# Patient Record
Sex: MALE | Race: WHITE | Employment: OTHER | URBAN - METROPOLITAN AREA
[De-identification: names, ages, dates, MRNs, and addresses within clinical notes are randomized per-mention and may not be internally consistent; named-entity substitution may affect disease eponyms.]

---

## 2018-04-13 ENCOUNTER — TELEPHONE (OUTPATIENT)
Dept: ORTHOPEDIC SURGERY | Age: 83
End: 2018-04-13

## 2018-04-23 ENCOUNTER — OFFICE VISIT (OUTPATIENT)
Dept: ORTHOPEDIC SURGERY | Age: 83
End: 2018-04-23

## 2018-04-23 VITALS — BODY MASS INDEX: 31.83 KG/M2 | WEIGHT: 210 LBS | HEIGHT: 68 IN

## 2018-04-23 DIAGNOSIS — M25.532 LEFT WRIST PAIN: Primary | ICD-10-CM

## 2018-04-23 DIAGNOSIS — M65.342 TRIGGER RING FINGER OF LEFT HAND: ICD-10-CM

## 2018-04-23 PROCEDURE — 99203 OFFICE O/P NEW LOW 30 MIN: CPT | Performed by: ORTHOPAEDIC SURGERY

## 2018-04-23 PROCEDURE — G8427 DOCREV CUR MEDS BY ELIG CLIN: HCPCS | Performed by: ORTHOPAEDIC SURGERY

## 2018-04-23 PROCEDURE — 1036F TOBACCO NON-USER: CPT | Performed by: ORTHOPAEDIC SURGERY

## 2018-04-23 PROCEDURE — G8417 CALC BMI ABV UP PARAM F/U: HCPCS | Performed by: ORTHOPAEDIC SURGERY

## 2018-04-23 PROCEDURE — 20550 NJX 1 TENDON SHEATH/LIGAMENT: CPT | Performed by: ORTHOPAEDIC SURGERY

## 2018-04-23 PROCEDURE — 1123F ACP DISCUSS/DSCN MKR DOCD: CPT | Performed by: ORTHOPAEDIC SURGERY

## 2018-04-23 PROCEDURE — 4040F PNEUMOC VAC/ADMIN/RCVD: CPT | Performed by: ORTHOPAEDIC SURGERY

## 2018-04-23 RX ORDER — AMLODIPINE BESYLATE 5 MG/1
TABLET ORAL
Refills: 3 | Status: ON HOLD | COMMUNITY
Start: 2018-03-03 | End: 2022-04-27 | Stop reason: HOSPADM

## 2018-04-23 RX ORDER — ATORVASTATIN CALCIUM 20 MG/1
TABLET, FILM COATED ORAL
Refills: 0 | Status: ON HOLD | COMMUNITY
Start: 2018-03-03 | End: 2022-04-26 | Stop reason: HOSPADM

## 2020-07-14 ENCOUNTER — OFFICE VISIT (OUTPATIENT)
Dept: PRIMARY CARE CLINIC | Age: 85
End: 2020-07-14
Payer: MEDICARE

## 2020-07-14 PROCEDURE — G8421 BMI NOT CALCULATED: HCPCS | Performed by: NURSE PRACTITIONER

## 2020-07-14 PROCEDURE — G8428 CUR MEDS NOT DOCUMENT: HCPCS | Performed by: NURSE PRACTITIONER

## 2020-07-14 PROCEDURE — 99211 OFF/OP EST MAY X REQ PHY/QHP: CPT | Performed by: NURSE PRACTITIONER

## 2020-07-14 NOTE — PROGRESS NOTES
Lugenia Flatness received a viral test for COVID-19. They were educated on isolation and quarantine as appropriate. For any symptoms, they were directed to seek care from their PCP, given contact information to establish with a doctor, directed to an urgent care or the emergency room.

## 2020-07-15 ENCOUNTER — TELEPHONE (OUTPATIENT)
Dept: ADMINISTRATIVE | Age: 85
End: 2020-07-15

## 2020-07-19 LAB
SARS-COV-2: NOT DETECTED
SOURCE: NORMAL

## 2022-04-20 ENCOUNTER — APPOINTMENT (OUTPATIENT)
Dept: CT IMAGING | Age: 87
DRG: 177 | End: 2022-04-20
Payer: MEDICARE

## 2022-04-20 ENCOUNTER — APPOINTMENT (OUTPATIENT)
Dept: GENERAL RADIOLOGY | Age: 87
DRG: 177 | End: 2022-04-20
Payer: MEDICARE

## 2022-04-20 ENCOUNTER — HOSPITAL ENCOUNTER (INPATIENT)
Age: 87
LOS: 6 days | Discharge: NURSING, REHAB OR LONG-TERM CARE | DRG: 177 | End: 2022-04-27
Attending: INTERNAL MEDICINE | Admitting: INTERNAL MEDICINE
Payer: MEDICARE

## 2022-04-20 DIAGNOSIS — R09.02 HYPOXIA: Primary | ICD-10-CM

## 2022-04-20 DIAGNOSIS — J18.9 MULTIFOCAL PNEUMONIA: ICD-10-CM

## 2022-04-20 DIAGNOSIS — J18.9 PNEUMONIA DUE TO INFECTIOUS ORGANISM, UNSPECIFIED LATERALITY, UNSPECIFIED PART OF LUNG: ICD-10-CM

## 2022-04-20 LAB
A/G RATIO: 1.4 (ref 1.1–2.2)
ALBUMIN SERPL-MCNC: 4 G/DL (ref 3.4–5)
ALP BLD-CCNC: 71 U/L (ref 40–129)
ALT SERPL-CCNC: 15 U/L (ref 10–40)
ANION GAP SERPL CALCULATED.3IONS-SCNC: 12 MMOL/L (ref 3–16)
AST SERPL-CCNC: 24 U/L (ref 15–37)
BASOPHILS ABSOLUTE: 0 K/UL (ref 0–0.2)
BASOPHILS RELATIVE PERCENT: 0.3 %
BILIRUB SERPL-MCNC: 0.5 MG/DL (ref 0–1)
BUN BLDV-MCNC: 16 MG/DL (ref 7–20)
CALCIUM SERPL-MCNC: 9.4 MG/DL (ref 8.3–10.6)
CHLORIDE BLD-SCNC: 101 MMOL/L (ref 99–110)
CO2: 28 MMOL/L (ref 21–32)
CREAT SERPL-MCNC: 1.1 MG/DL (ref 0.8–1.3)
EOSINOPHILS ABSOLUTE: 0.1 K/UL (ref 0–0.6)
EOSINOPHILS RELATIVE PERCENT: 0.5 %
GFR AFRICAN AMERICAN: >60
GFR NON-AFRICAN AMERICAN: >60
GLUCOSE BLD-MCNC: 177 MG/DL (ref 70–99)
HCT VFR BLD CALC: 42 % (ref 40.5–52.5)
HEMOGLOBIN: 13.2 G/DL (ref 13.5–17.5)
LYMPHOCYTES ABSOLUTE: 1.3 K/UL (ref 1–5.1)
LYMPHOCYTES RELATIVE PERCENT: 11.4 %
MCH RBC QN AUTO: 28.5 PG (ref 26–34)
MCHC RBC AUTO-ENTMCNC: 31.5 G/DL (ref 31–36)
MCV RBC AUTO: 90.5 FL (ref 80–100)
MONOCYTES ABSOLUTE: 0.4 K/UL (ref 0–1.3)
MONOCYTES RELATIVE PERCENT: 4 %
NEUTROPHILS ABSOLUTE: 9.4 K/UL (ref 1.7–7.7)
NEUTROPHILS RELATIVE PERCENT: 83.8 %
PDW BLD-RTO: 17.3 % (ref 12.4–15.4)
PLATELET # BLD: 169 K/UL (ref 135–450)
PMV BLD AUTO: 8.1 FL (ref 5–10.5)
POTASSIUM REFLEX MAGNESIUM: 4.3 MMOL/L (ref 3.5–5.1)
PRO-BNP: 413 PG/ML (ref 0–449)
PROCALCITONIN: 0.31 NG/ML (ref 0–0.15)
RBC # BLD: 4.64 M/UL (ref 4.2–5.9)
SODIUM BLD-SCNC: 141 MMOL/L (ref 136–145)
TOTAL PROTEIN: 6.8 G/DL (ref 6.4–8.2)
TROPONIN: <0.01 NG/ML
WBC # BLD: 11.2 K/UL (ref 4–11)

## 2022-04-20 PROCEDURE — 6360000004 HC RX CONTRAST MEDICATION: Performed by: PHYSICIAN ASSISTANT

## 2022-04-20 PROCEDURE — 36415 COLL VENOUS BLD VENIPUNCTURE: CPT

## 2022-04-20 PROCEDURE — 84484 ASSAY OF TROPONIN QUANT: CPT

## 2022-04-20 PROCEDURE — 93005 ELECTROCARDIOGRAM TRACING: CPT | Performed by: EMERGENCY MEDICINE

## 2022-04-20 PROCEDURE — 83880 ASSAY OF NATRIURETIC PEPTIDE: CPT

## 2022-04-20 PROCEDURE — 80053 COMPREHEN METABOLIC PANEL: CPT

## 2022-04-20 PROCEDURE — 71260 CT THORAX DX C+: CPT

## 2022-04-20 PROCEDURE — 96365 THER/PROPH/DIAG IV INF INIT: CPT

## 2022-04-20 PROCEDURE — 71045 X-RAY EXAM CHEST 1 VIEW: CPT

## 2022-04-20 PROCEDURE — 84145 PROCALCITONIN (PCT): CPT

## 2022-04-20 PROCEDURE — 96375 TX/PRO/DX INJ NEW DRUG ADDON: CPT

## 2022-04-20 PROCEDURE — 99285 EMERGENCY DEPT VISIT HI MDM: CPT

## 2022-04-20 PROCEDURE — 93005 ELECTROCARDIOGRAM TRACING: CPT | Performed by: INTERNAL MEDICINE

## 2022-04-20 PROCEDURE — 85025 COMPLETE CBC W/AUTO DIFF WBC: CPT

## 2022-04-20 PROCEDURE — 83036 HEMOGLOBIN GLYCOSYLATED A1C: CPT

## 2022-04-20 RX ORDER — LEVOFLOXACIN 5 MG/ML
750 INJECTION, SOLUTION INTRAVENOUS ONCE
Status: COMPLETED | OUTPATIENT
Start: 2022-04-20 | End: 2022-04-21

## 2022-04-20 RX ORDER — PANTOPRAZOLE SODIUM 20 MG/1
20 TABLET, DELAYED RELEASE ORAL 2 TIMES DAILY
COMMUNITY

## 2022-04-20 RX ORDER — GABAPENTIN 100 MG/1
300 CAPSULE ORAL 4 TIMES DAILY
COMMUNITY

## 2022-04-20 RX ORDER — MULTIVIT WITH MINERALS/LUTEIN
250 TABLET ORAL DAILY
Status: ON HOLD | COMMUNITY
End: 2022-04-26 | Stop reason: HOSPADM

## 2022-04-20 RX ORDER — THIAMINE MONONITRATE (VIT B1) 100 MG
100 TABLET ORAL DAILY
COMMUNITY

## 2022-04-20 RX ADMIN — IOPAMIDOL 75 ML: 755 INJECTION, SOLUTION INTRAVENOUS at 22:06

## 2022-04-21 PROBLEM — E11.9 TYPE 2 DIABETES MELLITUS (HCC): Status: ACTIVE | Noted: 2022-04-21

## 2022-04-21 PROBLEM — J18.9 MULTIFOCAL PNEUMONIA: Status: ACTIVE | Noted: 2022-04-21

## 2022-04-21 PROBLEM — J96.01 ACUTE RESPIRATORY FAILURE WITH HYPOXIA (HCC): Status: ACTIVE | Noted: 2022-04-21

## 2022-04-21 LAB
ESTIMATED AVERAGE GLUCOSE: 139.9 MG/DL
ESTIMATED AVERAGE GLUCOSE: 145.6 MG/DL
GLUCOSE BLD-MCNC: 105 MG/DL (ref 70–99)
GLUCOSE BLD-MCNC: 113 MG/DL (ref 70–99)
GLUCOSE BLD-MCNC: 147 MG/DL (ref 70–99)
GLUCOSE BLD-MCNC: 155 MG/DL (ref 70–99)
GLUCOSE BLD-MCNC: 222 MG/DL (ref 70–99)
HBA1C MFR BLD: 6.5 %
HBA1C MFR BLD: 6.7 %
INFLUENZA A: NOT DETECTED
INFLUENZA B: NOT DETECTED
LACTIC ACID, SEPSIS: 0.8 MMOL/L (ref 0.4–1.9)
PERFORMED ON: ABNORMAL
SARS-COV-2 RNA, RT PCR: NOT DETECTED

## 2022-04-21 PROCEDURE — 92610 EVALUATE SWALLOWING FUNCTION: CPT

## 2022-04-21 PROCEDURE — 36415 COLL VENOUS BLD VENIPUNCTURE: CPT

## 2022-04-21 PROCEDURE — 6370000000 HC RX 637 (ALT 250 FOR IP): Performed by: INTERNAL MEDICINE

## 2022-04-21 PROCEDURE — 92526 ORAL FUNCTION THERAPY: CPT

## 2022-04-21 PROCEDURE — 83036 HEMOGLOBIN GLYCOSYLATED A1C: CPT

## 2022-04-21 PROCEDURE — 6360000002 HC RX W HCPCS: Performed by: INTERNAL MEDICINE

## 2022-04-21 PROCEDURE — 87636 SARSCOV2 & INF A&B AMP PRB: CPT

## 2022-04-21 PROCEDURE — 1200000000 HC SEMI PRIVATE

## 2022-04-21 PROCEDURE — 2580000003 HC RX 258: Performed by: INTERNAL MEDICINE

## 2022-04-21 PROCEDURE — 6360000002 HC RX W HCPCS: Performed by: PHYSICIAN ASSISTANT

## 2022-04-21 PROCEDURE — 87040 BLOOD CULTURE FOR BACTERIA: CPT

## 2022-04-21 PROCEDURE — 94640 AIRWAY INHALATION TREATMENT: CPT

## 2022-04-21 PROCEDURE — 83605 ASSAY OF LACTIC ACID: CPT

## 2022-04-21 PROCEDURE — 94150 VITAL CAPACITY TEST: CPT

## 2022-04-21 PROCEDURE — 2700000000 HC OXYGEN THERAPY PER DAY

## 2022-04-21 RX ORDER — ANASTROZOLE 1 MG/1
1 TABLET ORAL DAILY
Status: DISCONTINUED | OUTPATIENT
Start: 2022-04-21 | End: 2022-04-21 | Stop reason: ALTCHOICE

## 2022-04-21 RX ORDER — INSULIN LISPRO 100 [IU]/ML
0-12 INJECTION, SOLUTION INTRAVENOUS; SUBCUTANEOUS
Status: DISCONTINUED | OUTPATIENT
Start: 2022-04-21 | End: 2022-04-27 | Stop reason: HOSPADM

## 2022-04-21 RX ORDER — SODIUM CHLORIDE 9 MG/ML
INJECTION, SOLUTION INTRAVENOUS PRN
Status: DISCONTINUED | OUTPATIENT
Start: 2022-04-21 | End: 2022-04-27 | Stop reason: HOSPADM

## 2022-04-21 RX ORDER — ACETAMINOPHEN 325 MG/1
650 TABLET ORAL EVERY 6 HOURS PRN
Status: DISCONTINUED | OUTPATIENT
Start: 2022-04-21 | End: 2022-04-27 | Stop reason: HOSPADM

## 2022-04-21 RX ORDER — DEXTROSE MONOHYDRATE 50 MG/ML
100 INJECTION, SOLUTION INTRAVENOUS PRN
Status: DISCONTINUED | OUTPATIENT
Start: 2022-04-21 | End: 2022-04-27 | Stop reason: HOSPADM

## 2022-04-21 RX ORDER — NICOTINE POLACRILEX 4 MG
15 LOZENGE BUCCAL PRN
Status: DISCONTINUED | OUTPATIENT
Start: 2022-04-21 | End: 2022-04-27 | Stop reason: HOSPADM

## 2022-04-21 RX ORDER — DEXTROSE MONOHYDRATE 25 G/50ML
12.5 INJECTION, SOLUTION INTRAVENOUS PRN
Status: DISCONTINUED | OUTPATIENT
Start: 2022-04-21 | End: 2022-04-27 | Stop reason: HOSPADM

## 2022-04-21 RX ORDER — AZITHROMYCIN 250 MG/1
500 TABLET, FILM COATED ORAL EVERY 24 HOURS
Status: COMPLETED | OUTPATIENT
Start: 2022-04-21 | End: 2022-04-23

## 2022-04-21 RX ORDER — ONDANSETRON 4 MG/1
4 TABLET, ORALLY DISINTEGRATING ORAL EVERY 8 HOURS PRN
Status: DISCONTINUED | OUTPATIENT
Start: 2022-04-21 | End: 2022-04-27 | Stop reason: HOSPADM

## 2022-04-21 RX ORDER — ALBUTEROL SULFATE 2.5 MG/3ML
2.5 SOLUTION RESPIRATORY (INHALATION) EVERY 4 HOURS PRN
Status: DISCONTINUED | OUTPATIENT
Start: 2022-04-21 | End: 2022-04-27 | Stop reason: HOSPADM

## 2022-04-21 RX ORDER — AMLODIPINE BESYLATE AND ATORVASTATIN CALCIUM 5; 20 MG/1; MG/1
1 TABLET, FILM COATED ORAL DAILY
Status: DISCONTINUED | OUTPATIENT
Start: 2022-04-21 | End: 2022-04-21

## 2022-04-21 RX ORDER — INSULIN LISPRO 100 [IU]/ML
0-6 INJECTION, SOLUTION INTRAVENOUS; SUBCUTANEOUS NIGHTLY
Status: DISCONTINUED | OUTPATIENT
Start: 2022-04-21 | End: 2022-04-27 | Stop reason: HOSPADM

## 2022-04-21 RX ORDER — SODIUM CHLORIDE 0.9 % (FLUSH) 0.9 %
5-40 SYRINGE (ML) INJECTION PRN
Status: DISCONTINUED | OUTPATIENT
Start: 2022-04-21 | End: 2022-04-27 | Stop reason: HOSPADM

## 2022-04-21 RX ORDER — POLYETHYLENE GLYCOL 3350 17 G/17G
17 POWDER, FOR SOLUTION ORAL DAILY PRN
Status: DISCONTINUED | OUTPATIENT
Start: 2022-04-21 | End: 2022-04-27 | Stop reason: HOSPADM

## 2022-04-21 RX ORDER — SODIUM CHLORIDE 0.9 % (FLUSH) 0.9 %
5-40 SYRINGE (ML) INJECTION EVERY 12 HOURS SCHEDULED
Status: DISCONTINUED | OUTPATIENT
Start: 2022-04-21 | End: 2022-04-27 | Stop reason: HOSPADM

## 2022-04-21 RX ORDER — ACETAMINOPHEN 650 MG/1
650 SUPPOSITORY RECTAL EVERY 6 HOURS PRN
Status: DISCONTINUED | OUTPATIENT
Start: 2022-04-21 | End: 2022-04-27 | Stop reason: HOSPADM

## 2022-04-21 RX ORDER — GABAPENTIN 300 MG/1
600 CAPSULE ORAL 4 TIMES DAILY
Status: DISCONTINUED | OUTPATIENT
Start: 2022-04-21 | End: 2022-04-23

## 2022-04-21 RX ORDER — AMLODIPINE BESYLATE 5 MG/1
5 TABLET ORAL DAILY
Status: DISCONTINUED | OUTPATIENT
Start: 2022-04-21 | End: 2022-04-27

## 2022-04-21 RX ORDER — PREGABALIN 100 MG/1
100 CAPSULE ORAL DAILY
Status: DISCONTINUED | OUTPATIENT
Start: 2022-04-21 | End: 2022-04-21 | Stop reason: ALTCHOICE

## 2022-04-21 RX ORDER — ENOXAPARIN SODIUM 100 MG/ML
40 INJECTION SUBCUTANEOUS DAILY
Status: DISCONTINUED | OUTPATIENT
Start: 2022-04-21 | End: 2022-04-27 | Stop reason: HOSPADM

## 2022-04-21 RX ORDER — ALBUTEROL SULFATE 2.5 MG/3ML
2.5 SOLUTION RESPIRATORY (INHALATION) 2 TIMES DAILY
Status: DISCONTINUED | OUTPATIENT
Start: 2022-04-21 | End: 2022-04-24

## 2022-04-21 RX ORDER — ONDANSETRON 2 MG/ML
4 INJECTION INTRAMUSCULAR; INTRAVENOUS EVERY 6 HOURS PRN
Status: DISCONTINUED | OUTPATIENT
Start: 2022-04-21 | End: 2022-04-27 | Stop reason: HOSPADM

## 2022-04-21 RX ORDER — PANTOPRAZOLE SODIUM 40 MG/1
40 TABLET, DELAYED RELEASE ORAL
Status: DISCONTINUED | OUTPATIENT
Start: 2022-04-21 | End: 2022-04-27 | Stop reason: HOSPADM

## 2022-04-21 RX ORDER — DOXAZOSIN MESYLATE 4 MG/1
4 TABLET ORAL DAILY
Status: DISCONTINUED | OUTPATIENT
Start: 2022-04-21 | End: 2022-04-27 | Stop reason: HOSPADM

## 2022-04-21 RX ADMIN — ENOXAPARIN SODIUM 40 MG: 100 INJECTION SUBCUTANEOUS at 08:24

## 2022-04-21 RX ADMIN — Medication 10 ML: at 23:38

## 2022-04-21 RX ADMIN — GABAPENTIN 600 MG: 300 CAPSULE ORAL at 13:02

## 2022-04-21 RX ADMIN — INSULIN LISPRO 4 UNITS: 100 INJECTION, SOLUTION INTRAVENOUS; SUBCUTANEOUS at 18:09

## 2022-04-21 RX ADMIN — INSULIN LISPRO 2 UNITS: 100 INJECTION, SOLUTION INTRAVENOUS; SUBCUTANEOUS at 09:00

## 2022-04-21 RX ADMIN — GABAPENTIN 600 MG: 300 CAPSULE ORAL at 23:37

## 2022-04-21 RX ADMIN — LEVOFLOXACIN 750 MG: 5 INJECTION, SOLUTION INTRAVENOUS at 00:14

## 2022-04-21 RX ADMIN — PANTOPRAZOLE SODIUM 40 MG: 40 TABLET, DELAYED RELEASE ORAL at 17:37

## 2022-04-21 RX ADMIN — Medication 1000 MG: at 00:13

## 2022-04-21 RX ADMIN — INSULIN LISPRO 1 UNITS: 100 INJECTION, SOLUTION INTRAVENOUS; SUBCUTANEOUS at 23:40

## 2022-04-21 RX ADMIN — Medication 10 ML: at 08:25

## 2022-04-21 RX ADMIN — AMLODIPINE BESYLATE 5 MG: 5 TABLET ORAL at 08:24

## 2022-04-21 RX ADMIN — ALBUTEROL SULFATE 2.5 MG: 2.5 SOLUTION RESPIRATORY (INHALATION) at 20:00

## 2022-04-21 RX ADMIN — Medication 1000 MG: at 23:38

## 2022-04-21 RX ADMIN — GABAPENTIN 600 MG: 300 CAPSULE ORAL at 08:24

## 2022-04-21 RX ADMIN — ONDANSETRON 4 MG: 2 INJECTION INTRAMUSCULAR; INTRAVENOUS at 13:11

## 2022-04-21 RX ADMIN — GABAPENTIN 600 MG: 300 CAPSULE ORAL at 17:37

## 2022-04-21 RX ADMIN — DOXAZOSIN 4 MG: 4 TABLET ORAL at 08:25

## 2022-04-21 RX ADMIN — PANTOPRAZOLE SODIUM 40 MG: 40 TABLET, DELAYED RELEASE ORAL at 06:27

## 2022-04-21 RX ADMIN — ALBUTEROL SULFATE 2.5 MG: 2.5 SOLUTION RESPIRATORY (INHALATION) at 09:05

## 2022-04-21 RX ADMIN — AZITHROMYCIN MONOHYDRATE 500 MG: 250 TABLET ORAL at 04:30

## 2022-04-21 ASSESSMENT — PAIN SCALES - GENERAL: PAINLEVEL_OUTOF10: 0

## 2022-04-21 NOTE — ED NOTES
Blood cultures drawn and sent to lab:    [facility] 1: RAC, ST.  BC 2: [address] Lluvia Eckert RN  04/21/22 7942

## 2022-04-21 NOTE — PROGRESS NOTES
Shift assessment and vitals obtained by Gerard Paredes. Scheduled medications given. Pt assisted up to chair, currently watching TV. No needs expressed. Call light in reach. Fall precautions in place.

## 2022-04-21 NOTE — FLOWSHEET NOTE
04/21/22 0154   Assessment   Charting Type Admission   Psychosocial   Psychosocial (WDL) WDL   Neurological   Neuro (WDL) WDL   Level of Consciousness Alert (0)   [place] Coma Scale   Eye Opening 4   Best Verbal Response 5   Best Motor Response 6   [place] Coma Scale Score 15   HEENT (Head, Ears, Eyes, Nose, & Throat)   HEENT (WDL) WDL   Respiratory   Respiratory (WDL) WDL   Respiratory Pattern Tachypneic   Respiratory Depth Normal   Respiratory Quality/Effort Unlabored;Dyspnea with exertion   Chest Assessment Chest expansion symmetrical   L Breath Sounds Expiratory Wheezes   R Breath Sounds Expiratory Wheezes   Breath Sounds   Right Upper Lobe Expiratory Wheezes   Right Middle Lobe Expiratory Wheezes   Right Lower Lobe Expiratory Wheezes   Left Upper Lobe Expiratory Wheezes   Left Lower Lobe Expiratory Wheezes   Cardiac   Cardiac (WDL) WDL   Cardiac Monitor   Cardiac/Telemetry Monitor On Portable telemetry pack applied   Alarm Audible Yes   Gastrointestinal   Abdominal (WDL) WDL   Abdomen Inspection Distended   Genitourinary   Genitourinary (WDL) WDL   Suprapubic Tenderness No   Dysuria (Pain/Burning w/Urination) No   Difficulty Urinating/Starting Stream No   Peripheral Vascular   Peripheral Vascular (WDL) WDL   Edema None   Dual Clinician Skin Assessment   Dual Skin Assessment (4 Eyes) WDL   Skin Integumentary    Skin Integumentary (WDL) X   Skin Color Pink   Skin Condition/Temp Warm   Skin Integrity Abrasion   Location left knee and shin    Nails WDL   Musculoskeletal   Musculoskeletal (WDL) X   RL Extremity Weakness   LL Extremity Weakness

## 2022-04-21 NOTE — ED NOTES
Son at bedside stated patient recently started taking antidepressant and PCP thinks this is the cause of his tremors. States stopped taking antidepressant at lunch time today.       Narinder Lopez RN  04/20/22 4890

## 2022-04-21 NOTE — CARE COORDINATION
Discharge Planning Assessment  Readmission Risk: 10    RN/SW discharge planner met with patient/ (and family member) to discuss reason for admission, current living situation, and potential needs at the time of discharge. Demographics/Insurance verified (address, phone, insurance):   Yes    Current type of dwelling:  Ranch style home    Patient from ECF/ confirmed with:   n/a    Living arrangements: With spouse    Level of function/Support:  Independent    PCP:   Dr. Loreta Wagner   Last Visit to PCP:   04/13/2022    DME (physical):  None    DME (medical):  Pt recently started on home O2 at . Pt unable to name provider,  CM able to call PCP to determine O2 provider and called provider to confirm dose of 2L/NC continuous. [address] [place], [address]  P: [phone]  F: [phone]      Medication compliance issues:  None    Financial issues that could impact healthcare:  None    Transportation at the time of discharge:  One of his adult children    Tentative discharge plan:   Home with updated home O2 orders    Discussed with patient and/or family that on the day of discharge home tentative time of discharge will be between 10 AM and noon. Electronically signed by Oneal Ackerman RN on 4/21/2022 at 10:58 AM     UPDATE    CM spoke to Greg Wheat at [address] ([phone]) to verify pt is active and receives MoW.      Electronically signed by Oneal Ackerman RN on 4/21/2022 at 2:33 PM

## 2022-04-21 NOTE — PROGRESS NOTES
Facility/Department: [address] ONCOLOGY  Initial Assessment  DYSPHAGIA BEDSIDE SWALLOW EVALUATION     Patient: Rosalva Watkins   : 1925   MRN: [id number]      Evaluation Date: 2022   Admitting Diagnosis: Hypoxia [R09.02]  Pneumonia due to infectious organism, unspecified laterality, unspecified part of lung [J18.9]  Multifocal pneumonia [J18.9]  Pain: Pt did not report pain                       H&P: The patient is a 80 y.o. male with history of type 2 diabetes with neuropathy, hypertension, GERD, hyperlipidemia, BPH who presented with generalized malaise weakness, chills, tremors, and noted to have hypoxia with saturations of 86% room air. Chest x-ray done was noted for multifocal infiltrates with CT pulmonary angiogram was done with negative for PE but noted for multifocal pneumonia. He denies any fevers, no diaphoresis. No chest pains. No shortness of breath. No cough or production. Procalcitonin at 0.31  EKG sinus rhythm    Imaging:  CT Chest:    1. Limited exam, though no pulmonary emboli seen through the lobar branches. 2. Patchy consolidative airspace disease seen in the lingula and bilateral   lower lobes, left greater than right felt to represent multifocal pneumonia. No reactive effusion. 3. Cardiomegaly, with coronary artery atherosclerotic disease and minimal   aortic valvular calcification. 4. Mildly enlarged precarinal lymph node felt likely reactive.  No bulky   lymphadenopathy in the chest.   5. Multilevel degenerative changes are seen in the spine, with diffuse   osteopenia.  No acute fracture. Modified Barium Swallow Study: 2019  Patient presents with mild-moderate pharyngeal phase dysphagia characterized by laryngeal penetration of thin liquids in single sips and silent tracheal aspiration of thin liquids when taking multiple consecutive swallows. A chin tuck was successful in eliminating aspiration, and resulted in inconsistent trace penetration.  Epiglottic inversion and laryngeal elevation were adequate. Minimal vallecular residuals were noted after the swallow. Recommend a regular diet and thin liquids WITH USE OF SINGLE SIPS AND A CHIN TUCK to reduce the risk of aspiration. Silent aspiration could account for patient's recent h/o pneumonia. Regular texture diet with thin liquids (with chin tuck) was recommended following MBS. History/Prior Level of Function:   Living Status: Private residence   Prior Dysphagia History: Pt's son reported a history of aspiration with pneumonia. Pt has been hospitalized ~2x in the past 5 years, however his son reported he has been on antibiotics multiple times for pneumonia. Pt's son stated he does cough during meals and had an episode during lunch this date. He consumes regular foods with thin liquids at home. Reason for referral: SLP evaluation orders received due to coughing with intake     Dysphagia Impressions/Diagnosis: Mild-Moderate Oropharyngeal Dysphagia   Pt was seen sitting upright in chair on 5L O2 via nasal cannula. Pt has O2 at home, however does not wear consistently. Oral motor exam was within functional limits with strong volitional cough noted. Various textures were provided to assess swallow function. Pt presents with mild-moderate oropharyngeal dysphagia characterized by prolonged mastication, suspected premature bolus loss to pharynx, delayed swallow initiation, reduced laryngeal elevation upon manual palpation, suspected reduced pharyngeal clearing, and intermittent delayed coughing post liquid trials. Thin liquids via cup revealed suspected premature bolus loss to pharynx with delayed swallow initiation, intermittent use of two swallows noted with occasional delayed coughing. Given cues to use chin tuck strategy, reduced coughing was noted, however difficult to fully assess at bedside.  Prolonged mastication with mildly reduced A-P propulsion noted with regular solids, instance of coughing noted during mastication. Improved tolerance assessed with soft solid trials. Overall, Pt is at high risk of aspiration based on history of dysphagia and current respiratory compromise. Recommend Modified Barium Swallow Study to further assess the pharyngeal phase of swallowing and to assess for aspiration. Recommended Diet and Intervention 4/21/2022:  Diet Solids Recommendation:  Dysphagia III Soft and bite sized  Liquid Consistency Recommendation: Thin liquids with use of chin tuck, no straws Recommended form of Meds: Meds in puree      Recommend completion of Modified Barium Swallow study to further assess pharyngeal phase of swallow     Compensatory Swallowing Strategies: Alternate solids/liquids , Upright as possible with all PO intake , Chin tuck , Small bites/sips , Eat/feed slowly, Remain upright 30-45 min     SHORT TERM DYSPHAGIA GOALS/PLAN OF CARE: Speech therapy for dysphagia tx 3-5 times per week during acute care stay.     Pt will functionally tolerate recommended diet with no overt clinical s/s of aspiration   Pt will demonstrate understanding of aspiration risk and precautions via education/demonstration with occasional prompting  Pt will advance to least restrictive diet as indicated   Pt will participate in Modified Barium swallow study to further assess pharyngeal phase of swallow, assist with diet recommendations and to further direct plan of care     Dysphagia Therapeutic Intervention:  Diet Tolerance Monitoring , Patient/Family Education , Therapeutic Trials with SLP , Instrumental assessment of swallow function (Modified Barium Swallow Study)     Discharge Recommendations: Recommend ongoing SLP for dysphagia therapy upon discharge from hospital     Patient Positioning: Upright in chair    Current Diet Level (prior to evaluation): Regular texture diet  Thin liquids      Respiratory Status:   []Room Air   [x]O2 via nasal cannula   []Other:    Dentition:  [x]Adequate  []Dentures   []Missing Many Teeth  []Edentulous  []Other:    Baseline Vocal Quality:  []Normal  []Dysphonic   []Aphonic   []Hoarse  [x]Wet  []Weak  [x]Other:harsh    Volitional Cough:  [x]Strong  []Weak  []Wet  []Absent  [x]Congested  []Other:    Volitional Swallow:   []Absent   [x]Delayed     []Adequate     []Required use of drink     Oral Mechanism Exam:  [x]WFL []Mild   [] Moderate  []Severe  []To be assessed  Impaired:   []Left side      []Right side    []Labial ROM/Coordination    []Labial Symmetry   []Lingual ROM/Coordination   []Lingual Symmetry  []Gag  []Other:     Oral Phase: []WFL [x]Mild   [x] Moderate  []Severe  []To be assessed   [x]Impaired/Prolonged Mastication:   []Spillage Left:   []Spillage Right:  []Pocketing Left:   []Pocketing Right:   [x]Decreased Anterior to Posterior Transit:   [x]Suspected Premature Bolus Loss:   []Lingual/Palatal Residue:   []Other:     Pharyngeal Phase: []WFL [x]Mild   [x] Moderate  []Severe  []To be assessed   [x]Delayed Swallow:   [x]Suspected Pharyngeal Pooling:   [x]Decreased Laryngeal Elevation:   []Absent Swallow:  []Wet Vocal Quality:   []Throat Clearing-Immediate:   []Throat Clearing-Delayed:   []Cough-Immediate:   [x]Cough-Delayed: intermittent with thin liquids   []Change in Vital Signs:  []Suspected Delayed Pharyngeal Clearing:  []Other:     Eating Assistance:  []Independent  [x]Setup or clean-up assistance   [] Supervision or touching assistance   [] Partial or moderate assistance   [] Substantial or maximal assistance  [] Dependent       EDUCATION:   Provided education regarding role of SLP, results of assessment, recommendations and general speech pathology plan of care. [x] Pt verbalized understanding and agreement   [] Pt requires ongoing learning   [] No evidence of comprehension     If patient discharges prior to next visit, this note will serve as discharge.      Timed Code Minutes:  0 minutes  Total Treatment Minutes: 30 minutes     Electronically signed by:   Carlos Mariee M.A., [facility]  Speech-Language Pathologist

## 2022-04-21 NOTE — RT PROTOCOL NOTE
RT Inhaler-Nebulizer Bronchodilator Protocol Note    There is a bronchodilator order in the chart from a provider indicating to follow the RT Bronchodilator Protocol and there is an Initiate RT Inhaler-Nebulizer Bronchodilator Protocol order as well (see protocol at bottom of note). CXR Findings:  XR CHEST PORTABLE    Result Date: 4/20/2022  Mild cardiomegaly and mild central pulmonary congestion with hazy left perihilar opacity extending inferiorly which could be due to atypical pulmonary edema and/or early pneumonia. The findings from the last RT Protocol Assessment were as follows:   History Pulmonary Disease: None or smoker <15 pack years  Respiratory Pattern: Regular pattern and RR 12-20 bpm  Breath Sounds: Intermittent or unilateral wheezes  Cough: Strong, spontaneous, non-productive  Indication for Bronchodilator Therapy:    Bronchodilator Assessment Score: 4    Aerosolized bronchodilator medication orders have been revised according to the RT Inhaler-Nebulizer Bronchodilator Protocol below. Respiratory Therapist to perform RT Therapy Protocol Assessment initially then follow the protocol. Repeat RT Therapy Protocol Assessment PRN for score 0-3 or on second treatment, BID, and PRN for scores above 3. No Indications - adjust the frequency to every 6 hours PRN wheezing or bronchospasm, if no treatments needed after 48 hours then discontinue using Per Protocol order mode. If indication present, adjust the RT bronchodilator orders based on the Bronchodilator Assessment Score as indicated below. Use Inhaler orders unless patient has one or more of the following: on home nebulizer, not able to hold breath for 10 seconds, is not alert and oriented, cannot activate and use MDI correctly, or respiratory rate 25 breaths per minute or more, then use the equivalent nebulizer order(s) with same Frequency and PRN reasons based on the score.   If a patient is on this medication at home then do not decrease Frequency below that used at home. 0-3 - enter or revise RT bronchodilator order(s) to equivalent RT Bronchodilator order with Frequency of every 4 hours PRN for wheezing or increased work of breathing using Per Protocol order mode. 4-6 - enter or revise RT Bronchodilator order(s) to two equivalent RT bronchodilator orders with one order with BID Frequency and one order with Frequency of every 4 hours PRN wheezing or increased work of breathing using Per Protocol order mode. 7-10 - enter or revise RT Bronchodilator order(s) to two equivalent RT bronchodilator orders with one order with TID Frequency and one order with Frequency of every 4 hours PRN wheezing or increased work of breathing using Per Protocol order mode. 11-13 - enter or revise RT Bronchodilator order(s) to one equivalent RT bronchodilator order with QID Frequency and an Albuterol order with Frequency of every 4 hours PRN wheezing or increased work of breathing using Per Protocol order mode. Greater than 13 - enter or revise RT Bronchodilator order(s) to one equivalent RT bronchodilator order with every 4 hours Frequency and an Albuterol order with Frequency of every 2 hours PRN wheezing or increased work of breathing using Per Protocol order mode. RT to enter RT Home Evaluation for COPD & MDI Assessment order using Per Protocol order mode.     Electronically signed by Quiana Guo RCP on 4/21/2022 at 3:52 AM

## 2022-04-21 NOTE — ED PROVIDER NOTES
[facility]        Pt Name: Rachele Martinez  MRN: [id number]  [place] 12/22/1925  Date of evaluation: 4/20/2022  Provider: Pily Palacios PA-C  PCP: Sanket Espino MD  Note Started: 8:55 PM EDT       EVELYN. I have evaluated this patient. My supervising physician was available for consultation. I personally saw the patient and independently provided 33 minutes of non-concurrent critical care time out of the total critical care time provided. This excludes time spent doing separately billable procedures. This includes time at the bedside, data interpretation, medication management, obtaining critical history from collateral sources if the patient is unable to provide it directly, and physician consultation. Specifics of interventions taken and potentially life-threatening diagnostic considerations are listed above in the medical decision making. CHIEF COMPLAINT       Chief Complaint   Patient presents with    Shortness of Breath     pt with tremors that started today in hands and feet after starting new med, pt also was 86% on RA. Placed on NRB and at 97%. [place] ems from home. HISTORY OF PRESENT ILLNESS   (Location, Timing/Onset, Context/Setting, Quality, Duration, Modifying Factors, Severity, Associated Signs and Symptoms)  Note limiting factors. Chief Complaint: Chills, hypoxia    Rachele Martinez is a 80 y.o. male who presents to the emergency department with a chief complaint of chills and \" trembling. \"  He states this began today. He states he wears oxygen just at night. He is unsure why he wears oxygen. Does not wear this every day. Denies any history of COPD, lung disease, CHF. He is currently on nasal cannula oxygen and now states he feels much better. Denies chest pain, abdominal pain, nausea, vomiting, fevers, urinary or bowel symptoms or any other symptoms. Denies cough.     Nursing Notes were all reviewed and agreed with or any disagreements were addressed in the HPI. REVIEW OF SYSTEMS    (2-9 systems for level 4, 10 or more for level 5)     Review of Systems    Positives and Pertinent negatives as per HPI. Except as noted above in the ROS, all other systems were reviewed and negative. PAST MEDICAL HISTORY     Past Medical History:   Diagnosis Date    BPH     Elevated prostate specific antigen (PSA)     Esophageal reflux     Fracture closed, humerus 12-9-09    Shoulder    HTN (hypertension)     Hyperlipidemia     Osteoarthritis     Peripheral neuropathy     Peripheral neuropathy     Type II or unspecified type diabetes mellitus without mention of complication, not stated as uncontrolled          SURGICAL HISTORY     Past Surgical History:   Procedure Laterality Date    ANKLE SURGERY  2002    CATARACT REMOVAL  10-03    Right    COLONOSCOPY  10-02    Normal    KNEE SURGERY  1998    Arthroscopic Right    REVISION TOTAL KNEE ARTHROPLASTY  1-14-09    Infected knee prosthesis removed, space placed(Right knee)    REVISION TOTAL KNEE ARTHROPLASTY  2-23-09    Right knee spacer removed and new joint inserted    SHOULDER SURGERY  02/09/10    Total Shoulder arthroplasty    TONSILLECTOMY  1927    TOTAL KNEE ARTHROPLASTY  4-20-07    Right    TOTAL KNEE ARTHROPLASTY  5-06    Left         CURRENTMEDICATIONS       Previous Medications    AMLODIPINE (NORVASC) 5 MG TABLET    TK 1 T PO QD    AMLODIPINE-ATORVASTATATIN (CADUET) 5-20 MG PER TABLET        ANASTROZOLE (ARIMIDEX) 1 MG CHEMO TABLET        ASCORBIC ACID (VITAMIN C) 250 MG TABLET    Take 250 mg by mouth daily    ATORVASTATIN (LIPITOR) 20 MG TABLET    TK 1 T PO QD    CYANOCOBALAMIN (VITAMIN B 12 PO)    Take by mouth    DOXAZOSIN (CARDURA) 4 MG TABLET    Take 1 tablet by mouth daily. DOXAZOSIN (CARDURA) 4 MG TABLET        FREESTYLE LITE STRIP        GABAPENTIN (NEURONTIN) 100 MG CAPSULE    Take 100 mg by mouth 4 times daily.     Hugh Burger 100 MG CAPSULE    TK 1 C PO BID    MEDIUM CHAIN TRIGLYCERIDES (MCT OIL PO)    Take by mouth    OMEPRAZOLE (PRILOSEC) 20 MG CAPSULE    Take 1 capsule by mouth daily. PANTOPRAZOLE (PROTONIX) 20 MG TABLET    Take 20 mg by mouth daily    RAPAFLO 8 MG CAPS        VITAMIN B-1 (THIAMINE) 100 MG TABLET    Take 100 mg by mouth daily         ALLERGIES     Morphine    FAMILYHISTORY       Family History   Problem Relation Age of Onset    Elevated Lipids Mother     Cancer Father         lung cancer    Emphysema Father           SOCIAL HISTORY       Social History     Tobacco Use    Smoking status: Never Smoker    Smokeless tobacco: Never Used   Substance Use Topics    Alcohol use: Yes    Drug use: No       SCREENINGS    [place] Coma Scale  Eye Opening: Spontaneous  Best Verbal Response: Oriented  Best Motor Response: Obeys commands  [place] Coma Scale Score: 15        PHYSICAL EXAM    (up to 7 for level 4, 8 or more for level 5)     ED Triage Vitals [04/20/22 2018]   BP Temp Temp Source Pulse Resp SpO2 Height Weight   (!) 114/99 99.2 °F (37.3 °C) Oral 73 18 (!) 86 % -- --       Physical Exam  Vitals and nursing note reviewed. Constitutional:       Appearance: He is well-developed. He is not diaphoretic. HENT:      Head: Atraumatic. Nose: Nose normal.   Eyes:      General:         Right eye: No discharge. Left eye: No discharge. Cardiovascular:      Rate and Rhythm: Normal rate and regular rhythm. Heart sounds: No murmur heard. No friction rub. No gallop. Pulmonary:      Effort: Pulmonary effort is normal. No respiratory distress. Breath sounds: No stridor. Rales present. No wheezing or rhonchi. Comments: Possible crackles of the left lung base but poor inspiratory effort. No retractions or accessory muscle use. Abdominal:      General: Bowel sounds are normal. There is no distension. Palpations: Abdomen is soft. There is no mass. Tenderness:  There is no abdominal though no pulmonary emboli seen through the lobar branches. 2. Patchy consolidative airspace disease seen in the lingula and bilateral   lower lobes, left greater than right felt to represent multifocal pneumonia. No reactive effusion. 3. Cardiomegaly, with coronary artery atherosclerotic disease and minimal   aortic valvular calcification. 4. Mildly enlarged precarinal lymph node felt likely reactive. No bulky   lymphadenopathy in the chest.   5. Multilevel degenerative changes are seen in the spine, with diffuse   osteopenia. No acute fracture. XR CHEST PORTABLE   Final Result   Mild cardiomegaly and mild central pulmonary congestion with hazy left   perihilar opacity extending inferiorly which could be due to atypical   pulmonary edema and/or early pneumonia. PROCEDURES   Unless otherwise noted below, none     Procedures    CRITICAL CARE TIME       CONSULTS:  IP CONSULT TO HOSPITALIST      EMERGENCY DEPARTMENT COURSE and DIFFERENTIAL DIAGNOSIS/MDM:   Vitals:    Vitals:    04/20/22 2240 04/20/22 2241 04/20/22 2242 04/20/22 2243   BP:       Pulse: 65 67 68 67   Resp:       Temp:       TempSrc:       SpO2: 96% 93% 93% 95%       Patient was given the following medications:  Medications   cefTRIAXone (ROCEPHIN) 1000 mg in sterile water 10 mL IV syringe (has no administration in time range)     And   levoFLOXacin (LEVAQUIN) 750 MG/150ML infusion 750 mg (has no administration in time range)   iopamidol (ISOVUE-370) 76 % injection 75 mL (75 mLs IntraVENous Given 4/20/22 2206)           Patient presented after he was having some chills and trembling tonight. Presented hypoxic in the [de-identified]. Does wear oxygen just as needed at home. Family states typically his oxygen saturation is in the low to mid 90s. CT reveals multifocal pneumonia. Due to this rapid COVID and flu swab was added. He is already had COVID. Low suspicion for COVID-19. Was started on antibiotics for this.   Initial lactate cannot be in order. Did discuss with nursing staff to get this drawn along with blood cultures. He is not tachypneic, tachycardic or febrile and has no leukocytosis however. No sirs criteria at this time. Given his hypoxia and pneumonia will be started on antibiotics and will be admitted to hospital for further work-up and treatment. FINAL IMPRESSION      1. Hypoxia    2. Pneumonia due to infectious organism, unspecified laterality, unspecified part of lung          DISPOSITION/PLAN   DISPOSITION Decision To Admit 04/20/2022 11:10:30 PM      PATIENT REFERRED TO:  No follow-up provider specified.     DISCHARGE MEDICATIONS:  New Prescriptions    No medications on file       DISCONTINUED MEDICATIONS:  Discontinued Medications    No medications on file              (Please note that portions of this note were completed with a voice recognition program.  Efforts were made to edit the dictations but occasionally words are mis-transcribed.)    Ravindra Carter PA-C (electronically signed)            Ravindra Carter PA-C  04/20/22 7973

## 2022-04-21 NOTE — H&P
Hospital Medicine History & Physical      PCP: Bonnie Pillai MD    Date of Admission: 4/20/2022    Date of Service: Pt seen/examined on 4/21/2022  and Admitted to Inpatient with expected LOS greater than two midnights due to medical therapy. Chief Complaint:    Chief Complaint   Patient presents with    Shortness of Breath     pt with tremors that started today in hands and feet after starting new med, pt also was 86% on RA. Placed on NRB and at 97%. [place] ems from home. History Of Present Illness: The patient is a 80 y.o. male with history of type 2 diabetes with neuropathy, hypertension, GERD, hyperlipidemia, BPH who presented with generalized malaise weakness, chills, tremors, and noted to have hypoxia with saturations of 86% room air. Chest x-ray done was noted for multifocal infiltrates with CT pulmonary angiogram was done with negative for PE but noted for multifocal pneumonia. He denies any fevers, no diaphoresis. No chest pains. No shortness of breath. No cough or production.   Procalcitonin at 0.31  EKG sinus rhythm    Past Medical History:        Diagnosis Date    BPH     Elevated prostate specific antigen (PSA)     Esophageal reflux     Fracture closed, humerus 12-9-09    Shoulder    HTN (hypertension)     Hyperlipidemia     Osteoarthritis     Peripheral neuropathy     Peripheral neuropathy     Type II or unspecified type diabetes mellitus without mention of complication, not stated as uncontrolled        Past Surgical History:        Procedure Laterality Date    ANKLE SURGERY  2002    CATARACT REMOVAL  10-03    Right    COLONOSCOPY  10-02    Normal    KNEE SURGERY  1998    Arthroscopic Right    REVISION TOTAL KNEE ARTHROPLASTY  1-14-09    Infected knee prosthesis removed, space placed(Right knee)    REVISION TOTAL KNEE ARTHROPLASTY  2-23-09    Right knee spacer removed and new joint inserted    SHOULDER SURGERY  02/09/10    Total Shoulder arthroplasty  TONSILLECTOMY  1927    TOTAL KNEE ARTHROPLASTY  4-20-07    Right    TOTAL KNEE ARTHROPLASTY  5-06    Left       Medications Prior to Admission:    Prior to Admission medications    Medication Sig Start Date End Date Taking? Authorizing Provider   gabapentin (NEURONTIN) 100 MG capsule Take 100 mg by mouth 4 times daily. Yes Historical Provider, MD   pantoprazole (PROTONIX) 20 MG tablet Take 20 mg by mouth daily   Yes Historical Provider, MD   Medium Chain Triglycerides (MCT OIL PO) Take by mouth   Yes Historical Provider, MD   Cyanocobalamin (VITAMIN B 12 PO) Take by mouth   Yes Historical Provider, MD   Ascorbic Acid (VITAMIN C) 250 MG tablet Take 250 mg by mouth daily   Yes Historical Provider, MD   vitamin B-1 (THIAMINE) 100 MG tablet Take 100 mg by mouth daily   Yes Historical Provider, MD   LYRICA 100 MG capsule TK 1 C PO BID  Patient not taking: Reported on 4/20/2022 4/17/18   Historical Provider, MD   atorvastatin (LIPITOR) 20 MG tablet TK 1 T PO QD  Patient not taking: Reported on 4/20/2022 3/3/18   Historical Provider, MD   amLODIPine (NORVASC) 5 MG tablet TK 1 T PO QD 3/3/18   Historical Provider, MD   amLODIPine-atorvastatatin (CADUET) 5-20 MG per tablet  9/15/14   Historical Provider, MD   anastrozole (ARIMIDEX) 1 MG chemo tablet  11/12/14   Historical Provider, MD   doxazosin (CARDURA) 4 MG tablet  10/26/14   Historical Provider, MD   FREESTYLE LITE strip  10/31/14   Historical Provider, MD   RAPAFLO 8 MG CAPS  11/11/14   Historical Provider, MD   omeprazole (PRILOSEC) 20 MG capsule Take 1 capsule by mouth daily. 11/24/10 11/24/11  Nick Storey III, MD   doxazosin (CARDURA) 4 MG tablet Take 1 tablet by mouth daily. 5/25/10 5/25/11  Nick Storey III, MD       Allergies:  Morphine    Social History:  The patient currently lives with family    TOBACCO:   reports that he has never smoked. He has never used smokeless tobacco.  ETOH:   reports current alcohol use.       Family History:  Reviewed in detail and negative for DM, Early CAD, Cancer, CVA. Positive as follows:        Problem Relation Age of Onset    Elevated Lipids Mother     Cancer Father         lung cancer    Emphysema Father        REVIEW OF SYSTEMS:   Positive for generalized malaise weakness, hypoxia and as noted in the HPI. All other systems reviewed and negative. PHYSICAL EXAM:    BP (!) 152/71   Pulse 66   Temp 99.2 °F (37.3 °C) (Oral)   Resp 18   SpO2 95%     General appearance: No apparent distress appears stated age and cooperative. HEENT Normal cephalic, atraumatic without obvious deformity. Pupils equal, round, and reactive to light. Extra ocular muscles intact. Conjunctivae/corneas clear. Neck: Supple, No jugular venous distention/bruits. Lungs: Clear to auscultation, bilaterally without Rales/Wheezes/Rhonchi with good respiratory effort. Heart: Regular rate and rhythm with Normal S1/S2 without murmurs, rubs or gallops  Abdomen: Soft, non-tender or non-distended without rigidity or guarding and positive bowel sounds  Extremities: No clubbing, cyanosis, or edema bilaterally. Skin: Skin color, texture, turgor normal.  No rashes or lesions. Neurologic: Alert and oriented X 3, neurovascularly intact with sensory/motor intact upper extremities/lower extremities, bilaterally. Cranial nerves: II-XII intact, grossly non-focal.  Mental status: Alert, oriented, thought content appropriate.       CBC   Recent Labs     04/20/22 2023   WBC 11.2*   HGB 13.2*   HCT 42.0         RENAL  Recent Labs     04/20/22 2023      K 4.3      CO2 28   BUN 16   CREATININE 1.1     LFT'S  Recent Labs     04/20/22 2023   AST 24   ALT 15   BILITOT 0.5   ALKPHOS [address] [place] [address] [address] Problems    Diagnosis Date Noted    Multifocal pneumonia [J18.9] 04/21/2022     Priority: Medium    Type 2 diabetes mellitus ([facility] [E11.9] 04/21/2022     Priority: Medium    Acute respiratory failure with hypoxia ([facility] [J96.01] 04/21/2022 Priority: Medium    HTN (hypertension) [I10]     Esophageal reflux [K21.9]          ASSESSMENT/PLAN:  80 y.o. male with history of type 2 diabetes with neuropathy, hypertension, GERD, hyperlipidemia, BPH who presented with generalized malaise weakness, chills, tremors,hypoxia found to have multifocal pneumonia complicated by hypoxia. Plan:  - Continue IV antibiotics for culture, pneumonia  - O2 supplementation with [place] greater than 90%  - Continue sliding scale insulin for glycemic control  - Continue other chronic home medication for blood pressure management, GERD  - PT OT evaluation reviewed discharge planning      DVT Prophylaxis: Subcu enoxaparin  Diet: Diabetic diet  Code Status: Full       Annamaria Bradley MD    Thank you Kendy Medina MD for the opportunity to be involved in this patient's care. If you have any questions or concerns please feel free to contact me at [phone].

## 2022-04-21 NOTE — ED NOTES
Patient on 4L NC. Per son, patient is normally on O2 but doesn't know how many liters. States patient Almita Sanchez been hard headed about wearing his oxygen\".      Grey Rosenberg RN  04/20/22 3218

## 2022-04-21 NOTE — PROGRESS NOTES
04/21/22 0351   RT Protocol   History Pulmonary Disease 0   Respiratory pattern 0   Breath sounds 4   Cough 0   Bronchodilator Assessment Score 4

## 2022-04-21 NOTE — PROGRESS NOTES
Pt having n/v, PRN zofran given. Pt reports trouble eating lunch, appeared to almost be choking, SLP priscilla ordered. Jonathan Richmond NP notified.

## 2022-04-21 NOTE — PROGRESS NOTES
4 Eyes Skin Assessment     NAME:  Naima Daley OF BIRTH:  12/22/1925  MEDICAL RECORD NUMBER:  [id number]    The patient is being assess for  Admission    I agree that 2 RN's have performed a thorough Head to Toe Skin Assessment on the patient. ALL assessment sites listed below have been assessed. Areas assessed by both nurses:    Head, Face, Ears, Shoulders, Back, Chest, Arms, Elbows, Hands, Sacrum. Buttock, Coccyx, Ischium and Legs. Feet and Heels        Does the Patient have a Wound?  No noted wound(s)     Redness on penis  Bautista Prevention initiated:  No   Wound Care Orders initiated:  No    Pressure Injury (Stage 3,4, Unstageable, DTI, NWPT, and Complex wounds) if present place consult order under [de-identified] No    New and Established Ostomies if present place consult order under : No      Nurse 1 eSignature: Electronically signed by James Gresham RN on 4/21/22 at 3:16 AM EDT    **SHARE this note so that the co-signing nurse is able to place an eSignature**    Nurse 2 eSignature: Electronically signed by Marina Tracey RN on 4/25/22 at 11:46 AM EDT

## 2022-04-21 NOTE — ED PROVIDER NOTES
Triage EKG:    The 12 lead EKG was interpreted by me as follows:  Rate: normal with a rate of 71  Rhythm: sinus  Axis: normal  Intervals: LAFB, normal QTc and first deg AVB  LVH changes  ST segments: no ST elevations or depressions  T waves: no abnormal inversions  Non-specific T wave changes: present  Prior EKG comparison: No prior is currently available for comparison        Cora Beavers MD  04/20/22 2025

## 2022-04-21 NOTE — PROGRESS NOTES
[facility]    4/21/2022 11:36 AM        Name: Rachele Martinez . Admitted: 4/20/2022  Primary Care Provider: Sanket Espino MD (Tel: [phone])      Chief Complaint   Patient presents with    Shortness of Breath     pt with tremors that started today in hands and feet after starting new med, pt also was 86% on RA. Placed on NRB and at 97%. [place] ems from home. Brief History: Patient is a 79 yo male with hx BPH, GERD, HTN, HLD, DM2. He presented to hospital with malaise, weakness, chills. Noted to be hypoxic in ER with saturation 86% on room air. CXR with multifocal infiltrates, CT pulmonary negative for PE but also shows multifocal pneumonia. He was admitted and started on IV antibiotics. Subjective:  Presently up in bedside chair, son visiting. Patient reports he feels fine, eager for discharge. Denies shortness of breath, cough, chest pain, abdominal pain, nausea. Currently on O2 at 5 liters, baseline is nocturnal use only, he is unsure of flow rate.      Reviewed interval ancillary notes    Current Medications  doxazosin (CARDURA) tablet 4 mg, Daily  gabapentin (NEURONTIN) capsule 600 mg, 4x Daily  pantoprazole (PROTONIX) tablet 40 mg, BID AC  sodium chloride flush 0.9 % injection 5-40 mL, 2 times per day  sodium chloride flush 0.9 % injection 5-40 mL, PRN  0.9 % sodium chloride infusion, PRN  enoxaparin (LOVENOX) injection 40 mg, Daily  ondansetron (ZOFRAN-ODT) disintegrating tablet 4 mg, Q8H PRN   Or  ondansetron (ZOFRAN) injection 4 mg, Q6H PRN  polyethylene glycol (GLYCOLAX) packet 17 g, Daily PRN  acetaminophen (TYLENOL) tablet 650 mg, Q6H PRN   Or  acetaminophen (TYLENOL) suppository 650 mg, Q6H PRN  albuterol (PROVENTIL) nebulizer solution 2.5 mg, Q4H PRN  [START ON 4/22/2022] cefTRIAXone (ROCEPHIN) 1000 mg in sterile water 10 mL IV syringe, Q24H   And  azithromycin (ZITHROMAX) tablet 500 mg, Q24H  glucose (GLUTOSE) 40 % oral gel 15 g, PRN  dextrose 50 % IV solution, PRN  glucagon (rDNA) injection 1 mg, PRN  dextrose 5 % solution, PRN  insulin lispro (HUMALOG) injection vial 0-12 Units, TID WC  insulin lispro (HUMALOG) injection vial 0-6 Units, Nightly  albuterol (PROVENTIL) nebulizer solution 2.5 mg, BID  amLODIPine (NORVASC) tablet 5 mg, Daily        Objective:  /62   Pulse 74   Temp 99.3 °F (37.4 °C) (Oral)   Resp 18   Ht 5' 11\" (1.803 m)   Wt 253 lb 11.2 oz (115.1 kg)   SpO2 90%   BMI 35.38 kg/m²     Intake/Output Summary (Last 24 hours) at 4/21/2022 1136  Last data filed at 4/21/2022 3451  Gross per 24 hour   Intake 120 ml   Output 700 ml   Net -580 ml      Wt Readings from Last 3 Encounters:   04/21/22 253 lb 11.2 oz (115.1 kg)   04/23/18 210 lb (95.3 kg)   02/02/15 200 lb (90.7 kg)       General appearance:  Appears comfortable  Eyes: Sclera clear. Pupils equal.  ENT: Moist oral mucosa. Trachea midline, no adenopathy. Cardiovascular: Regular rhythm, normal S1, S2. No murmur. No edema in lower extremities  Respiratory: Not using accessory muscles. Good inspiratory effort. Diminished in lower lobes, no wheeze or crackles. GI: Abdomen soft, no tenderness, not distended, normal bowel sounds  Musculoskeletal: No cyanosis in digits, neck supple  Neurology: CN 2-12 grossly intact. No speech or motor deficits  Psych: Normal affect. Alert and oriented in time, place and person  Skin: Warm, dry, normal turgor    Labs and Tests:  CBC:   Recent Labs     04/20/22 2023   WBC 11.2*   HGB 13.2*        BMP:    Recent Labs     04/20/22 2023      K 4.3      CO2 28   BUN 16   CREATININE 1.1   GLUCOSE 177*     Hepatic:   Recent Labs     04/20/22 2023   AST 24   ALT 15   BILITOT 0.5   ALKPHOS 71     Results for Marctha Phuong (MRN [id number]) as of 4/21/2022 19:36   Ref.  Range 4/21/2022 01:36 4/21/2022 08:37 4/21/2022 12:46   POC Glucose Latest Ref Range: 70 - 99 mg/dl 113 (H) 155 (H) 105 (H)         CXR 4/21/2022:  Mild cardiomegaly and mild central pulmonary congestion with hazy left   perihilar opacity extending inferiorly which could be due to atypical   pulmonary edema and/or early pneumonia. CT Pulmonary 4/21/2022:  1. Limited exam, though no pulmonary emboli seen through the lobar branches. 2. Patchy consolidative airspace disease seen in the lingula and bilateral   lower lobes, left greater than right felt to represent multifocal pneumonia. No reactive effusion. 3. Cardiomegaly, with coronary artery atherosclerotic disease and minimal   aortic valvular calcification. 4. Mildly enlarged precarinal lymph node felt likely reactive.  No bulky   lymphadenopathy in the chest.   5. Multilevel degenerative changes are seen in the spine, with diffuse   osteopenia.  No acute fracture. Problem List  Principal Problem:    Multifocal pneumonia  Active Problems:    Type 2 diabetes mellitus (HCC)    Acute respiratory failure with hypoxia (HCC)    Esophageal reflux    HTN (hypertension)  Resolved Problems:    * No resolved hospital problems. *       Assessment & Plan:   1. Multifocal pneumonia. Presented with malaise and chills. CXR and CT scan with multifocal infiltrates. COVID-19 and influenza screen are negative. WBC 11.2, procal 0.31. He received IV ceftriaxone and levofloxacin in ER, started on ceftriaxone and azithromycin on admission. Continue bronchodilators, IS. Speech therapy evaluation pending, noted to have coughing with po intake. Monitor CBC. 2. Acute hypoxic respiratory failure. Noted to have O2 sat 86% on room air and increased work of breathing. Secondary to multifocal pneumonia. O2 sats currently low 90s on 5 liters, baseline is nocturnal use only. Treatment as above. 3. DM2, controlled. A1c 6.5. Being covered with medium dose correction. BG values reasonably controlled. 4. GERD. Continue PPI. 5. HTN. Controlled.  Continue amlodipine, doxazosin. Diet: ADULT DIET;  Regular  Code:Full Code  DVT PPX: enoxaparin      MURALI Vasquez CNP   4/21/2022 11:36 AM

## 2022-04-21 NOTE — ED NOTES
Patient transported to floor by this RN in stable condition, alert and oriented x4 on portable tele with all patient belongings on 4L NC.       Gloriann Opitz, RN  04/21/22 0190

## 2022-04-22 LAB
AMMONIA: 38 UMOL/L (ref 16–60)
ANION GAP SERPL CALCULATED.3IONS-SCNC: 8 MMOL/L (ref 3–16)
BASE EXCESS ARTERIAL: 7.2 MMOL/L (ref -3–3)
BASE EXCESS ARTERIAL: 7.3 MMOL/L (ref -3–3)
BASE EXCESS ARTERIAL: 8.7 MMOL/L (ref -3–3)
BASOPHILS ABSOLUTE: 0 K/UL (ref 0–0.2)
BASOPHILS RELATIVE PERCENT: 0.3 %
BUN BLDV-MCNC: 17 MG/DL (ref 7–20)
CALCIUM SERPL-MCNC: 9.4 MG/DL (ref 8.3–10.6)
CARBOXYHEMOGLOBIN ARTERIAL: 1.3 % (ref 0–1.5)
CARBOXYHEMOGLOBIN ARTERIAL: 1.4 % (ref 0–1.5)
CARBOXYHEMOGLOBIN ARTERIAL: 1.6 % (ref 0–1.5)
CHLORIDE BLD-SCNC: 100 MMOL/L (ref 99–110)
CO2: 31 MMOL/L (ref 21–32)
CREAT SERPL-MCNC: 1.1 MG/DL (ref 0.8–1.3)
EKG ATRIAL RATE: 71 BPM
EKG DIAGNOSIS: NORMAL
EKG P AXIS: -22 DEGREES
EKG P-R INTERVAL: 254 MS
EKG Q-T INTERVAL: 386 MS
EKG QRS DURATION: 114 MS
EKG QTC CALCULATION (BAZETT): 419 MS
EKG R AXIS: -46 DEGREES
EKG T AXIS: 72 DEGREES
EKG VENTRICULAR RATE: 71 BPM
EOSINOPHILS ABSOLUTE: 0.1 K/UL (ref 0–0.6)
EOSINOPHILS RELATIVE PERCENT: 0.9 %
GFR AFRICAN AMERICAN: >60
GFR NON-AFRICAN AMERICAN: >60
GLUCOSE BLD-MCNC: 110 MG/DL (ref 70–99)
GLUCOSE BLD-MCNC: 117 MG/DL (ref 70–99)
GLUCOSE BLD-MCNC: 124 MG/DL (ref 70–99)
GLUCOSE BLD-MCNC: 130 MG/DL (ref 70–99)
GLUCOSE BLD-MCNC: 158 MG/DL (ref 70–99)
HCO3 ARTERIAL: 35.3 MMOL/L (ref 21–29)
HCO3 ARTERIAL: 35.3 MMOL/L (ref 21–29)
HCO3 ARTERIAL: 35.9 MMOL/L (ref 21–29)
HCT VFR BLD CALC: 37.4 % (ref 40.5–52.5)
HEMOGLOBIN, ART, EXTENDED: 12.3 G/DL (ref 13.5–17.5)
HEMOGLOBIN, ART, EXTENDED: 12.3 G/DL (ref 13.5–17.5)
HEMOGLOBIN, ART, EXTENDED: 12.5 G/DL (ref 13.5–17.5)
HEMOGLOBIN: 11.8 G/DL (ref 13.5–17.5)
LYMPHOCYTES ABSOLUTE: 1.3 K/UL (ref 1–5.1)
LYMPHOCYTES RELATIVE PERCENT: 20.4 %
MCH RBC QN AUTO: 27.9 PG (ref 26–34)
MCHC RBC AUTO-ENTMCNC: 31.7 G/DL (ref 31–36)
MCV RBC AUTO: 88.1 FL (ref 80–100)
METHEMOGLOBIN ARTERIAL: 0.5 %
METHEMOGLOBIN ARTERIAL: 0.6 %
METHEMOGLOBIN ARTERIAL: 0.6 %
MONOCYTES ABSOLUTE: 0.5 K/UL (ref 0–1.3)
MONOCYTES RELATIVE PERCENT: 8.2 %
NEUTROPHILS ABSOLUTE: 4.4 K/UL (ref 1.7–7.7)
NEUTROPHILS RELATIVE PERCENT: 70.2 %
O2 SAT, ARTERIAL: 68.1 %
O2 SAT, ARTERIAL: 96.4 %
O2 SAT, ARTERIAL: 98.9 %
O2 THERAPY: ABNORMAL
PCO2 ARTERIAL: 56.4 MMHG (ref 35–45)
PCO2 ARTERIAL: 66.2 MMHG (ref 35–45)
PCO2 ARTERIAL: 72.6 MMHG (ref 35–45)
PDW BLD-RTO: 16.4 % (ref 12.4–15.4)
PERFORMED ON: ABNORMAL
PH ARTERIAL: 7.3 (ref 7.35–7.45)
PH ARTERIAL: 7.33 (ref 7.35–7.45)
PH ARTERIAL: 7.4 (ref 7.35–7.45)
PLATELET # BLD: 175 K/UL (ref 135–450)
PMV BLD AUTO: 7.7 FL (ref 5–10.5)
PO2 ARTERIAL: 111 MMHG (ref 75–108)
PO2 ARTERIAL: 35.6 MMHG (ref 75–108)
PO2 ARTERIAL: 85.1 MMHG (ref 75–108)
POTASSIUM REFLEX MAGNESIUM: 4.6 MMOL/L (ref 3.5–5.1)
RBC # BLD: 4.24 M/UL (ref 4.2–5.9)
REPORT: NORMAL
RESPIRATORY PANEL PCR: NORMAL
SODIUM BLD-SCNC: 139 MMOL/L (ref 136–145)
TCO2 ARTERIAL: 82.9 MMOL/L
TCO2 ARTERIAL: 83.6 MMOL/L
TCO2 ARTERIAL: 85.5 MMOL/L
WBC # BLD: 6.2 K/UL (ref 4–11)

## 2022-04-22 PROCEDURE — 6360000002 HC RX W HCPCS: Performed by: INTERNAL MEDICINE

## 2022-04-22 PROCEDURE — 82803 BLOOD GASES ANY COMBINATION: CPT

## 2022-04-22 PROCEDURE — 6370000000 HC RX 637 (ALT 250 FOR IP): Performed by: NURSE PRACTITIONER

## 2022-04-22 PROCEDURE — 6370000000 HC RX 637 (ALT 250 FOR IP): Performed by: INTERNAL MEDICINE

## 2022-04-22 PROCEDURE — 2580000003 HC RX 258: Performed by: INTERNAL MEDICINE

## 2022-04-22 PROCEDURE — 51702 INSERT TEMP BLADDER CATH: CPT

## 2022-04-22 PROCEDURE — 94761 N-INVAS EAR/PLS OXIMETRY MLT: CPT

## 2022-04-22 PROCEDURE — 94640 AIRWAY INHALATION TREATMENT: CPT

## 2022-04-22 PROCEDURE — 93010 ELECTROCARDIOGRAM REPORT: CPT | Performed by: INTERNAL MEDICINE

## 2022-04-22 PROCEDURE — 36600 WITHDRAWAL OF ARTERIAL BLOOD: CPT

## 2022-04-22 PROCEDURE — 99291 CRITICAL CARE FIRST HOUR: CPT | Performed by: INTERNAL MEDICINE

## 2022-04-22 PROCEDURE — 0202U NFCT DS 22 TRGT SARS-COV-2: CPT

## 2022-04-22 PROCEDURE — 94660 CPAP INITIATION&MGMT: CPT

## 2022-04-22 PROCEDURE — 2700000000 HC OXYGEN THERAPY PER DAY

## 2022-04-22 PROCEDURE — 80048 BASIC METABOLIC PNL TOTAL CA: CPT

## 2022-04-22 PROCEDURE — 51798 US URINE CAPACITY MEASURE: CPT

## 2022-04-22 PROCEDURE — 92526 ORAL FUNCTION THERAPY: CPT

## 2022-04-22 PROCEDURE — 82140 ASSAY OF AMMONIA: CPT

## 2022-04-22 PROCEDURE — 51701 INSERT BLADDER CATHETER: CPT

## 2022-04-22 PROCEDURE — 36415 COLL VENOUS BLD VENIPUNCTURE: CPT

## 2022-04-22 PROCEDURE — 85025 COMPLETE CBC W/AUTO DIFF WBC: CPT

## 2022-04-22 PROCEDURE — 2000000000 HC ICU R&B

## 2022-04-22 RX ORDER — QUETIAPINE FUMARATE 50 MG/1
50 TABLET, EXTENDED RELEASE ORAL ONCE
Status: COMPLETED | OUTPATIENT
Start: 2022-04-22 | End: 2022-04-22

## 2022-04-22 RX ADMIN — Medication 10 ML: at 21:00

## 2022-04-22 RX ADMIN — Medication 10 ML: at 09:48

## 2022-04-22 RX ADMIN — INSULIN LISPRO 1 UNITS: 100 INJECTION, SOLUTION INTRAVENOUS; SUBCUTANEOUS at 21:00

## 2022-04-22 RX ADMIN — ALBUTEROL SULFATE 2.5 MG: 2.5 SOLUTION RESPIRATORY (INHALATION) at 09:16

## 2022-04-22 RX ADMIN — ENOXAPARIN SODIUM 40 MG: 100 INJECTION SUBCUTANEOUS at 09:48

## 2022-04-22 RX ADMIN — ALBUTEROL SULFATE 2.5 MG: 2.5 SOLUTION RESPIRATORY (INHALATION) at 20:41

## 2022-04-22 RX ADMIN — PANTOPRAZOLE SODIUM 40 MG: 40 TABLET, DELAYED RELEASE ORAL at 05:08

## 2022-04-22 RX ADMIN — QUETIAPINE FUMARATE 50 MG: 50 TABLET, EXTENDED RELEASE ORAL at 03:15

## 2022-04-22 RX ADMIN — AZITHROMYCIN MONOHYDRATE 500 MG: 250 TABLET ORAL at 05:08

## 2022-04-22 RX ADMIN — GABAPENTIN 600 MG: 300 CAPSULE ORAL at 21:18

## 2022-04-22 ASSESSMENT — PAIN SCALES - GENERAL
PAINLEVEL_OUTOF10: 0
PAINLEVEL_OUTOF10: 0

## 2022-04-22 NOTE — CONSULTS
Palliative Care:        pt with tremors that started today in hands and feet after starting new med, pt also was 86% on RA. Placed on NRB and at 97%. [place] ems from home. 80 y.o. male with history of type 2 diabetes with neuropathy, hypertension, GERD, hyperlipidemia, BPH who presented with generalized malaise weakness, chills, tremors, and noted to have hypoxia with saturations of 86% room air. Chest x-ray done was noted for multifocal infiltrates with CT pulmonary angiogram was done with negative for PE but noted for multifocal pneumonia. He denies any fevers, no diaphoresis. No chest pains. No shortness of breath. No cough or production. Procalcitonin at 0.31. EKG sinus rhythm. Admit 4/20; Consult 4/22    Past Medical History:   has a past medical history of BPH, Elevated prostate specific antigen (PSA), Esophageal reflux, Fracture closed, humerus, HTN (hypertension), Hyperlipidemia, Osteoarthritis, Peripheral neuropathy, Peripheral neuropathy, and Type II or unspecified type diabetes mellitus without mention of complication, not stated as uncontrolled. Past Surgical History:   has a past surgical history that includes knee surgery (1998); Ankle surgery (2002); Tonsillectomy (1927); shoulder surgery (02/09/10); Colonoscopy (10-02); Cataract removal (10-03); Total knee arthroplasty (4-20-07); Revision total knee arthroplasty (1-14-09); Revision total knee arthroplasty (2-23-09); and Total knee arthroplasty (5-06).     Advance Directives:        FULL CODE; No ACP docs on file    Problem Severity: Pain/Other Symptoms:        Pt now being placed on BiPAP mask with risk of intubation if BiPAP isn't sufficient    Bed Mobility/Toileting/Transfer:        No PT/OT notes yet this admission    Performance Status:        Palliative Performance Scale:  100% []Full Normal activity & work No evidence of disease  90%   [] Full Normal activity & work Some evidence of disease  80%   [] Full Normal activity with and requested time to speak with her brothers/pt's sons before making that decision. She was informed that, in the event something happens before she calls back, he would receive chest compressions, shock, intubation, and meds. She was agreeable. Awaiting call back.     Electronically signed by MALLORIE Stahl RN

## 2022-04-22 NOTE — PROGRESS NOTES
Patient acting biligerant. Hallucinating. ABG stat ordered. Retaining urine. Bladder scan =636 ml/ Straight cathed. seroquel x1 dose. Will continue to monitor.

## 2022-04-22 NOTE — PROGRESS NOTES
Respiratory completed ABG. 02 came back critical at 35 but not accurate due to 02 coming unhooked from wall. Will continue to monitor.

## 2022-04-22 NOTE — PROGRESS NOTES
Primary RN, Leandro Mcardle, asked this RN to go look at pt's escobar catheter d/t blood in the urine. Large clot noted in catheter tubing. Kristin syringe attached to catheter and clot evacuated. Escobar catheter irrigated with 20 cc sterile water. Patency confirmed. No further clots noted. Pt alert at this time. Able to state name, , location, and year. Discussed code status with patient. Pt agreeable to CPR \"if that's what you need to do\". Primary RN updated on pt status.  Electronically signed by Wes Lind RN on 2022 at 2:38 PM

## 2022-04-22 NOTE — PROGRESS NOTES
BiPap placed by RT. Will check ABG in one hour. Pt educated on reason for BiPap, verbalized understanding. PCA at bedside. Camera in place. 1516: Attempted to call easton Jaime, left voicemail. 1530: Easton Jaime updated on pt status and POC.

## 2022-04-22 NOTE — PROGRESS NOTES
Shift assessment and vitals obtained by Giorgi Acuña. Pt responsive to voice only, currently resting in bed with eyes closed. Will give scheduled medications when safe to administer and pt more alert. Camera remains in room. Fall precautions in place. No s/s of distress observed. 0957: Pt remains lethargic. Increase in tremors observed. AM meds held. Pt bladder scanned, showed 386mL. Oswaldo Corbin NP notified.

## 2022-04-22 NOTE — PROGRESS NOTES
BP elevated, Angel Fire Scales NP notified. Pt remains on bipap. Pt has been NPO this afternoon d/t lethargy but appears more awake / oriented to self. Awaiting hospitalist to eval pt at bedside.

## 2022-04-22 NOTE — PROGRESS NOTES
Fernández inserted for urinary retention by Maricruz Ballesteros RN. Sterile technique used. Pt tolerated fairly well. Obtained 275mL of yellow, clear urine.

## 2022-04-22 NOTE — PROGRESS NOTES
Facility/Department: [address] ONCOLOGY  Speech Language Pathology   Dysphagia Treatment Note    Patient: Buck Mott   : 1925   MRN: [id number]      Evaluation Date: 2022      Admitting Dx: Hypoxia [R09.02]  Pneumonia due to infectious organism, unspecified laterality, unspecified part of lung [J18.9]  Multifocal pneumonia [J18.9]  Treatment Diagnosis: Oropharyngeal Dysphagia   Pain: denies                   Diet and Treatment Recommendations 2022:  Diet Solids Recommendation:  NPO  Liquid Consistency Recommendation:  NPO Recommended form of Meds: Meds in puree      Recommend completion of Modified Barium Swallow study to further assess pharyngeal phase of swallow (orders received, unable to be completed this date)     Compensatory strategies: Alternate solids/liquids , Upright as possible with all PO intake , Chin tuck , Small bites/sips , Eat/feed slowly, Remain upright 30-45 min     Assessment of Texture Tolerance:  Diet level prior to treatment: Dysphagia III Soft and bite sized  with Thin liquids   Tolerance of Current Diet Level:Pt with no intake this date due to level of alertness     Impressions: Pt with MBS ordered this date. RN reported pt was given medication to assist with sleep last PM and has been lethargic this morning. Pt was evaluated at bedside to determine candidacy for MBS. Pt was positioned Upright in bed , lethargic . Currently on 5L O2 via nasal cannula . Trials of ice chips , thin liquids and puree  were provided to assess swallow function. Oral phase characterized by mildly reduced oral acceptance and labial seal, minimal anterior spillage of thin liquids. Reduced bolus control, suspect premature spillage. Pharyngeal phase characterized by delayed swallow initiation and reduced laryngeal elevation. Pt with intermittent throat clears across consistencies, suspect delayed pharyngeal clearing. Pt did not attempt to complete chin tuck maneuver.  Pt demonstrates increased risk for aspiration due to cognitive state  and co morbidities . Based on today's assessment recommend Dysphagia III Soft and bite sized  with Thin liquids , Meds in puree . Addendum: Following session, RN reported that pt is retaining CO2 and is now requiring Bipap. Pt may transfer to the ICU. At this time due to overall medical status, prior hx of aspiration and s/s of aspiration assessed at bedside recommend consideration of NPO pending ongoing SLP follow-up as pt is able to tolerate. Dysphagia Goals:   Pt will functionally tolerate recommended diet with no overt clinical s/s of aspiration (Ongoing 04/22/22)  Pt will demonstrate understanding of aspiration risk and precautions via education/demonstration with occasional prompting (Ongoing 04/22/22)  Pt will advance to least restrictive diet as indicated (Ongoing 04/22/22)  Pt will participate in Modified Barium swallow study to further assess pharyngeal phase of swallow, assist with diet recommendations and to further direct plan of care (Ongoing 04/22/22)    Plan:   3-5 times per week during acute care stay. Patient/Family Education:  Provided education regarding role of SLP, recommendations and general speech pathology plan of care. [] Pt verbalized understanding and agreement   [x] Pt requires ongoing learning   [] No evidence of comprehension     Discharge Recommendations:    Recommend ongoing SLP for dysphagia therapy upon discharge from hospital     Timed Code Treatment:  0 minutes     Total Treatment Time: 30 minutes     If patient discharges prior to next session this note will serve as a discharge summary.      Annmarie Junior, [address] [facility]  Speech Language Pathologist

## 2022-04-22 NOTE — CONSULTS
PULMONARY AND CRITICAL CARE MEDICINE CONSULT NOTE      Name: Diana Dee  Sex: male  : 1925  MRN: [id number]  Admission Date: 2022  Admission Diagnosis: Hypoxia [R09.02]  Pneumonia due to infectious organism, unspecified laterality, unspecified part of lung [J18.9]  Multifocal pneumonia [J18.9]  Date of ICU admission: 2022    Reason for ICU admission: Acute hypercapnic and hypoxic respiratory failure    HPI: Patient is a 80 y.o. male with past medical history significant for BPH, GERD, hypertension, hyperlipidemia, diabetes mellitus type 2 who presented to hospital with complaints of malaise, chills and weakness with shortness of breath. Going on for few days. Patient was noted to be hypoxic in the ER. He was admitted for multifocal pneumonia. Started on IV antibiotics. He has been having some periods of lethargy and confusion. ABG was performed which was suggestive of acute hypercapnia. When I saw the patient patient was pleasantly confused but able to answer questions. He complained of some shortness of breath but denies any cough or wheezing or chest pain or hemoptysis. He stated that he is a lifelong non-smoker. On 5 L/min oxygen and saturating 98 to 99%. No respiratory distress. 14 point ROS could not be obtained due to patient factors. Patient is pleasantly confused.        Past Medical History:   Diagnosis Date    BPH     Elevated prostate specific antigen (PSA)     Esophageal reflux     Fracture closed, humerus 09    Shoulder    HTN (hypertension)     Hyperlipidemia     Osteoarthritis     Peripheral neuropathy     Peripheral neuropathy     Type II or unspecified type diabetes mellitus without mention of complication, not stated as uncontrolled      Past Surgical History:   Procedure Laterality Date    ANKLE SURGERY      CATARACT REMOVAL  10-03    Right    COLONOSCOPY  10-02    Normal    KNEE SURGERY      Arthroscopic Right    REVISION TOTAL KNEE ARTHROPLASTY  1-14-09    Infected knee prosthesis removed, space placed(Right knee)    REVISION TOTAL KNEE ARTHROPLASTY  2-23-09    Right knee spacer removed and new joint inserted    SHOULDER SURGERY  02/09/10    Total Shoulder arthroplasty    TONSILLECTOMY  1927    TOTAL KNEE ARTHROPLASTY  4-20-07    Right    TOTAL KNEE ARTHROPLASTY  5-06    Left     Social History     Socioeconomic History    Marital status:      Spouse name: Not on file    Number of children: Not on file    Years of education: Not on file    Highest education level: Not on file   Occupational History    Occupation: Retired   Tobacco Use    Smoking status: Never Smoker    Smokeless tobacco: Never Used   Substance and Sexual Activity    Alcohol use: Yes    Drug use: No    Sexual activity: Not on file   Other Topics Concern    Not on file   Social History Narrative    Not on file     Social Determinants of Health     Financial Resource Strain:     Difficulty of Paying Living Expenses: Not on file   Food Insecurity:     Worried About Running Out of Food in the Last Year: Not on file    Bertrand of Food in the Last Year: Not on file   Transportation Needs:     Lack of Transportation (Medical): Not on file    Lack of Transportation (Non-Medical):  Not on file   Physical Activity:     Days of Exercise per Week: Not on file    Minutes of Exercise per Session: Not on file   Stress:     Feeling of Stress : Not on file   Social Connections:     Frequency of Communication with Friends and Family: Not on file    Frequency of Social Gatherings with Friends and Family: Not on file    Attends [employer]ism Services: Not on file    Active Member of Clubs or Organizations: Not on file    Attends Club or Organization Meetings: Not on file    Marital Status: Not on file   Intimate Partner Violence:     Fear of Current or Ex-Partner: Not on file    Emotionally Abused: Not on file    Physically Abused: Not on file   Nikhil Self Sexually Abused: Not on file   Housing Stability:     Unable to Pay for Housing in the Last Year: Not on file    Number of Places Lived in the Last Year: Not on file    Unstable Housing in the Last Year: Not on file     Family History   Problem Relation Age of Onset    Elevated Lipids Mother     Cancer Father         lung cancer    Emphysema Father      Allergies   Allergen Reactions    Morphine Other (See Comments)     Confusion & agitation       MEDICATIONS:     Scheduled Meds:     doxazosin  4 mg Oral Daily    gabapentin  600 mg Oral 4x Daily    pantoprazole  40 mg Oral BID AC    sodium chloride flush  5-40 mL IntraVENous 2 times per day    enoxaparin  40 mg SubCUTAneous Daily    cefTRIAXone (ROCEPHIN) IV  1,000 mg IntraVENous Q24H    And    azithromycin  500 mg Oral Q24H    insulin lispro  0-12 Units SubCUTAneous TID WC    insulin lispro  0-6 Units SubCUTAneous Nightly    albuterol  2.5 mg Nebulization BID    amLODIPine  5 mg Oral Daily     Current Infusions:    sodium chloride      dextrose         PRN meds:  sodium chloride flush, sodium chloride, ondansetron **OR** ondansetron, polyethylene glycol, acetaminophen **OR** acetaminophen, albuterol, glucose, dextrose, glucagon (rDNA), dextrose    PHYSICAL EXAM:    BP (!) 169/78   Pulse 74   Temp 99.7 °F (37.6 °C) (Oral)   Resp 18   Ht 5' 11\" (1.803 m)   Wt 253 lb 11.2 oz (115.1 kg)   SpO2 94%   BMI 35.38 kg/m²  I/O last 3 completed shifts: In: 240 [P.O.:240]  Out: 1800 [Urine:1800] I/O this shift: In: 5 [I.V.:5]  Out: 275 [Urine:275]      Intake/Output Summary (Last 24 hours) at 4/22/2022 1451  Last data filed at 4/22/2022 1105  Gross per 24 hour   Intake 5 ml   Output 1375 ml   Net -1370 ml         CONSTITUTIONAL: He is a 80y.o.-year-old who appears his stated age. He is in no acute distress. CARDIOVASCULAR: S1 S2 RRR. Without murmer  RESPIRATORY & CHEST: Lungs are clear to auscultation and percussion. No wheezing, no crackles. Good air movement  GASTROINTESTINAL & ABDOMEN: Soft, nontender, positive bowel sounds in all quadrants, non-distended, without hepatosplenomegaly. GENITOURINARY: Deferred. MUSCULOSKELETAL: No tenderness to palpation of the axial skeleton. There is no clubbing. No cyanosis. No edema of the lower extremities. SKIN OF BODY: No rash or jaundice. PSYCHIATRIC EVALUATION: Could not be assessed. HEMATOLOGIC/LYMPHATIC/ IMMUNOLOGIC: No palpable lymphadenopathy. NEUROLOGIC: Lethargic but able to answer simple questions. Groslly non-focal.    LABS:    Recent Labs     04/20/22 2023 04/22/22  0629   WBC 11.2* 6.2   RBC 4.64 4.24   HGB 13.2* 11.8*   HCT 42.0 37.4*   MCH 28.5 27.9   MCHC 31.5 31.7   RDW 17.3* 16.4*    175   MPV 8.1 7.7   NEUTOPHILPCT 83.8 70.2   MONOPCT 4.0 8.2   BASOPCT 0.3 0.3     Recent Labs     04/20/22 2023 04/22/22  0629    139   K 4.3 4.6    100   ANIONGAP 12 8   CO2 28 31   BUN 16 17   CREATININE 1.1 1.1   CALCIUM 9.4 9.4   PROT 6.8  --    BILITOT 0.5  --    ALKPHOS 71  --    ALT 15  --    AST 24  --    GLUCOSE 177* 124*     Recent Labs     04/22/22  0257 04/22/22  1317   PHART 7.335* 7.303*   DTA4YWQ 66.2* 72.6*   PO2ART 35.6* 85.1   COM9NAI 35.3* 35.9*   S4RHYSLC 68.1* 96.4   BEART 7.3* 7.2*       Recent Labs     04/20/22 2023   TROPONINI <0.01       IMAGING:  I reviewed the CT chest and my interpretation is as follows. Bilateral lower lobe infiltrates, more in left lower lobe. No PE noted. IMPRESSION:  Acute hypercapnic and hypoxic respiratory failure  Multifocal pneumonia      PLAN:  Patient was noted to have multifocal infiltrates in the lower lobes, left more than right. Differentials include community-acquired pneumonia, aspiration, COVID pneumonia. Initial COVID 19 PCR test is negative. Influenza negative. I would recommend to send repeat respiratory viral panel with COVID-19 PCR. Continue Rocephin and Zithromax for now.   Patient has acute hypercapnia and hypoxia on ABG. Start BiPAP 14/6 and titrate BiPAP settings with tidal volumes of 400-450 cc. Titrate FiO2 for saturation above 92%. Critical Care Time: 39 Minutes  Total critical care time caring for this patient with life threatening illness, including direct patient contact, management of life support systems, review of data including imaging and labs, discussions with other team members and physicians excluding procedures. Electronically signed by Anh Desir MD on 4/22/22 at 2:51 PM EDT     This note was completed using dragon medical speech recognition software. Grammatical errors, random word insertions, pronoun errors and incomplete sentences are occasional consequences of this technology due to software limitations. If there are questions or concerns about the content of this note of information contained within the body of this dictation, they should be addressed with the provider for clarification.

## 2022-04-23 LAB
ANION GAP SERPL CALCULATED.3IONS-SCNC: 10 MMOL/L (ref 3–16)
BUN BLDV-MCNC: 16 MG/DL (ref 7–20)
CALCIUM SERPL-MCNC: 9.2 MG/DL (ref 8.3–10.6)
CHLORIDE BLD-SCNC: 99 MMOL/L (ref 99–110)
CO2: 30 MMOL/L (ref 21–32)
CREAT SERPL-MCNC: 1 MG/DL (ref 0.8–1.3)
GFR AFRICAN AMERICAN: >60
GFR NON-AFRICAN AMERICAN: >60
GLUCOSE BLD-MCNC: 100 MG/DL (ref 70–99)
GLUCOSE BLD-MCNC: 111 MG/DL (ref 70–99)
GLUCOSE BLD-MCNC: 119 MG/DL (ref 70–99)
GLUCOSE BLD-MCNC: 136 MG/DL (ref 70–99)
GLUCOSE BLD-MCNC: 138 MG/DL (ref 70–99)
HCT VFR BLD CALC: 39.1 % (ref 40.5–52.5)
HEMOGLOBIN: 12.8 G/DL (ref 13.5–17.5)
MCH RBC QN AUTO: 28.5 PG (ref 26–34)
MCHC RBC AUTO-ENTMCNC: 32.6 G/DL (ref 31–36)
MCV RBC AUTO: 87.5 FL (ref 80–100)
PDW BLD-RTO: 16.4 % (ref 12.4–15.4)
PERFORMED ON: ABNORMAL
PLATELET # BLD: 155 K/UL (ref 135–450)
PMV BLD AUTO: 7.9 FL (ref 5–10.5)
POTASSIUM SERPL-SCNC: 4.7 MMOL/L (ref 3.5–5.1)
RBC # BLD: 4.47 M/UL (ref 4.2–5.9)
SODIUM BLD-SCNC: 139 MMOL/L (ref 136–145)
WBC # BLD: 5.6 K/UL (ref 4–11)

## 2022-04-23 PROCEDURE — 94640 AIRWAY INHALATION TREATMENT: CPT

## 2022-04-23 PROCEDURE — 2580000003 HC RX 258: Performed by: INTERNAL MEDICINE

## 2022-04-23 PROCEDURE — 97530 THERAPEUTIC ACTIVITIES: CPT

## 2022-04-23 PROCEDURE — 97165 OT EVAL LOW COMPLEX 30 MIN: CPT

## 2022-04-23 PROCEDURE — 99233 SBSQ HOSP IP/OBS HIGH 50: CPT | Performed by: INTERNAL MEDICINE

## 2022-04-23 PROCEDURE — 1200000000 HC SEMI PRIVATE

## 2022-04-23 PROCEDURE — 6360000002 HC RX W HCPCS: Performed by: INTERNAL MEDICINE

## 2022-04-23 PROCEDURE — 97535 SELF CARE MNGMENT TRAINING: CPT

## 2022-04-23 PROCEDURE — 6370000000 HC RX 637 (ALT 250 FOR IP): Performed by: NURSE PRACTITIONER

## 2022-04-23 PROCEDURE — 85027 COMPLETE CBC AUTOMATED: CPT

## 2022-04-23 PROCEDURE — 94761 N-INVAS EAR/PLS OXIMETRY MLT: CPT

## 2022-04-23 PROCEDURE — 36415 COLL VENOUS BLD VENIPUNCTURE: CPT

## 2022-04-23 PROCEDURE — 94660 CPAP INITIATION&MGMT: CPT

## 2022-04-23 PROCEDURE — 2700000000 HC OXYGEN THERAPY PER DAY

## 2022-04-23 PROCEDURE — 6370000000 HC RX 637 (ALT 250 FOR IP): Performed by: INTERNAL MEDICINE

## 2022-04-23 PROCEDURE — 80048 BASIC METABOLIC PNL TOTAL CA: CPT

## 2022-04-23 PROCEDURE — 97161 PT EVAL LOW COMPLEX 20 MIN: CPT

## 2022-04-23 RX ORDER — GABAPENTIN 400 MG/1
400 CAPSULE ORAL 4 TIMES DAILY
Status: DISCONTINUED | OUTPATIENT
Start: 2022-04-23 | End: 2022-04-27 | Stop reason: HOSPADM

## 2022-04-23 RX ADMIN — ALBUTEROL SULFATE 2.5 MG: 2.5 SOLUTION RESPIRATORY (INHALATION) at 19:10

## 2022-04-23 RX ADMIN — GABAPENTIN 600 MG: 300 CAPSULE ORAL at 09:14

## 2022-04-23 RX ADMIN — GABAPENTIN 400 MG: 400 CAPSULE ORAL at 20:28

## 2022-04-23 RX ADMIN — ENOXAPARIN SODIUM 40 MG: 100 INJECTION SUBCUTANEOUS at 09:15

## 2022-04-23 RX ADMIN — Medication 1000 MG: at 00:05

## 2022-04-23 RX ADMIN — GABAPENTIN 400 MG: 400 CAPSULE ORAL at 16:17

## 2022-04-23 RX ADMIN — PANTOPRAZOLE SODIUM 40 MG: 40 TABLET, DELAYED RELEASE ORAL at 16:17

## 2022-04-23 RX ADMIN — Medication 10 ML: at 20:29

## 2022-04-23 RX ADMIN — PANTOPRAZOLE SODIUM 40 MG: 40 TABLET, DELAYED RELEASE ORAL at 05:08

## 2022-04-23 RX ADMIN — DOXAZOSIN 4 MG: 4 TABLET ORAL at 09:14

## 2022-04-23 RX ADMIN — Medication 10 ML: at 09:15

## 2022-04-23 RX ADMIN — AMLODIPINE BESYLATE 5 MG: 5 TABLET ORAL at 09:15

## 2022-04-23 RX ADMIN — ALBUTEROL SULFATE 2.5 MG: 2.5 SOLUTION RESPIRATORY (INHALATION) at 10:27

## 2022-04-23 RX ADMIN — AZITHROMYCIN MONOHYDRATE 500 MG: 250 TABLET ORAL at 02:13

## 2022-04-23 ASSESSMENT — PAIN SCALES - GENERAL
PAINLEVEL_OUTOF10: 0

## 2022-04-23 NOTE — PROGRESS NOTES
Physical Therapy  Facility/Department: [facility] ICU  Physical Therapy Initial Assessment    Name: Zunilda Ley  : 1925  MRN: [id number]  Date of Service: 2022    Discharge Recommendations:  IP Rehab,Continue to assess pending progress,Patient would benefit from continued therapy after discharge   PT Equipment Recommendations  Equipment Needed: No (depending on discharge plan)     Zunilda Ley scored a 14/24 on the AM-PAC short mobility form. Current research shows that an AM-PAC score of 17 or less is typically not associated with a discharge to the patient's home setting. Based on the patient's AM-PAC score and their current functional mobility deficits, it is recommended that the patient have 5-7 sessions per week of Physical Therapy at d/c to increase the patient's independence. At this time, this patient demonstrates the endurance, and/or tolerance for 3 hours of therapy each day, with a treatment frequency of 5-7x/wk. Please see assessment section for further patient specific details. If patient discharges prior to next session this note will serve as a discharge summary. Please see below for the latest assessment towards goals. Patient Diagnosis(es): The primary encounter diagnosis was Hypoxia. A diagnosis of Pneumonia due to infectious organism, unspecified laterality, unspecified part of lung was also pertinent to this visit. Past Medical History:  has a past medical history of BPH, Elevated prostate specific antigen (PSA), Esophageal reflux, Fracture closed, humerus, HTN (hypertension), Hyperlipidemia, Osteoarthritis, Peripheral neuropathy, Peripheral neuropathy, and Type II or unspecified type diabetes mellitus without mention of complication, not stated as uncontrolled. Past Surgical History:  has a past surgical history that includes knee surgery (); Ankle surgery (); Tonsillectomy (); shoulder surgery (02/09/10); Colonoscopy (10-02);  Cataract removal (10-03); Total knee arthroplasty (4-20-07); Revision total knee arthroplasty (1-14-09); Revision total knee arthroplasty (2-23-09); and Total knee arthroplasty (5-06). Assessment   Body Structures, Functions, Activity Limitations Requiring Skilled Therapeutic Intervention: Decreased functional mobility ; Decreased cognition;Decreased balance;Decreased safe awareness  Assessment: 79 yo male admitted 4/20/22 with decreased O2 saturations, shortness of breath, and confusion. Pt with elevated ABGs during admission, requiring BiPap use and transfer to ICU. Pt has had intermittent confusion. Pt will benefit from con't skilled PT to facilitate transition home. Will recommend inpatient rehab for the pt at this time and can adjust recommendation pending pt's progress. Treatment Diagnosis: decreased functional mobility  Therapy Prognosis: Good  Decision Making: Low Complexity  History: as above  Exam: bed mobility, transfers, gait, balance  Clinical Presentation: stable  Barriers to Learning: cognition  Requires PT Follow-Up: Yes  Activity Tolerance  Activity Tolerance: Patient tolerated evaluation without incident     Plan   Plan  Plan: 3-5 times per week  Current Treatment Recommendations: Strengthening,Balance training,Functional mobility training,Transfer training,Endurance training,Neuromuscular re-education,Gait training,Patient/Caregiver education & training,Therapeutic activities,Equipment evaluation, education, & procurement  Safety Devices  Type of Devices:  All fall risk precautions in place,Call light within reach,Chair alarm in place,Gait belt,Left in chair,Nurse notified,Patient at risk for falls  Restraints  Restraints Initially in Place: No     Restrictions  Restrictions/Precautions  Restrictions/Precautions: Modified Diet,Fall Risk,Up as Tolerated  Required Braces or Orthoses?: No  Position Activity Restriction  Other position/activity restrictions: Patient is a 80 y.o. male with past medical history significant for BPH, GERD, hypertension, hyperlipidemia, diabetes mellitus type 2 who presented to hospital with complaints of malaise, chills and weakness with shortness of breath. Going on for few days. Patient was noted to be hypoxic in the ER. He was admitted for multifocal pneumonia. Started on IV antibiotics. He has been having some periods of lethargy and confusion. ABG was performed which was suggestive of acute hypercapnia. Subjective   General  Chart Reviewed: Yes  Patient assessed for rehabilitation services?: Yes  Additional Pertinent Hx: has a past medical history of BPH, Elevated prostate specific antigen (PSA), Esophageal reflux, Fracture closed, humerus, HTN (hypertension), Hyperlipidemia, Osteoarthritis, Peripheral neuropathy, Peripheral neuropathy, and Type II or unspecified type diabetes mellitus without mention of complication, not stated as uncontrolled. Past Surgical History: has a past surgical history that includes knee surgery (1998); Ankle surgery (2002); Tonsillectomy (1927); shoulder surgery (02/09/10); Colonoscopy (10-02); Cataract removal (10-03); Total knee arthroplasty (4-20-07); Revision total knee arthroplasty (1-14-09); Revision total knee arthroplasty (2-23-09); and Total knee arthroplasty (5-06). Family / Caregiver Present: No  Follows Commands: Within Functional Limits  General Comment  Comments: Pt is alert, but oriented x 1. Pt cannot remember where he is, what the date is, and why he was admitted to the hospital.  Subjective  Subjective: \"I feel great. \" Pt is agreeable to therapy assessment, very pleasant.          Social/Functional History  Social/Functional History  Lives With: Spouse  Type of Home: House (Independent living at [facility])  Home Layout: One level  Home Access: Level entry  Bathroom Shower/Tub: Walk-in shower  Bathroom Equipment: Grab bars in shower,Built-in shower seat,Hand-held shower  Home Equipment: lou Awan  Has the patient had two or more falls in the past year or any fall with injury in the past year?: No  Receives Help From: Home health  ADL Assistance: Independent  Homemaking Assistance: Needs assistance (has home assist in the morning for 4 hours - prepares breakfast and cleans up)  Homemaking Responsibilities: No  Ambulation Assistance: Independent  Transfer Assistance: Independent  Active : Yes  Occupation: Retired  Type of Occupation: finance for [employer]: watch TV, take walks  Additional Comments: Pt reports that his wife has gotten frail recently; pt has adult children who live locally  Vision/Hearing  Vision Exceptions: Wears glasses for distance  Hearing: Within functional limits    Cognition   Orientation  Orientation Level: Oriented to person;Disoriented to place; Disoriented to time;Disoriented to situation  Cognition  Overall Cognitive Status: Exceptions  Arousal/Alertness: Appropriate responses to stimuli  Following Commands: Follows one step commands consistently; Follows multistep commands with repitition  Attention Span: Appears intact  Memory: Decreased recall of biographical Information;Decreased recall of recent events;Decreased recall of precautions  Safety Judgement: Decreased awareness of need for safety  Problem Solving: Decreased awareness of errors  Insights: Decreased awareness of deficits  Initiation: Requires cues for some  Sequencing: Requires cues for some     Objective      Observation/Palpation  Posture: Fair  Scar: pt has B knee scars from previous surgeries  Gross Assessment  AROM: Within functional limits  Strength:  Within functional limits  Tone: Normal  Sensation: Intact (past medical history indicates he has peripheral neuropathy, but pt denies any numbness/tingling)        Strength RLE  Comment: hip flex 4/5, knee flex 4/5, knee ext 4/5, ankle DF 4+/5  Strength LLE  Comment: hip flex 4/5, knee flex 4/5, knee ext 4/5, ankle DF 4+/5        Bed Mobility Training  Bed Mobility Training: Yes  Bed mobility  Supine to Sit: Maximum assistance (HOB elevated; pt comments on difficulty moving around in hospital bed)  Scooting: Moderate assistance  Transfers  Sit to Stand: Minimal Assistance (from EOB and recliner to RW; pt did stand impulsively after session with SBA)  Stand to sit: Minimal Assistance  Bed to Chair: Contact guard assistance (short, shuffling steps from bed < recliner with RW)  Ambulation  Surface: level tile  Device: Rolling Walker  Other Apparatus: O2 (6L)  Assistance: Contact guard assistance  Gait Deviations: Shuffles; Slow Yasmeen;Decreased head and trunk rotation;Decreased step length;Decreased step height  Distance: 10' + 10'  Stairs/Curb  Stairs?: No     Balance  Posture: Fair  Sitting - Static: Good  Sitting - Dynamic: Fair;+  Standing - Static: Fair  Standing - Dynamic: Fair;-  Comments: pt slightly unsteady in standing - benefits from support with RW           AM-PAC Score  AM-PAC Inpatient Mobility Raw Score : 14 (04/23/22 0911)  AM-PAC Inpatient T-Scale Score : 38.1 (04/23/22 0911)  Mobility Inpatient CMS 0-100% Score: 61.29 (04/23/22 0911)  Mobility Inpatient CMS G-Code Modifier : CL (04/23/22 0911)          Goals  Short Term Goals  Time Frame for Short term goals: Discharge  Short term goal 1: Pt will perform bed mobility with SPV  Short term goal 2: Pt will perform transfers sit <> stand with SPV and LRAD  Short term goal 3: Pt will perform ambulation x 150' with SBA and LRAD  Patient Goals   Patient goals : Return home with spouse       Therapy Time   Individual Concurrent Group Co-treatment   Time In       0748   Time Out       0827   Minutes       39   Timed Code Treatment Minutes: [address], [place] [id number]

## 2022-04-23 NOTE — PROGRESS NOTES
[facility]    4/23/2022 10:06 AM        Name: Diana Dee . Admitted: 4/20/2022  Primary Care Provider: Odalys Almonte MD (Tel: [phone])      Chief Complaint   Patient presents with    Shortness of Breath     pt with tremors that started today in hands and feet after starting new med, pt also was 86% on RA. Placed on NRB and at 97%. [facility] em[facility] from home. Brief History: Patient is a 79 yo male with hx BPH, GERD, HTN, HLD, DM2. He presented to hospital with malaise, weakness, chills. Noted to be hypoxic in ER with saturation 86% on room air. CXR with multifocal infiltrates, CT pulmonary negative for PE but also shows multifocal pneumonia. He was admitted and started on IV antibiotics. Subjective:  Presently up inn bedside chair. He is much more alert today but confused, requires frequent reorientation. There were no acute overnight events, offers no new complaints. Denies chest pain, shortness of breath. No tremors.      Reviewed interval ancillary notes    Current Medications  doxazosin (CARDURA) tablet 4 mg, Daily  gabapentin (NEURONTIN) capsule 600 mg, 4x Daily  pantoprazole (PROTONIX) tablet 40 mg, BID AC  sodium chloride flush 0.9 % injection 5-40 mL, 2 times per day  sodium chloride flush 0.9 % injection 5-40 mL, PRN  0.9 % sodium chloride infusion, PRN  enoxaparin (LOVENOX) injection 40 mg, Daily  ondansetron (ZOFRAN-ODT) disintegrating tablet 4 mg, Q8H PRN   Or  ondansetron (ZOFRAN) injection 4 mg, Q6H PRN  polyethylene glycol (GLYCOLAX) packet 17 g, Daily PRN  acetaminophen (TYLENOL) tablet 650 mg, Q6H PRN   Or  acetaminophen (TYLENOL) suppository 650 mg, Q6H PRN  albuterol (PROVENTIL) nebulizer solution 2.5 mg, Q4H PRN  cefTRIAXone (ROCEPHIN) 1000 mg in sterile water 10 mL IV syringe, Q24H  glucose (GLUTOSE) 40 % oral gel 15 g, PRN  dextrose 50 % IV solution, PRN  glucagon (rDNA) injection 1 mg, PRN  dextrose 5 % solution, PRN  insulin lispro (HUMALOG) injection vial 0-12 Units, TID WC  insulin lispro (HUMALOG) injection vial 0-6 Units, Nightly  albuterol (PROVENTIL) nebulizer solution 2.5 mg, BID  amLODIPine (NORVASC) tablet 5 mg, Daily      Objective:  /82   Pulse 71   Temp 98.5 °F (36.9 °C) (Temporal)   Resp 23   Ht 5' 11\" (1.803 m)   Wt 253 lb (114.8 kg)   SpO2 95%   BMI 35.29 kg/m²     Intake/Output Summary (Last 24 hours) at 4/23/2022 1006  Last data filed at 4/23/2022 0900  Gross per 24 hour   Intake 120 ml   Output 1325 ml   Net -1205 ml      Wt Readings from Last 3 Encounters:   04/23/22 253 lb (114.8 kg)   04/23/18 210 lb (95.3 kg)   02/02/15 200 lb (90.7 kg)     General:  Awake, alert, in NAD, confused at times  Skin:  Warm and dry. Neck:  Supple. No JVD appreciated  Chest:  Normal effort. Clear to auscultation, no wheezes/rhonchi/rales  Cardiovascular:  RRR, normal S1/S2, no murmur/gallop/rub  Abdomen:  Soft, nontender, +bowel sounds  Extremities:  No edema  Neurological: No focal deficits  Psychological: Normal mood and affect      Labs and Tests:  CBC:   Recent Labs     04/20/22 2023 04/22/22  0629 04/23/22  0433   WBC 11.2* 6.2 5.6   HGB 13.2* 11.8* 12.8*    175 155     BMP:    Recent Labs     04/20/22 2023 04/22/22  0629 04/23/22  0433    139 139   K 4.3 4.6 4.7    100 99   CO2 28 31 30   BUN 16 17 16   CREATININE 1.1 1.1 1.0   GLUCOSE 177* 124* 100*     Hepatic:   Recent Labs     04/20/22 2023   AST 24   ALT 15   BILITOT 0.5   ALKPHOS 71     Results for Felipa Meigs (MRN [id number]) as of 4/23/2022 10:19   Ref.  Range 4/22/2022 07:44 4/22/2022 12:05 4/22/2022 17:04 4/22/2022 19:52 4/23/2022 07:34   POC Glucose Latest Ref Range: 70 - 99 mg/dl 130 (H) 110 (H) 117 (H) 158 (H) 119 (H)       CXR 4/21/2022:  Mild cardiomegaly and mild central pulmonary congestion with hazy left   perihilar opacity extending inferiorly which could be due to atypical   pulmonary edema and/or early pneumonia. CT Pulmonary 4/21/2022:  1. Limited exam, though no pulmonary emboli seen through the lobar branches. 2. Patchy consolidative airspace disease seen in the lingula and bilateral   lower lobes, left greater than right felt to represent multifocal pneumonia. No reactive effusion. 3. Cardiomegaly, with coronary artery atherosclerotic disease and minimal   aortic valvular calcification. 4. Mildly enlarged precarinal lymph node felt likely reactive.  No bulky   lymphadenopathy in the chest.   5. Multilevel degenerative changes are seen in the spine, with diffuse   osteopenia.  No acute fracture. Problem List  Principal Problem:    Multifocal pneumonia  Active Problems:    Type 2 diabetes mellitus (HCC)    Acute respiratory failure with hypoxia (HCC)    Esophageal reflux    HTN (hypertension)  Resolved Problems:    * No resolved hospital problems. *       Assessment & Plan:   1. Multifocal pneumonia. Presented with malaise and chills. CXR and CT scan with multifocal infiltrates. COVID-19 and influenza screen are negative. WBC 11.2->6.2, procal 0.31. He received IV ceftriaxone and levofloxacin in ER, started on ceftriaxone and azithromycin on admission. Blood cultures with NGTD. Continue IV ceftriaxone and bronchodilators. Speech therapy evaluated, suspect mild to moderate oropharyngeal dysphasia. Recommend MBS, pending. Respiratory panel (4/23) negative. Remains afebrile and no leukocytosis. 2. Acute hypoxic respiratory failure. Noted to have O2 sat 86% on room air and increased work of breathing. Secondary to multifocal pneumonia. O2 sats currently mid 90s on 4 liters, baseline O2 was nocturnal and prn use only. Treatment as above. 3. Somnolence. Improved. Suspect combination of sedation from Seroquel and hypercapnia. Improved with bipap, pCO2 66.2->72.6->56. 4. ammonia level 38.   4. DM2, controlled. A1c 6.5.  Being covered with medium dose correction. BG values controlled. 5. GERD. Continue PPI. 6. HTN. Controlled. Continue amlodipine, doxazosin. 7. Urinary retention / hematuria. Patient straight cathed for bladder scan 636 ml, escobar subsequently inserted. Suspect hematuria from [address] cath placement. He is on doxazosin. Urology consult pending. Update to daughter Klaus Bettencourt by phone      Diet: ADULT DIET; Dysphagia - Soft and Bite Sized;  No Drinking Straws  Code:Full Code  DVT PPX: enoxaparin      MURALI Holman - CNP   4/23/2022 10:06 AM

## 2022-04-23 NOTE — PROGRESS NOTES
Patient up in chair, alert but confused at times. States correct date and situation but states he would like to go home and shower. Patient educated that he is unable to go home at this time due to his oxygen demand and high CO2 levels and needs to remain in the hospital for further monitoring and treatment. Patient appears confused and later asks to shower at home to nurse. RN reoriented patient to situation and place. VSS at this time. SR on monitor. Patient able to be weaned to 4L of O2 at this time. Patient's son in the room visiting.

## 2022-04-23 NOTE — PROGRESS NOTES
PULMONARY AND CRITICAL CARE MEDICINE PROGRESS NOTE      Subjective: Patient sitting in chair reading newspaper. Currently on 4 L/min of oxygen supplementation saturating in mid to high 90s. Overnight was on BiPAP therapy for acute hypercapnic respiratory failure. ABG showing improved gas exchange. Patient wants to go back home. Remains pleasantly confused. 14 point ROS could not be obtained due to patient factors. Patient is pleasantly confused. Past Medical History:   Diagnosis Date    BPH     Elevated prostate specific antigen (PSA)     Esophageal reflux     Fracture closed, humerus 12-9-09    Shoulder    HTN (hypertension)     Hyperlipidemia     Osteoarthritis     Peripheral neuropathy     Peripheral neuropathy     Type II or unspecified type diabetes mellitus without mention of complication, not stated as uncontrolled      Past Surgical History:   Procedure Laterality Date    ANKLE SURGERY  2002    CATARACT REMOVAL  10-03    Right    COLONOSCOPY  10-02    Normal    KNEE SURGERY  1998    Arthroscopic Right    REVISION TOTAL KNEE ARTHROPLASTY  1-14-09    Infected knee prosthesis removed, space placed(Right knee)    REVISION TOTAL KNEE ARTHROPLASTY  2-23-09    Right knee spacer removed and new joint inserted    SHOULDER SURGERY  02/09/10    Total Shoulder arthroplasty    TONSILLECTOMY  1927    TOTAL KNEE ARTHROPLASTY  4-20-07    Right    TOTAL KNEE ARTHROPLASTY  5-06    Left     Social History     Socioeconomic History    Marital status:      Spouse name: Not on file    Number of children: Not on file    Years of education: Not on file    Highest education level: Not on file   Occupational History    Occupation: Retired   Tobacco Use    Smoking status: Never Smoker    Smokeless tobacco: Never Used   Substance and Sexual Activity    Alcohol use:  Yes    Drug use: No    Sexual activity: Not on file   Other Topics Concern    Not on file   Social History Narrative    Not on file     Social Determinants of Health     Financial Resource Strain:     Difficulty of Paying Living Expenses: Not on file   Food Insecurity:     Worried About Running Out of Food in the Last Year: Not on file    Bertrand of Food in the Last Year: Not on file   Transportation Needs:     Lack of Transportation (Medical): Not on file    Lack of Transportation (Non-Medical):  Not on file   Physical Activity:     Days of Exercise per Week: Not on file    Minutes of Exercise per Session: Not on file   Stress:     Feeling of Stress : Not on file   Social Connections:     Frequency of Communication with Friends and Family: Not on file    Frequency of Social Gatherings with Friends and Family: Not on file    Attends [employer] Services: Not on file    Active Member of Clubs or Organizations: Not on file    Attends Club or Organization Meetings: Not on file    Marital Status: Not on file   Intimate Partner Violence:     Fear of Current or Ex-Partner: Not on file    Emotionally Abused: Not on file    Physically Abused: Not on file    Sexually Abused: Not on file   Housing Stability:     Unable to Pay for Housing in the Last Year: Not on file    Number of [place] in the Last Year: Not on file    Unstable Housing in the Last Year: Not on file     Family History   Problem Relation Age of Onset    Elevated Lipids Mother     Cancer Father         lung cancer    Emphysema Father      Allergies   Allergen Reactions    Morphine Other (See Comments)     Confusion & agitation       MEDICATIONS:     Scheduled Meds:     doxazosin  4 mg Oral Daily    gabapentin  600 mg Oral 4x Daily    pantoprazole  40 mg Oral BID AC    sodium chloride flush  5-40 mL IntraVENous 2 times per day    enoxaparin  40 mg SubCUTAneous Daily    cefTRIAXone (ROCEPHIN) IV  1,000 mg IntraVENous Q24H    insulin lispro  0-12 Units SubCUTAneous TID WC    insulin lispro  0-6 Units SubCUTAneous Nightly    albuterol  2.5 mg Nebulization BID    amLODIPine  5 mg Oral Daily     Current Infusions:    sodium chloride      dextrose         PRN meds:  sodium chloride flush, sodium chloride, ondansetron **OR** ondansetron, polyethylene glycol, acetaminophen **OR** acetaminophen, albuterol, glucose, dextrose, glucagon (rDNA), dextrose    PHYSICAL EXAM:    /82   Pulse 71   Temp 98.5 °F (36.9 °C) (Temporal)   Resp 23   Ht 5' 11\" (1.803 m)   Wt 253 lb (114.8 kg)   SpO2 95%   BMI 35.29 kg/m²  I/O last 3 completed shifts: In: 5 [I.V.:5]  Out: 2125 [Urine:2125] I/O this shift:  In: 120 [P.O.:120]  Out: -       Intake/Output Summary (Last 24 hours) at 4/23/2022 1100  Last data filed at 4/23/2022 0900  Gross per 24 hour   Intake 120 ml   Output 1325 ml   Net -1205 ml         CONSTITUTIONAL: He is a 80y.o.-year-old who appears his stated age. He is in no acute distress. CARDIOVASCULAR: S1 S2 RRR. Without murmer  RESPIRATORY & CHEST: Lungs are clear to auscultation and percussion. No wheezing, no crackles. Good air movement  GASTROINTESTINAL & ABDOMEN: Soft, nontender, positive bowel sounds in all quadrants, non-distended, without hepatosplenomegaly. GENITOURINARY: Deferred. MUSCULOSKELETAL: No tenderness to palpation of the axial skeleton. There is no clubbing. No cyanosis. No edema of the lower extremities. SKIN OF BODY: No rash or jaundice. PSYCHIATRIC EVALUATION: Could not be assessed. HEMATOLOGIC/LYMPHATIC/ IMMUNOLOGIC: No palpable lymphadenopathy. NEUROLOGIC: Lethargic but able to answer simple questions. Groslly non-focal.    LABS:    Recent Labs     04/20/22 2023 04/22/22  0629 04/23/22  0433   WBC 11.2* 6.2 5.6   RBC 4.64 4.24 4.47   HGB 13.2* 11.8* 12.8*   HCT 42.0 37.4* 39.1*   MCH 28.5 27.9 28.5   MCHC 31.5 31.7 32.6   RDW 17.3* 16.4* 16.4*    175 155   MPV 8.1 7.7 7.9   NEUTOPHILPCT 83.8 70.2  --    MONOPCT 4.0 8.2  --    BASOPCT 0.3 0.3  --      Recent Labs     04/20/22 2023 04/22/22  0629 04/23/22  0433    139 139   K 4.3 4.6 4.7    100 99   ANIONGAP 12 8 10   CO2 28 31 30   BUN 16 17 16   CREATININE 1.1 1.1 1.0   CALCIUM 9.4 9.4 9.2   PROT 6.8  --   --    BILITOT 0.5  --   --    ALKPHOS 71  --   --    ALT 15  --   --    AST 24  --   --    GLUCOSE 177* 124* 100*     Recent Labs     04/22/22  0257 04/22/22  1317 04/22/22  1640   PHART 7.335* 7.303* 7.404   UPN8VIF 66.2* 72.6* 56.4*   PO2ART 35.6* 85.1 111.0*   MRC3NVA 35.3* 35.9* 35.3*   L4MNWACW 68.1* 96.4 98.9   BEART 7.3* 7.2* 8.7*       Recent Labs     04/20/22 2023   TROPONINI <0.01       IMAGING:  I reviewed the CT chest and my interpretation is as follows. Bilateral lower lobe infiltrates, more in left lower lobe. No PE noted. IMPRESSION:  Acute hypercapnic and hypoxic respiratory failure, improving  Multifocal pneumonia      PLAN:  Patient was noted to have multifocal infiltrates in the lower lobes, left more than right. Differentials include community-acquired pneumonia, aspiration, COVID pneumonia. Initial COVID 19 PCR test is negative. Influenza negative. Continue Rocephin and Zithromax for now. Leukocytosis downtrending. Patient remains afebrile. He became acutely hypercapnic yesterday requiring BiPAP support. This morning ABG shows improved gas exchange. Appears that patient may be a chronic CO2 retainer. I will advise to provide him with nocturnal BiPAP support at 14/6 cmH2O. During the day he can be on nasal cannula. Titrate oxygen flow to maintain SPO2 above 90%. Patient will benefit from short-term rehabilitation. He can continue to get nocturnal BiPAP support while he is undergoing rehabilitation and recovering from pneumonia. Should be able to move out of ICU by tomorrow. Will continue to follow.            Rafa Parr MD   Pulmonary Critical Care and Sleep Medicine  [address] [facility],[address]   [address] [place] [address] Dr Diaz, [address]  4/20/2022, 11:03 AM      This note was completed using dragon medical speech recognition software. Grammatical errors, random word insertions, pronoun errors and incomplete sentences are occasional consequences of this technology due to software limitations. If there are questions or concerns about the content of this note of information contained within the body of this dictation, they should be addressed with the provider for clarification.

## 2022-04-23 NOTE — CONSULTS
[facility] Consult Note  [facility]    Provider: Satinder Huffman MD MD Patient ID:  Admission Date: 2022 Name: Rossana Bobby Date: n/a MRN: [id number]   Patient Location: [id number] : 1925  Attending: Jenelle Crain MD Date of Service: 2022  PCP: Marie Briggs MD     Diagnoses:  1. Hypoxia    2. Pneumonia due to infectious organism, unspecified laterality, unspecified part of lung      AMS  Urinary retention  GH    Assessment/Plan:  81 yo M with multifocal PNA and hypercapnea. Found to be retaining urine, CIC x1 for 636, bladder scan 400 and escobar inserted. Now with some GH. Patient denies any past  hx although son said he has been seen for elevated PSAs in the past. Denies any baseline LUTS or LUTS prior to cath insertion     - continue cardura  - plan on  early AM  - allow for larger residuals given age and lack of symptoms   - GH should resolve with time and escobar removal    All the patients questions were answered in detail. He understands the plan as listed above. · Review/order of labs  · Review/order of radiology studies  · discussion with referring MD  Total time spent face-to-face with the patient 5 min, including greater than 50% of this time in discussion with the patient/family concerning the following:  · Recommended tests  · management options  · risks/benefits of management options  · importance of compliance  · Prognosis  · Risk factor reduction  · Patient/family education                                                                                                                                                      CC:   Chief Complaint   Patient presents with    Shortness of Breath     pt with tremors that started today in hands and feet after starting new med, pt also was 86% on RA. Placed on NRB and at 97%. [facility] ems from home. HPI: Glenis Lynch is a 80 y.o. male.  I saw the patient today for retention and [facility] after [place]    Past medical History:   He has a past medical history of BPH, Elevated prostate specific antigen (PSA), Esophageal reflux, Fracture closed, humerus (12-9-09), HTN (hypertension), Hyperlipidemia, Osteoarthritis, Peripheral neuropathy, Peripheral neuropathy, and Type II or unspecified type diabetes mellitus without mention of complication, not stated as uncontrolled. Past Surgical History:  He has a past surgical history that includes knee surgery (1998); Ankle surgery (2002); Tonsillectomy (1927); shoulder surgery (02/09/10); Colonoscopy (10-02); Cataract removal (10-03); Total knee arthroplasty (4-20-07); Revision total knee arthroplasty (1-14-09); Revision total knee arthroplasty (2-23-09); and Total knee arthroplasty (5-06). Allergies: Allergies   Allergen Reactions    Morphine Other (See Comments)     Confusion & agitation       Social History:  He reports that he has never smoked. He has never used smokeless tobacco. He reports current alcohol use. He reports that he does not use drugs. Family History:  family history includes Cancer in his father; Elevated Lipids in his mother; Emphysema in his father.     Medications:   Scheduled Meds:   doxazosin  4 mg Oral Daily    gabapentin  600 mg Oral 4x Daily    pantoprazole  40 mg Oral BID AC    sodium chloride flush  5-40 mL IntraVENous 2 times per day    enoxaparin  40 mg SubCUTAneous Daily    cefTRIAXone (ROCEPHIN) IV  1,000 mg IntraVENous Q24H    insulin lispro  0-12 Units SubCUTAneous TID WC    insulin lispro  0-6 Units SubCUTAneous Nightly    albuterol  2.5 mg Nebulization BID    amLODIPine  5 mg Oral Daily     Continuous Infusions:   sodium chloride      dextrose       PRN Meds:sodium chloride flush, sodium chloride, ondansetron **OR** ondansetron, polyethylene glycol, acetaminophen **OR** acetaminophen, albuterol, glucose, dextrose, glucagon (rDNA), dextrose    Review of Systems:  Constitutional: Negative for fever

## 2022-04-23 NOTE — PROGRESS NOTES
Occupational Therapy  Facility/Department: [facility] ICU  Occupational Therapy Initial Assessment    Name: Rosalva Watkins  : 1925  MRN: [id number]  Date of Service: 2022    Discharge Recommendations: Rosalva Watkins scored a 16/24 on the AM-PAC ADL Inpatient form. Current research shows that an AM-PAC score of 17 or less is typically not associated with a discharge to the patient's home setting. Based on the patient's AM-PAC score and their current ADL deficits, it is recommended that the patient have 5-7 sessions per week of Occupational Therapy at d/c to increase the patient's independence. At this time, this patient demonstrates the endurance, and/or tolerance for 3 hours of therapy each day, with a treatment frequency of 5-7x/wk. Please see assessment section for further patient specific details. If patient discharges prior to next session this note will serve as a discharge summary. Please see below for the latest assessment towards goals. OT Equipment Recommendations  Equipment Needed: No       Patient Diagnosis(es): The primary encounter diagnosis was Hypoxia. A diagnosis of Pneumonia due to infectious organism, unspecified laterality, unspecified part of lung was also pertinent to this visit. Past Medical History:  has a past medical history of BPH, Elevated prostate specific antigen (PSA), Esophageal reflux, Fracture closed, humerus, HTN (hypertension), Hyperlipidemia, Osteoarthritis, Peripheral neuropathy, Peripheral neuropathy, and Type II or unspecified type diabetes mellitus without mention of complication, not stated as uncontrolled. Past Surgical History:  has a past surgical history that includes knee surgery (); Ankle surgery (); Tonsillectomy (); shoulder surgery (02/09/10); Colonoscopy (10-02); Cataract removal (10-03); Total knee arthroplasty (07); Revision total knee arthroplasty (09);  Revision total knee arthroplasty (09); and Total knee arthroplasty (5-06). Treatment Diagnosis: Above deficits associated with hypoxia      Assessment   Performance deficits / Impairments: Decreased functional mobility ; Decreased endurance;Decreased ADL status; Decreased balance;Decreased strength;Decreased high-level IADLs  Assessment: Patient presents below baseline d/t above deficits, mainly limited d/t impaired balance and cognition this date. Contineud OT indicated in order to facilitate return to PLOF  Treatment Diagnosis: Above deficits associated with hypoxia  Prognosis: Good  Decision Making: Low Complexity  Exam: as above  REQUIRES OT FOLLOW-UP: Yes  Activity Tolerance  Activity Tolerance: Treatment limited secondary to decreased cognition;Patient Tolerated treatment well        Plan   Plan  Times per Week: 3-5  Times per Day: Daily  Current Treatment Recommendations: Strengthening,Balance training,Cognitive reorientation,Functional mobility training,Endurance training,Equipment evaluation, education, & procurement,Patient/Caregiver education & training,Safety education & training,Self-Care / ADL     Restrictions  Restrictions/Precautions  Restrictions/Precautions: Modified Diet,Fall Risk,Up as Tolerated  Required Braces or Orthoses?: No  Position Activity Restriction  Other position/activity restrictions: Patient is a 80 y.o. male with past medical history significant for BPH, GERD, hypertension, hyperlipidemia, diabetes mellitus type 2 who presented to hospital with complaints of malaise, chills and weakness with shortness of breath. Going on for few days. Patient was noted to be hypoxic in the ER. He was admitted for multifocal pneumonia. Started on IV antibiotics. He has been having some periods of lethargy and confusion. ABG was performed which was suggestive of acute hypercapnia.     Subjective   General  Chart Reviewed: Yes  Patient assessed for rehabilitation services?: Yes  Diagnosis: Hypoxia  Subjective  Subjective: Patient supine in bed upon assistance  Scooting: Moderate assistance  Transfers  Sit to stand: Contact guard assistance (x2; from EOB, recliner (CGA EOB, min A recliner) Pt does additional stand at end of session setting off alarm SBA)  Stand to sit: Contact guard assistance (x2; to recliner)     Cognition  Overall Cognitive Status: Exceptions  Arousal/Alertness: Appropriate responses to stimuli  Following Commands: Follows one step commands consistently; Follows multistep commands with repitition  Attention Span: Appears intact  Memory: Decreased recall of biographical Information;Decreased recall of recent events;Decreased recall of precautions  Safety Judgement: Decreased awareness of need for safety  Problem Solving: Decreased awareness of errors  Insights: Decreased awareness of deficits  Initiation: Requires cues for some  Sequencing: Requires cues for some  Perception  Overall Perceptual Status: WFL               Education Given To: Patient  Education Provided: Role of Therapy;Plan of Care  Barriers to Learning: Cognition  Education Outcome: Verbalized understanding;Continued education needed  LUE AROM (degrees)  LUE AROM : WFL  RUE AROM (degrees)  RUE AROM : WFL                     G-Code     OutComes Score                                                  AM-PAC Score        AM-[facility] Inpatient Daily Activity Raw Score: 16 (04/23/22 0833)  AM-PAC Inpatient ADL T-Scale Score : 35.96 (04/23/22 0833)  ADL Inpatient CMS 0-100% Score: 53.32 (04/23/22 1016)  ADL Inpatient CMS G-Code Modifier : CK (04/23/22 4754)    Goals  Short Term Goals  Time Frame for Short term goals: discharge  Short Term Goal 1: UB ADL set up  Short Term Goal 2: LB ADL min A  Short Term Goal 3: Fxl transfers SBA  Short Term Goal 4: Toileting SBA  Short Term Goal 5: Fxl mob SBA  Long Term Goals  Time Frame for Long term goals : LTG=STG       Therapy Time   Individual Concurrent Group Co-treatment   Time In       0747   Time Out       0825   Minutes       38      Timed Code Treatment Minutes:   23 minutes    Total Treatment Minutes:  38 minutes    Yrn Burt, [address] [place] [address] OTR/L [id number]    [place], OT

## 2022-04-24 LAB
ANION GAP SERPL CALCULATED.3IONS-SCNC: 10 MMOL/L (ref 3–16)
BUN BLDV-MCNC: 17 MG/DL (ref 7–20)
CALCIUM SERPL-MCNC: 9.2 MG/DL (ref 8.3–10.6)
CHLORIDE BLD-SCNC: 100 MMOL/L (ref 99–110)
CO2: 32 MMOL/L (ref 21–32)
CREAT SERPL-MCNC: 0.9 MG/DL (ref 0.8–1.3)
GFR AFRICAN AMERICAN: >60
GFR NON-AFRICAN AMERICAN: >60
GLUCOSE BLD-MCNC: 112 MG/DL (ref 70–99)
GLUCOSE BLD-MCNC: 113 MG/DL (ref 70–99)
GLUCOSE BLD-MCNC: 124 MG/DL (ref 70–99)
GLUCOSE BLD-MCNC: 131 MG/DL (ref 70–99)
GLUCOSE BLD-MCNC: 163 MG/DL (ref 70–99)
HCT VFR BLD CALC: 38.5 % (ref 40.5–52.5)
HEMOGLOBIN: 12.7 G/DL (ref 13.5–17.5)
MCH RBC QN AUTO: 28.6 PG (ref 26–34)
MCHC RBC AUTO-ENTMCNC: 33 G/DL (ref 31–36)
MCV RBC AUTO: 86.8 FL (ref 80–100)
PDW BLD-RTO: 16.2 % (ref 12.4–15.4)
PERFORMED ON: ABNORMAL
PLATELET # BLD: 162 K/UL (ref 135–450)
PMV BLD AUTO: 7.6 FL (ref 5–10.5)
POTASSIUM SERPL-SCNC: 4.3 MMOL/L (ref 3.5–5.1)
PROCALCITONIN: 0.07 NG/ML (ref 0–0.15)
RBC # BLD: 4.43 M/UL (ref 4.2–5.9)
SODIUM BLD-SCNC: 142 MMOL/L (ref 136–145)
WBC # BLD: 4.2 K/UL (ref 4–11)

## 2022-04-24 PROCEDURE — 6360000002 HC RX W HCPCS: Performed by: INTERNAL MEDICINE

## 2022-04-24 PROCEDURE — 6370000000 HC RX 637 (ALT 250 FOR IP): Performed by: INTERNAL MEDICINE

## 2022-04-24 PROCEDURE — 80048 BASIC METABOLIC PNL TOTAL CA: CPT

## 2022-04-24 PROCEDURE — 6370000000 HC RX 637 (ALT 250 FOR IP): Performed by: NURSE PRACTITIONER

## 2022-04-24 PROCEDURE — 94761 N-INVAS EAR/PLS OXIMETRY MLT: CPT

## 2022-04-24 PROCEDURE — 2580000003 HC RX 258: Performed by: INTERNAL MEDICINE

## 2022-04-24 PROCEDURE — 1200000000 HC SEMI PRIVATE

## 2022-04-24 PROCEDURE — 2700000000 HC OXYGEN THERAPY PER DAY

## 2022-04-24 PROCEDURE — 94640 AIRWAY INHALATION TREATMENT: CPT

## 2022-04-24 PROCEDURE — 84145 PROCALCITONIN (PCT): CPT

## 2022-04-24 PROCEDURE — 99233 SBSQ HOSP IP/OBS HIGH 50: CPT | Performed by: INTERNAL MEDICINE

## 2022-04-24 PROCEDURE — 85027 COMPLETE CBC AUTOMATED: CPT

## 2022-04-24 PROCEDURE — 36415 COLL VENOUS BLD VENIPUNCTURE: CPT

## 2022-04-24 RX ORDER — LACTOBACILLUS RHAMNOSUS GG 10B CELL
1 CAPSULE ORAL 2 TIMES DAILY WITH MEALS
Status: DISCONTINUED | OUTPATIENT
Start: 2022-04-24 | End: 2022-04-27 | Stop reason: HOSPADM

## 2022-04-24 RX ORDER — AZITHROMYCIN 250 MG/1
500 TABLET, FILM COATED ORAL DAILY
Status: DISCONTINUED | OUTPATIENT
Start: 2022-04-24 | End: 2022-04-27 | Stop reason: HOSPADM

## 2022-04-24 RX ADMIN — PANTOPRAZOLE SODIUM 40 MG: 40 TABLET, DELAYED RELEASE ORAL at 17:27

## 2022-04-24 RX ADMIN — AZITHROMYCIN MONOHYDRATE 500 MG: 250 TABLET ORAL at 17:27

## 2022-04-24 RX ADMIN — AMLODIPINE BESYLATE 5 MG: 5 TABLET ORAL at 08:45

## 2022-04-24 RX ADMIN — Medication 10 ML: at 20:40

## 2022-04-24 RX ADMIN — GABAPENTIN 400 MG: 400 CAPSULE ORAL at 08:45

## 2022-04-24 RX ADMIN — GABAPENTIN 400 MG: 400 CAPSULE ORAL at 17:27

## 2022-04-24 RX ADMIN — ENOXAPARIN SODIUM 40 MG: 100 INJECTION SUBCUTANEOUS at 08:45

## 2022-04-24 RX ADMIN — Medication 1 CAPSULE: at 17:27

## 2022-04-24 RX ADMIN — INSULIN LISPRO 1 UNITS: 100 INJECTION, SOLUTION INTRAVENOUS; SUBCUTANEOUS at 20:40

## 2022-04-24 RX ADMIN — Medication 5 ML: at 08:45

## 2022-04-24 RX ADMIN — DOXAZOSIN 4 MG: 4 TABLET ORAL at 08:45

## 2022-04-24 RX ADMIN — ALBUTEROL SULFATE 2.5 MG: 2.5 SOLUTION RESPIRATORY (INHALATION) at 09:11

## 2022-04-24 RX ADMIN — Medication 1000 MG: at 00:30

## 2022-04-24 RX ADMIN — PANTOPRAZOLE SODIUM 40 MG: 40 TABLET, DELAYED RELEASE ORAL at 08:45

## 2022-04-24 RX ADMIN — GABAPENTIN 400 MG: 400 CAPSULE ORAL at 20:40

## 2022-04-24 RX ADMIN — GABAPENTIN 400 MG: 400 CAPSULE ORAL at 12:25

## 2022-04-24 ASSESSMENT — PAIN SCALES - GENERAL
PAINLEVEL_OUTOF10: 0

## 2022-04-24 NOTE — PROGRESS NOTES
Urology Progress Note  [facility]    Provider: Susan Maya MD MD Patient ID:  Admission Date: 2022 Name: Ana Peer Date: 2022 MRN: [id number]   Patient Location: [facility]-[id number] : 1925  Attending: Paige Dubois MD Date of Service: 2022  PCP: Melida Hernandez MD     Diagnoses:  1. Hypoxia    2. Pneumonia due to infectious organism, unspecified laterality, unspecified part of lung       AMS  Urinary retention  GH       Assessment/Plan:  79 yo M with multifocal PNA and hypercapnea. Found to be retaining urine, CIC x1 for 636, bladder scan 400 and escobar inserted. Now with some GH. Patient denies any past  hx although son said he has been seen for elevated PSAs in the past. Denies any baseline LUTS or LUTS prior to cath insertion   Urine clearing this AM     - continue cardura  - plan on VT tomorrow AM- order in  - allow for larger residuals given age and lack of symptoms   - GH should resolve with time and escobar removal  - call with questions or if concern for PVR after first void         Subjective:   Cale England is a 80 y.o. male. He was seen and examined this morning.  Patient was sleeping     Objective:   Vitals:  Vitals:    22 0800   BP: (!) 140/92   Pulse: 75   Resp: 24   Temp: 99.2 °F (37.3 °C)   SpO2: 94%       Intake/Output Summary (Last 24 hours) at 2022 0835  Last data filed at 2022 0600  Gross per 24 hour   Intake 360 ml   Output 1550 ml   Net -1190 ml     Physical Exam:  Gen: NAD, sleeping  Escobar pink/red    Labs:  Lab Results   Component Value Date    WBC 4.2 2022    HGB 12.7 (L) 2022    HCT 38.5 (L) 2022    MCV 86.8 2022     2022     Lab Results   Component Value Date    CREATININE 0.9 2022    BUN 17 2022     2022    K 4.3 2022     2022    CO2 32 2022       Susan Maya MD MD  2022

## 2022-04-24 NOTE — PROGRESS NOTES
Patient resting comfortably in bed. Fernández removed as ordered. SR on monitor. VSS. Patient alert but orient to self and time only, reoriented to situation and place. Bed alarm engaged. Educated to call nurse using call light for assistance to use restroom. Call light left within reach. See flowsheets for further details.

## 2022-04-24 NOTE — PLAN OF CARE
identify coping skills, available support systems and cultural and spiritual values  2. Provide emotional support, including active listening and acknowledgement of concerns of patient and caregivers  3. Reduce environmental stimuli, as able  4. Instruct patient/family in relaxation techniques, as appropriate  5. Assess for spiritual pain/suffering and initiate Spiritual Care, Psychosocial Clinical Specialist consults as needed  Outcome: Not Progressing     Problem: Decision Making  Goal: Pt/Family able to effectively weigh alternatives and participate in decision making related to treatment and care  Description: INTERVENTIONS:  1. Determine when there are differences between patient's view, family's view, and healthcare provider's view of condition  2. Facilitate patient and family articulation of goals for care  3. Help patient and family identify pros/cons of alternative solutions  4. Provide information as requested by patient/family  5. Respect patient/family right to receive or not to receive information  6. Serve as a liaison between patient and family and health care team  7. Initiate Consults from Ethics, Palliative Care or initiate [address] as is appropriate  Outcome: Not Progressing     Problem: Behavior  Goal: Pt/Family maintain appropriate behavior and adhere to behavioral management agreement, if implemented  Description: INTERVENTIONS:  1. Assess patient/family's coping skills and  non-compliant behavior (including use of illegal substances)  2. Notify security of behavior or suspected illegal substances which indicate the need for search of the patient and/or belongings  3. Encourage verbalization of thoughts and concerns in a socially appropriate manner  4. Utilize positive, consistent limit setting strategies supporting safety of patient, staff and others  5. Encourage participation in the decision making process about the behavioral management agreement  6.  Implement a Health Care Agreement if patient meets criteria  7. If a patient's behavior jeopardizes the safety of the patient, staff, or others refer to organization policy. If a visitor's behavior poses a threat to safety call refer to organization policy.   8. Initiate consult with , Psychosocial CNS, Spiritual Care as appropriate  Outcome: Not Progressing

## 2022-04-24 NOTE — PROGRESS NOTES
Patient is 6 hours post-escobar removal and unable to urinate. Bladder scan performed and 333mL scanned.  Dr. Pari Madden notified via perfect serve and states \"We can rescan in 2 hours if he does not urinate if about 500 will straight cath\"

## 2022-04-24 NOTE — PROGRESS NOTES
[facility]    4/24/2022 9:49 AM        Name: Bhavesh Harrington . Admitted: 4/20/2022  Primary Care Provider: Daniel Arguello MD (Tel: [phone])      Chief Complaint   Patient presents with    Shortness of Breath     pt with tremors that started today in hands and feet after starting new med, pt also was 86% on RA. Placed on NRB and at 97%. [place] ems from home. Brief History: Patient is a 79 yo male with hx BPH, GERD, HTN, HLD, DM2. He presented to hospital with malaise, weakness, chills. Noted to be hypoxic in ER with saturation 86% on room air. CXR with multifocal infiltrates, CT pulmonary negative for PE but also shows multifocal pneumonia. He was admitted and started on IV antibiotics. Subjective:  Asleep, awakens easily. States he feels fine, offers no complaints. He is eager for DC. Patient alert and oriented today. Denies chest pain, shortness of breath, abdominal pain, nausea.      Reviewed interval ancillary notes    Current Medications  gabapentin (NEURONTIN) capsule 400 mg, 4x Daily  doxazosin (CARDURA) tablet 4 mg, Daily  pantoprazole (PROTONIX) tablet 40 mg, BID AC  sodium chloride flush 0.9 % injection 5-40 mL, 2 times per day  sodium chloride flush 0.9 % injection 5-40 mL, PRN  0.9 % sodium chloride infusion, PRN  enoxaparin (LOVENOX) injection 40 mg, Daily  ondansetron (ZOFRAN-ODT) disintegrating tablet 4 mg, Q8H PRN   Or  ondansetron (ZOFRAN) injection 4 mg, Q6H PRN  polyethylene glycol (GLYCOLAX) packet 17 g, Daily PRN  acetaminophen (TYLENOL) tablet 650 mg, Q6H PRN   Or  acetaminophen (TYLENOL) suppository 650 mg, Q6H PRN  albuterol (PROVENTIL) nebulizer solution 2.5 mg, Q4H PRN  cefTRIAXone (ROCEPHIN) 1000 mg in sterile water 10 mL IV syringe, Q24H  glucose (GLUTOSE) 40 % oral gel 15 g, PRN  dextrose 50 % IV solution, PRN  glucagon (rDNA) injection 1 mg, PRN  dextrose 5 % solution, PRN  insulin lispro (HUMALOG) injection vial 0-12 Units, TID WC  insulin lispro (HUMALOG) injection vial 0-6 Units, Nightly  albuterol (PROVENTIL) nebulizer solution 2.5 mg, BID  amLODIPine (NORVASC) tablet 5 mg, Daily      Objective:  BP (!) 140/92   Pulse 75   Temp 99.2 °F (37.3 °C) (Temporal)   Resp 24   Ht 5' 11\" (1.803 m)   Wt 222 lb 7.1 oz (100.9 kg)   SpO2 94%   BMI 31.02 kg/m²     Intake/Output Summary (Last 24 hours) at 4/24/2022 0949  Last data filed at 4/24/2022 0600  Gross per 24 hour   Intake 240 ml   Output 1550 ml   Net -1310 ml      Wt Readings from Last 3 Encounters:   04/24/22 222 lb 7.1 oz (100.9 kg)   04/23/18 210 lb (95.3 kg)   02/02/15 200 lb (90.7 kg)     General:  Asleep, awakens easily, alert, oriented in NAD  Skin:  Warm and dry. No unusual bruising or rash  Neck:  Supple. No JVD appreciated  Chest:  Normal effort. Clear to auscultation, no wheezes/rhonchi/rales  Cardiovascular:  RRR, normal S1/S2, no murmur/gallop/rub  Abdomen:  Soft, nontender, +bowel sounds  Extremities:  No edema  Neurological: No focal deficits  Psychological: Normal mood and affect      Labs and Tests:  CBC:   Recent Labs     04/22/22  0629 04/23/22 0433 04/24/22 0433   WBC 6.2 5.6 4.2   HGB 11.8* 12.8* 12.7*    155 162     BMP:    Recent Labs     04/22/22  0629 04/23/22  0433 04/24/22 0433    139 142   K 4.6 4.7 4.3    99 100   CO2 31 30 32   BUN 17 16 17   CREATININE 1.1 1.0 0.9   GLUCOSE 124* 100* 124*     Hepatic:   No results for input(s): AST, ALT, ALB, BILITOT, ALKPHOS in the last 72 hours. Results for Felipa Meigs (MRN [id number]) as of 4/24/2022 09:50   Ref.  Range 4/23/2022 07:34 4/23/2022 11:26 4/23/2022 16:01 4/23/2022 19:51 4/24/2022 08:31   POC Glucose Latest Ref Range: 70 - 99 mg/dl 119 (H) 136 (H) 111 (H) 138 (H) 113 (H)       CXR 4/21/2022:  Mild cardiomegaly and mild central pulmonary congestion with hazy left   perihilar opacity extending inferiorly which could be due to atypical   pulmonary edema and/or early pneumonia. CT Pulmonary 4/21/2022:  1. Limited exam, though no pulmonary emboli seen through the lobar branches. 2. Patchy consolidative airspace disease seen in the lingula and bilateral   lower lobes, left greater than right felt to represent multifocal pneumonia. No reactive effusion. 3. Cardiomegaly, with coronary artery atherosclerotic disease and minimal   aortic valvular calcification. 4. Mildly enlarged precarinal lymph node felt likely reactive.  No bulky   lymphadenopathy in the chest.   5. Multilevel degenerative changes are seen in the spine, with diffuse   osteopenia.  No acute fracture. Problem List  Principal Problem:    Multifocal pneumonia  Active Problems:    Type 2 diabetes mellitus (HCC)    Acute respiratory failure with hypoxia (HCC)    Esophageal reflux    HTN (hypertension)  Resolved Problems:    * No resolved hospital problems. *       Assessment & Plan:   1. Multifocal pneumonia. Presented with malaise and chills. CXR and CT scan with multifocal infiltrates. COVID-19 and influenza screen are negative. WBC 11.2->4.2, procal 0.31 on admit. He received IV ceftriaxone and levofloxacin in ER, started on ceftriaxone and po azithromycin on admission with plans to complete 7 day course. Blood cultures with NGTD. Continue bronchodilators. Speech therapy evaluated, suspect mild to moderate oropharyngeal dysphasia. Recommend MBS which is pending. Respiratory panel (4/23) negative. Remains afebrile and no leukocytosis. Pulmonary on board. 2. Acute hypoxic respiratory failure. Noted to have O2 sat 86% on room air and increased work of breathing. Secondary to multifocal pneumonia. O2 sats currently mid 90s on 4 liters, baseline O2 was nocturnal and prn use only. Treatment as above. 3. Somnolence. Resolved. Suspect combination of sedation from Seroquel and hypercapnia. Improved with bipap, pCO2 66.2->72.6->56. 4. ammonia level 38.   4. DM2, controlled. A1c 6.5. Being covered with medium dose correction. BG values controlled. 5. GERD. Continue PPI. 6. HTN. Controlled. Continue amlodipine, doxazosin. 7. Urinary retention / hematuria. Patient straight cathed for bladder scan 636 ml, escobar subsequently inserted. Suspect hematuria from [address] cath placement. He is on doxazosin. Urology on board, plan on voiding trial tomorrow morning. Recommend allowing for larger residuals given age and lack of symptoms. 8. Debility. Secondary to acute illness. Evaluated by PT/OT and recommend ARU on DC, consult placed for PMR. Okay to transfer from unit. Update to daughter Mario Smith by phone      Diet: ADULT DIET; Dysphagia - Soft and Bite Sized;  No Drinking Straws  Code:Full Code  DVT PPX: enoxaparin      MURALI West - CNP   4/24/2022 9:49 AM

## 2022-04-24 NOTE — PROGRESS NOTES
PULMONARY AND CRITICAL CARE MEDICINE PROGRESS NOTE      SUBJECTIVE: No acute events overnight. Patient is on 4 L nasal cannula and saturating well. Wore BiPAP for 5 hours yesterday. Denies any complaints. REVIEW OF SYSTEMS:  CONSTITUTIONAL SYMPTOMS: The patient denies fever, fatigue, night sweats, weight loss or weight gain. HEENT: No vision changes. No tinnitus, Denies sinus pain. No hoarseness, or dysphagia. CARDIOVASCULAR: Denies chest pain, palpitation, syncope. RESPIRATORY: Denies shortness of breath or cough. GASTROINTESTINAL: Denies nausea, abdominal pain or change in bowel function. SKIN: No rashes or itching. MUSCULOSKELETAL: Denies weakness or bone pain. NEUROLOGICAL: No headaches or seizures. MEDICATIONS:      gabapentin  400 mg Oral 4x Daily    doxazosin  4 mg Oral Daily    pantoprazole  40 mg Oral BID AC    sodium chloride flush  5-40 mL IntraVENous 2 times per day    enoxaparin  40 mg SubCUTAneous Daily    cefTRIAXone (ROCEPHIN) IV  1,000 mg IntraVENous Q24H    insulin lispro  0-12 Units SubCUTAneous TID WC    insulin lispro  0-6 Units SubCUTAneous Nightly    albuterol  2.5 mg Nebulization BID    amLODIPine  5 mg Oral Daily      sodium chloride      dextrose       sodium chloride flush, sodium chloride, ondansetron **OR** ondansetron, polyethylene glycol, acetaminophen **OR** acetaminophen, albuterol, glucose, dextrose, glucagon (rDNA), dextrose     ALLERGIES:   Allergies as of 04/20/2022 - Fully Reviewed 04/20/2022   Allergen Reaction Noted    Morphine          OBJECTIVE:   height is 5' 11\" (1.803 m) and weight is 222 lb 7.1 oz (100.9 kg). His temporal temperature is 99.2 °F (37.3 °C). His blood pressure is 125/60 and his pulse is 74. His respiration is 11 and oxygen saturation is 94%. I/O this shift:  In: 240 [P.O.:240]  Out: -      PHYSICAL EXAM:  CONSTITUTIONAL: He is a 80y.o.-year-old who appears his stated age.  He is alert and oriented x 3 and in no acute distress. CARDIOVASCULAR: S1 S2 RRR. Without murmer  RESPIRATORY & CHEST: Lungs are clear to auscultation and percussion. No wheezing, no crackles. Good air movement  GASTROINTESTINAL & ABDOMEN: Soft, nontender, positive bowel sounds in all quadrants, non-distended, without hepatosplenomegaly. GENITOURINARY: Deferred. MUSCULOSKELETAL: No tenderness to palpation of the axial skeleton. There is no clubbing. No cyanosis. No edema of the lower extremities. SKIN OF BODY: No rash or jaundice. PSYCHIATRIC EVALUATION: Normal affect. Patient answers questions appropriately. HEMATOLOGIC/LYMPHATIC/ IMMUNOLOGIC: No palpable lymphadenopathy. NEUROLOGIC: Alert and oriented x 3. Groslly non-focal. Motor strength is 5+/5 in all muscle groups. The patient has a normal sensorium globally. LABS:   Lab Results   Component Value Date    WBC 4.2 04/24/2022    HGB 12.7 (L) 04/24/2022    HCT 38.5 (L) 04/24/2022     04/24/2022    CHOL 116 11/24/2010    TRIG 78 11/24/2010    HDL 37 (L) 11/24/2010    ALT 15 04/20/2022    AST 24 04/20/2022     04/24/2022    K 4.3 04/24/2022     04/24/2022    CREATININE 0.9 04/24/2022    BUN 17 04/24/2022    CO2 32 04/24/2022       Lab Results   Component Value Date    GLUCOSE 124 (H) 04/24/2022    CALCIUM 9.2 04/24/2022     04/24/2022    K 4.3 04/24/2022    CO2 32 04/24/2022     04/24/2022    BUN 17 04/24/2022    CREATININE 0.9 04/24/2022       Lab Results   Component Value Date    GLUCOSE 124 (H) 04/24/2022    CALCIUM 9.2 04/24/2022     04/24/2022    K 4.3 04/24/2022    CO2 32 04/24/2022     04/24/2022    BUN 17 04/24/2022    CREATININE 0.9 04/24/2022     Recent Labs     04/22/22  0257 04/22/22  1317 04/22/22  1640   PHART 7.335* 7.303* 7.404   OAU0DOU 66.2* 72.6* 56.4*   PO2ART 35.6* 85.1 111.0*   EJJ8ISO 35.3* 35.9* 35.3*   [id number] 68.1* 96.4 98.9   BEART 7.3* 7.2* 8.7*       IMAGING:  I reviewed the CT chest and my interpretation is as follows. Bilateral lower lobe infiltrates, more in left lower lobe. No PE noted.        IMPRESSION:  Acute hypercapnic and hypoxic respiratory failure, improving  Multifocal pneumonia        PLAN:  Patient was noted to have multifocal infiltrates in the lower lobes, left more than right. Likely aspiration pneumonia. COVID 19 PCR test is negative. Influenza negative. Respiratory [place] panel negative. Continue Rocephin and Zithromax for now. Leukocytosis downtrending. Patient remains afebrile. Total antibiotics for 7 days. He became acutely hypercapnic yesterday requiring BiPAP support. He does not have history of COPD and is a lifelong non-smoker. Likely will not need BiPAP upon discharge. I would recommend to continue BiPAP while patient is in the hospital recovering from pneumonia. Patient can be downgraded and transferred out of ICU. Pulmonary will sign off. Vinicius Thorpe MD  Pulmonary Critical Care and Sleep Medicine  4/24/2022, 10:57 AM    This note was completed using dragon medical speech recognition software. Grammatical errors, random word insertions, pronoun errors and incomplete sentences are occasional consequences of this technology due to software limitations. If there are questions or concerns about the content of this note of information contained within the body of this dictation they should be addressed with the provider for clarification.

## 2022-04-25 ENCOUNTER — APPOINTMENT (OUTPATIENT)
Dept: GENERAL RADIOLOGY | Age: 87
DRG: 177 | End: 2022-04-25
Payer: MEDICARE

## 2022-04-25 LAB
ANION GAP SERPL CALCULATED.3IONS-SCNC: 8 MMOL/L (ref 3–16)
BLOOD CULTURE, ROUTINE: NORMAL
BUN BLDV-MCNC: 18 MG/DL (ref 7–20)
CALCIUM SERPL-MCNC: 9.3 MG/DL (ref 8.3–10.6)
CHLORIDE BLD-SCNC: 99 MMOL/L (ref 99–110)
CO2: 33 MMOL/L (ref 21–32)
CREAT SERPL-MCNC: 0.9 MG/DL (ref 0.8–1.3)
CULTURE, BLOOD 2: NORMAL
GFR AFRICAN AMERICAN: >60
GFR NON-AFRICAN AMERICAN: >60
GLUCOSE BLD-MCNC: 114 MG/DL (ref 70–99)
GLUCOSE BLD-MCNC: 117 MG/DL (ref 70–99)
GLUCOSE BLD-MCNC: 120 MG/DL (ref 70–99)
GLUCOSE BLD-MCNC: 137 MG/DL (ref 70–99)
GLUCOSE BLD-MCNC: 158 MG/DL (ref 70–99)
HCT VFR BLD CALC: 39.5 % (ref 40.5–52.5)
HEMOGLOBIN: 13 G/DL (ref 13.5–17.5)
MCH RBC QN AUTO: 28.3 PG (ref 26–34)
MCHC RBC AUTO-ENTMCNC: 32.9 G/DL (ref 31–36)
MCV RBC AUTO: 86 FL (ref 80–100)
PDW BLD-RTO: 15.7 % (ref 12.4–15.4)
PERFORMED ON: ABNORMAL
PLATELET # BLD: 175 K/UL (ref 135–450)
PMV BLD AUTO: 7.7 FL (ref 5–10.5)
POTASSIUM SERPL-SCNC: 4 MMOL/L (ref 3.5–5.1)
RBC # BLD: 4.59 M/UL (ref 4.2–5.9)
SODIUM BLD-SCNC: 140 MMOL/L (ref 136–145)
WBC # BLD: 3.9 K/UL (ref 4–11)

## 2022-04-25 PROCEDURE — 85027 COMPLETE CBC AUTOMATED: CPT

## 2022-04-25 PROCEDURE — 6370000000 HC RX 637 (ALT 250 FOR IP): Performed by: NURSE PRACTITIONER

## 2022-04-25 PROCEDURE — 97535 SELF CARE MNGMENT TRAINING: CPT

## 2022-04-25 PROCEDURE — 6360000002 HC RX W HCPCS: Performed by: INTERNAL MEDICINE

## 2022-04-25 PROCEDURE — 2580000003 HC RX 258: Performed by: INTERNAL MEDICINE

## 2022-04-25 PROCEDURE — 6370000000 HC RX 637 (ALT 250 FOR IP): Performed by: INTERNAL MEDICINE

## 2022-04-25 PROCEDURE — 74230 X-RAY XM SWLNG FUNCJ C+: CPT

## 2022-04-25 PROCEDURE — 92526 ORAL FUNCTION THERAPY: CPT

## 2022-04-25 PROCEDURE — 92611 MOTION FLUOROSCOPY/SWALLOW: CPT

## 2022-04-25 PROCEDURE — 2700000000 HC OXYGEN THERAPY PER DAY

## 2022-04-25 PROCEDURE — 80048 BASIC METABOLIC PNL TOTAL CA: CPT

## 2022-04-25 PROCEDURE — 6370000000 HC RX 637 (ALT 250 FOR IP): Performed by: UROLOGY

## 2022-04-25 PROCEDURE — 1200000000 HC SEMI PRIVATE

## 2022-04-25 PROCEDURE — 94761 N-INVAS EAR/PLS OXIMETRY MLT: CPT

## 2022-04-25 RX ORDER — FINASTERIDE 5 MG/1
5 TABLET, FILM COATED ORAL DAILY
Status: DISCONTINUED | OUTPATIENT
Start: 2022-04-25 | End: 2022-04-27 | Stop reason: HOSPADM

## 2022-04-25 RX ORDER — LIDOCAINE HYDROCHLORIDE 20 MG/ML
JELLY TOPICAL PRN
Status: DISCONTINUED | OUTPATIENT
Start: 2022-04-25 | End: 2022-04-27 | Stop reason: HOSPADM

## 2022-04-25 RX ADMIN — PANTOPRAZOLE SODIUM 40 MG: 40 TABLET, DELAYED RELEASE ORAL at 18:01

## 2022-04-25 RX ADMIN — Medication 1000 MG: at 00:33

## 2022-04-25 RX ADMIN — GABAPENTIN 400 MG: 400 CAPSULE ORAL at 18:01

## 2022-04-25 RX ADMIN — Medication 10 ML: at 09:28

## 2022-04-25 RX ADMIN — Medication 10 ML: at 21:27

## 2022-04-25 RX ADMIN — INSULIN LISPRO 1 UNITS: 100 INJECTION, SOLUTION INTRAVENOUS; SUBCUTANEOUS at 21:22

## 2022-04-25 RX ADMIN — GABAPENTIN 400 MG: 400 CAPSULE ORAL at 21:22

## 2022-04-25 RX ADMIN — GABAPENTIN 400 MG: 400 CAPSULE ORAL at 12:43

## 2022-04-25 RX ADMIN — Medication 1 CAPSULE: at 09:20

## 2022-04-25 RX ADMIN — LIDOCAINE HYDROCHLORIDE: 20 JELLY TOPICAL at 12:18

## 2022-04-25 RX ADMIN — PANTOPRAZOLE SODIUM 40 MG: 40 TABLET, DELAYED RELEASE ORAL at 05:19

## 2022-04-25 RX ADMIN — Medication 1 CAPSULE: at 18:01

## 2022-04-25 RX ADMIN — GABAPENTIN 400 MG: 400 CAPSULE ORAL at 09:20

## 2022-04-25 RX ADMIN — DOXAZOSIN 4 MG: 4 TABLET ORAL at 09:20

## 2022-04-25 RX ADMIN — AMLODIPINE BESYLATE 5 MG: 5 TABLET ORAL at 09:20

## 2022-04-25 RX ADMIN — ENOXAPARIN SODIUM 40 MG: 100 INJECTION SUBCUTANEOUS at 09:20

## 2022-04-25 RX ADMIN — FINASTERIDE 5 MG: 5 TABLET, FILM COATED ORAL at 12:43

## 2022-04-25 RX ADMIN — AZITHROMYCIN MONOHYDRATE 500 MG: 250 TABLET ORAL at 09:20

## 2022-04-25 RX ADMIN — Medication 1000 MG: at 23:30

## 2022-04-25 ASSESSMENT — PAIN SCALES - GENERAL
PAINLEVEL_OUTOF10: 0
PAINLEVEL_OUTOF10: 0

## 2022-04-25 NOTE — PROGRESS NOTES
Urology Progress Note  [facility]    Provider: MURALI Youssef CNP, MD Patient ID:  Admission Date: 2022 Name: Jorge Maher Date: 2022 MRN: [id number]   Patient Location: [id number] : 1925  Attending: Jose D Fulton MD Date of Service: 2022  PCP: Andrea Taylor MD     Diagnoses:  1. Hypoxia    2. Pneumonia due to infectious organism, unspecified laterality, unspecified part of lung       AMS  Urinary retention  GH       Assessment/Plan:  79 yo M with multifocal PNA and hypercapnea. Found to be retaining urine, CIC x1 for 636, bladder scan 400 and escobar inserted. Now with some GH. Patient denies any past  hx although son said he has been seen for elevated PSAs in the past. Denies any baseline LUTS or LUTS prior to cath insertion   Urine clearing this AM    Paraphimosis noted this am. Lidocaine jet applied to glans and foreskin. The glans was easily retracted back into foreskin.     - continue cardura  - VT- Voiding spontaneously. PVR 400cc's. Permit elevated residuals up to 500 cc's. Continue to bladder scan prn. Straight cath if PVR >500cc's. - Urine is clear yellow with external catheter  - Try to avoid escobar, straight cath prn, call with questions or if concern for PVR   - Urology will follow, call with any questions         Subjective:   Ekaterina Stanford is a 80 y.o. male. He was seen and examined this morning.  Patient was sleeping     Objective:   Vitals:  Vitals:    22 0915   BP: (!) 124/58   Pulse:    Resp:    Temp:    SpO2:        Intake/Output Summary (Last 24 hours) at 2022 1006  Last data filed at 2022 0600  Gross per 24 hour   Intake --   Output 950 ml   Net -950 ml     Physical Exam:  Gen: NAD, sleeping  Escobar pink/red    Labs:  Lab Results   Component Value Date    WBC 3.9 (L) 2022    HGB 13.0 (L) 2022    HCT 39.5 (L) 2022    MCV 86.0 2022     2022     Lab Results   Component Value Date CREATININE 0.9 04/25/2022    BUN 18 04/25/2022     04/25/2022    K 4.0 04/25/2022    CL 99 04/25/2022    CO2 33 (H) 04/25/2022       Meg Haines, APRN - CNP MD  4/25/2022

## 2022-04-25 NOTE — PROGRESS NOTES
[facility] HOSPITALISTS PROGRESS NOTE    4/25/2022 9:53 AM        Name: Carlos Alberto Schwartz . Admitted: 4/20/2022  Primary Care Provider: Tim Roper MD (Tel: [phone])      Chief Complaint   Patient presents with    Shortness of Breath     pt with tremors that started today in hands and feet after starting new med, pt also was 86% on RA. Placed on NRB and at 97%. Bruce ems from home. Hospital Course: Patient is a 81 yo male with hx BPH, GERD, HTN, HLD, DM2. He presented to hospital with malaise, weakness, chills. Noted to be hypoxic in ER with saturation 86% on room air. CXR with multifocal infiltrates, CT pulmonary negative for PE but also shows multifocal pneumonia. He was admitted and started on IV antibiotics. Subsequently transferred to unit secondary to somnolence and hypercapnia (72). He was treated with bipap, seroquel believed to be contributing factor. He is back to baseline. He had escobar inserted for urinary retention, removed 4/24. PT/OT evaluated and recommend ARU, PMR consult placed. Case management initiating precert for SNF as not likely to have diagnosis for ARU. Subjective:  Up in room returning from bathroom, gait steady with walker. There were no acute overnight events,offersnocomplaints. O2 currently at 4 liters, baseline believed to be 2 liters prn. Refused bipap overnight. Remains alert and oriented. Denies chest pain, shortness of breath, abdominal pain, nausea, diarrhea. Escobar removed yesterday, has been urinating.      Reviewed interval ancillary notes    Current Medications  lidocaine (XYLOCAINE) 2 % uro-jet, PRN  lactobacillus (CULTURELLE) capsule 1 capsule, BID WC  azithromycin (ZITHROMAX) tablet 500 mg, Daily  gabapentin (NEURONTIN) capsule 400 mg, 4x Daily  doxazosin (CARDURA) tablet 4 mg, Daily  pantoprazole (PROTONIX) tablet 40 mg, BID AC  sodium chloride flush 0.9 % injection 5-40 mL, 2 times per day  sodium chloride flush 0.9 % injection 5-40 mL, PRN  0.9 % sodium chloride infusion, PRN  enoxaparin (LOVENOX) injection 40 mg, Daily  ondansetron (ZOFRAN-ODT) disintegrating tablet 4 mg, Q8H PRN   Or  ondansetron (ZOFRAN) injection 4 mg, Q6H PRN  polyethylene glycol (GLYCOLAX) packet 17 g, Daily PRN  acetaminophen (TYLENOL) tablet 650 mg, Q6H PRN   Or  acetaminophen (TYLENOL) suppository 650 mg, Q6H PRN  albuterol (PROVENTIL) nebulizer solution 2.5 mg, Q4H PRN  cefTRIAXone (ROCEPHIN) 1000 mg in sterile water 10 mL IV syringe, Q24H  glucose (GLUTOSE) 40 % oral gel 15 g, PRN  dextrose 50 % IV solution, PRN  glucagon (rDNA) injection 1 mg, PRN  dextrose 5 % solution, PRN  insulin lispro (HUMALOG) injection vial 0-12 Units, TID WC  insulin lispro (HUMALOG) injection vial 0-6 Units, Nightly  amLODIPine (NORVASC) tablet 5 mg, Daily      Objective:  BP (!) 124/58   Pulse 64   Temp 97.2 °F (36.2 °C)   Resp 15   Ht 5' 11\" (1.803 m)   Wt 223 lb (101.2 kg)   SpO2 96%   BMI 31.10 kg/m²     Intake/Output Summary (Last 24 hours) at 4/25/2022 0953  Last data filed at 4/25/2022 0600  Gross per 24 hour   Intake --   Output 950 ml   Net -950 ml      Wt Readings from Last 3 Encounters:   04/25/22 223 lb (101.2 kg)   04/23/18 210 lb (95.3 kg)   02/02/15 200 lb (90.7 kg)     General:  Awake, alert, oriented in NAD  Skin:  Warm and dry. No unusual bruising or rash  Neck:  Supple. No JVD appreciated  Chest:  Normal effort.   Clear to auscultation, no wheezes/rhonchi/rales  Cardiovascular:  RRR, normal S1/S2, no murmur/gallop/rub  Abdomen:  Soft, nontender, +bowel sounds  Extremities:  No edema  Neurological: No focal deficits  Psychological: Normal mood and affect      Labs and Tests:  CBC:   Recent Labs     04/23/22  0433 04/24/22  0433 04/25/22  0418   WBC 5.6 4.2 3.9*   HGB 12.8* 12.7* 13.0*    162 175     BMP:    Recent Labs     04/23/22  0433 04/24/22  0433 04/25/22  0418    142 140   K 4.7 4.3 4.0   CL 99 100 99   CO2 30 32 33*   BUN 16 17 18   CREATININE 1.0 0.9 0.9   GLUCOSE 100* 124* 117*     Hepatic:   No results for input(s): AST, ALT, ALB, BILITOT, ALKPHOS in the last 72 hours. Results for Kodi Mccormick (MRN [id number]) as of 4/25/2022 10:04   Ref. Range 4/24/2022 08:31 4/24/2022 12:15 4/24/2022 16:21 4/24/2022 19:41 4/25/2022 08:26   POC Glucose Latest Ref Range: 70 - 99 mg/dl 113 (H) 112 (H) 131 (H) 163 (H) 120 (H)       CXR 4/21/2022:  Mild cardiomegaly and mild central pulmonary congestion with hazy left   perihilar opacity extending inferiorly which could be due to atypical   pulmonary edema and/or early pneumonia. CT Pulmonary 4/21/2022:  1. Limited exam, though no pulmonary emboli seen through the lobar branches. 2. Patchy consolidative airspace disease seen in the lingula and bilateral   lower lobes, left greater than right felt to represent multifocal pneumonia. No reactive effusion. 3. Cardiomegaly, with coronary artery atherosclerotic disease and minimal   aortic valvular calcification. 4. Mildly enlarged precarinal lymph node felt likely reactive.  No bulky   lymphadenopathy in the chest.   5. Multilevel degenerative changes are seen in the spine, with diffuse   osteopenia.  No acute fracture. Problem List  Principal Problem:    Multifocal pneumonia  Active Problems:    Type 2 diabetes mellitus (HCC)    Acute respiratory failure with hypoxia (HCC)    Esophageal reflux    HTN (hypertension)  Resolved Problems:    * No resolved hospital problems. *       Assessment & Plan:   1. Multifocal pneumonia. Presented with malaise and chills. CXR and CT scan with multifocal infiltrates. COVID-19 and influenza screen are negative. WBC 11.2->3.9, procal 0.31 on admit. He received IV ceftriaxone and levofloxacin in ER, started on ceftriaxone and po azithromycin on admission with plans to complete 7 day course. Blood cultures with NGTD.  Continue bronchodilators. Speech therapy evaluated, suspect mild to moderate oropharyngeal dysphasia. MBS pending. Respiratory panel (4/23) negative. Remains afebrile and no leukocytosis. Pulmonary has signed off.       2. Acute on chronic hypoxic respiratory failure. Noted to have O2 sat 86% on room air and increased work of breathing. Secondary to multifocal pneumonia and noncompliance with O2. O2 sats currently mid 90s on 4 liters, baseline O2 believed to be 2 liters. Treatment as above. 3. Somnolence / hypercapnia. Resolved. Suspect secondary to sedation from Seroquel. Improved with bipap, pCO2 66.2->72.6->56. 4. ammonia level 38. Pulmonary does not suspect he will need bipap upon discharge. Patient refused bipap last night. 4. DM2, controlled. A1c 6.5. Being covered with medium dose correction. BG values controlled. 5. GERD. Continue PPI. 6. HTN. Controlled. Continue amlodipine, doxazosin. 7. Urinary retention / hematuria. Patient straight cathed for bladder scan 636 ml, escobar subsequently inserted. Suspect hematuria from [address] cath placement. He is on doxazosin. Escobar removed 4/25, patient has been voiding. Urology on board, recommend allowing for larger residuals given age and lack of symptoms, straight cath for PVR > 500 mls. .     8. Debility. Secondary to acute illness. Evaluated by PT/OT (14/24, 16/24) and recommend ARU on DC, consult placed for PMR. Okay to transfer from unit. Disposition: Anticipate DC to SNF,  working on [facility]. Addendum 4/25/2022 3 pm. Received [facility] [employer] from nursing that patient is refusing SNF and plans to return home on Dc. I spoke with son Nidhi Mock by phone and reviewed PT/OT recommendations. He is agreeable to SNF and says he, brother and sister will be in and talk with patient. Addendum 4/25/2022 8 PM Received text from daughter Ammon Krishnamurthy ([phone]) [facility].  Will notify case management in am.      Diet: ADULT DIET; Dysphagia - Soft and Bite Sized;  No Drinking Straws  Code:Full Code  DVT PPX: enoxaparin      MURALI Joshi CNP   4/25/2022 9:53 AM

## 2022-04-25 NOTE — PROCEDURES
Facility/Department: [facility] ICU  MODIFIED BARIUM SWALLOW EVALUATION    Patient: Antoine Diana   : 1925   MRN: [id number]      Evaluation Date: 2022   Admitting Diagnosis: Hypoxia [R09.02]  Pneumonia due to infectious organism, unspecified laterality, unspecified part of lung [J18.9]  Multifocal pneumonia [J18.9]  Treatment Diagnosis: Dysphagia   Pain: denies                          Ordering MD: Susan Smith APRN-CNP    Radiologist: Dr. Eduardo Oro   Date of Evaluation: 2022  Type of Study: Modified Barium Swallowing Study (MBS)  Diet Prior to Study:  Dysphagia III Soft and Bite-Sized with thin liquids, meds with puree     Impression:  Modified Barium Swallow evaluation completed on 2022. Patient presents with oropharyngeal dysphagia secondary to prolonged mastication, prolonged A-P bolus propulsion, premature bolus loss, minimally delayed swallow initiation, decreased tongue base retraction, decreased epiglottic ROM, decreased pharyngeal stripping wave and reduced pharyngoesophageal segment opening complicated by advanced age, comorbidities resulting in observed intermittent laryngeal penetration with thin liquids (chin tuck and neutral head posture) and collection of vallecular and pyriform stasis (soft solids, solids). No aspiration was viewed during this study. Patient is at an increased risk of aspiration due to intermittent laryngeal penetration with thin liquids. Previous MBS recommended chin tuck with thin liquids, trials of thins this date were presented with head in neutral position and with strategy of chin tuck. Intermittent laryngeal penetration was assessed during the swallow with both head postures, laryngeal penetration appeared to be due to delayed swallow initiation and decreased airway closure. After the swallow with thin liquids questionable retrograde flow was noted to the pyriform from esophagus.  Collection of vallecular residue was assessed with all textures (increased residue was noted with solids) this appeared to be due to decreased tongue base retraction, reduced epiglottic inversion and decreased pharyngoesophageal segment opening. Pt appeared to benefit from use of liquid wash or double swallow. He appeared to demonstrate decreased sensation for pharyngeal stasis. Following study throat clearing was noted question if this was due to laryngeal penetration, pharyngeal stasis or possible reflux. See below for recommendations. Aspiration/Penetration Risk:  Mild/moderate due to laryngeal penetration with thin liquids     Recommendations:    Diet Level:   Dysphagia III Soft and bite sized  with Thin liquids   Medication: Meds in puree     Referral: Based on findings from MBS, consider referral to Gastroenterology (GI) if there is concern for reflux   Strategies: Alternate solids/liquids , Upright as possible with all PO intake , Small bites/sips , Swallow 2 times per bite , Eat/feed slowly, Remain upright 30-45 min , Cough/re-swallow     Treatments: Ongoing dysphagia therapy with SLP   Goals:  1. Pt will functionally tolerate recommended diet level with use of recommended compensatory strategies with no s/s of aspiration/penetration   2. Pt/family will demonstrate understanding of results Modified Barium Swallow Study, risk for aspiration, rationale for diet level and compensatory strategies  3.  Pt will demonstrate improved sensory motor function via targeted exercises and appropriate treatment modalities     Consistencies given: thin liquids, mildly (nectar) thick liquids , puree , soft solids and regular solids     Oral Phase  Prolonged/impaired mastication; slow prolonged chewing/mashing with complete re-collection    Premature bolus loss   Impaired A-P bolus transport; repetitive/disorganized tongue motion     Pharyngeal Phase  Pharyngeal pooling prior to swallow initiation; pooling to the laryngeal vestibule   Delayed swallow initiation   Decreased anterior hyoid movement Decreased epiglottic movement   Diminished pharyngeal stripping wave   Decreased laryngeal elevation; partial superior movement of thyroid cartilage/parial approximation of arytenoids to epiglottic petiole    Decreased pharyngoesophageal segment opening; partial distension/partial duration; partial obstruction of flow    Decreased tongue base retraction; narrow column of contrast of air between tongue base and posterior pharyngeal wall   Pharyngeal residue; collection of residue within or on pharyngeal structures      Penetration/Aspiration Scores across consistencies   CONSISTENCY  Pen/Asp rating Description    Thin   1) Material does not enter the airway  2) Material enters the airway, remains above the vocal folds, and is ejected from the airway  3) Material enters the airway, remains above the vocal folds, and is not ejected from the airway     Mildly (nectar) thick   1) Material does not enter the airway     Puree   1) Material does not enter the airway     Soft Solid   1) Material does not enter the airway     Regular Solid   1) Material does not enter the airway            Esophageal Phase  Unremarkable    Following Evaluation:  Provided education regarding role of SLP, results of assessment, recommendations and general speech pathology plan of care.    [x] Pt verbalized understanding and agreement   [] Pt requires ongoing learning   [] No evidence of comprehension     Timed Code Treatment:  0 minutes     Total Treatment Time: 30 minutes    Signature:  Peter Costello MA CCC-SLP #[id number]  Speech Language Pathologist

## 2022-04-25 NOTE — PROGRESS NOTES
Patient is content doing. Pt alert and oriented. Pt doing well moving and in the chair during the day with getting up to the bathroom. Urology addressed swollen penis and issue with foreskin not going down. Pt is urinating well. Urine is bloody.

## 2022-04-25 NOTE — PROGRESS NOTES
Occupational Therapy  Occupational Therapy  Daily Treatment Note  Patient Name: Cristiane Salas  MRN: [id number]    Chart Reviewed: Yes   Discharge Recommendations: Cristiane Salas scored a 19/24 on the AM-PAC ADL Inpatient form. Current research shows that an AM-PAC score of 18 or greater is typically associated with a discharge to the patient's home setting. Based on the patient's AM-PAC score, and their current ADL deficits, it is recommended that the patient have 2-3 sessions per week of Occupational Therapy at d/c to increase the patient's independence. At this time, this patient demonstrates the endurance and safety to discharge home with HHOT and initial 24 hr assist and a follow up treatment frequency of 2-3x/wk. Please see assessment section for further patient specific details. HOME HEALTH CARE: LEVEL 3 SAFETY     - Initial home health evaluation to occur within 24-48 hours, in patient home   - Therapy evaluations in home within 24-48 hours of discharge; including DME and home safety   - Frontload therapy 5 days, then 3x a week   - Therapy to evaluate if patient has [address] needs for personal care   -  evaluation within 24-48 hours, includes evaluation of resources and insurance to determine [place], [place], LTC, and Medicaid options   If patient discharges prior to next session this note will serve as a discharge summary. Please see below for the latest assessment towards goals. Restrictions/Precautions: Modified Diet,Fall Risk,Up as Tolerated Other position/activity restrictions: Patient is a 80 y.o. male with past medical history significant for BPH, GERD, hypertension, hyperlipidemia, diabetes mellitus type 2 who presented to hospital with complaints of malaise, chills and weakness with shortness of breath. Going on for few days. Patient was noted to be hypoxic in the ER. He was admitted for multifocal pneumonia. Started on IV antibiotics.   He has been having some periods of lethargy and confusion. ABG was performed which was suggestive of acute hypercapnia. Additional Pertinent Hx: has a past medical history of BPH, Elevated prostate specific antigen (PSA), Esophageal reflux, Fracture closed, humerus, HTN (hypertension), Hyperlipidemia, Osteoarthritis, Peripheral neuropathy, Peripheral neuropathy, and Type II or unspecified type diabetes mellitus without mention of complication, not stated as uncontrolled. Past Surgical History: has a past surgical history that includes knee surgery (1998); Ankle surgery (2002); Tonsillectomy (1927); shoulder surgery (02/09/10); Colonoscopy (10-02); Cataract removal (10-03); Total knee arthroplasty (4-20-07); Revision total knee arthroplasty (1-14-09); Revision total knee arthroplasty (2-23-09); and Total knee arthroplasty (5-06). Diagnosis: Hypoxia  Treatment Diagnosis: Above deficits associated with hypoxia    Subjective: Pt seated in recliner upon entry, very pleasant and motivated for therapy. Pt on 4L O2 and WNL stats. General Comment: Pt sit to stand SBA, pt ambulated SBA ~15 ft to bathroom sink with rw. Pt in stance ~10 min SBA at sink to shave. Pt with seated rest break on toilet, SBA for transfer to/from toilet with pt not toileting. Pt in stance then ~5 min SBA at sink with oral care/wash face/comb hair. Pt ambulated with rw SBA ~15 ft back to recliner. Pt stand to sit SBA. Call light in reach and chair alarm on.     Pain:     Social/Functional History  Lives With: Spouse  Type of Home: House (Independent living at [facility])  Home Layout: One level  Home Access: Level entry  Bathroom Shower/Tub: Walk-in shower  Bathroom Equipment: Grab bars in shower,Built-in shower seat,Hand-held shower  Home Equipment: [place], lou  Has the patient had two or more falls in the past year or any fall with injury in the past year?: No  Receives Help From: Home health  ADL Assistance: Independent  Homemaking Assistance: Needs assistance (has home assist in the morning for 4 hours - prepares breakfast and cleans up)  Homemaking Responsibilities: No  Ambulation Assistance: Independent  Transfer Assistance: Independent  Active : Yes  Occupation: Retired  Type of Occupation: finance for [employer]: watch TV, take walks  Additional Comments: Pt reports that his wife has gotten frail recently; pt has adult children who live locally  Prior Function  Receives Help From: Home health  ADL Assistance: Independent  Homemaking Assistance: Needs assistance (has home assist in the morning for 4 hours - prepares breakfast and cleans up)  Ambulation Assistance: Independent  Transfer Assistance: Independent  Additional Comments: Pt reports that his wife has gotten frail recently; pt has adult children who live locally    Objective:    Cognition/Orientation:    Bed mobility   Rolling:  Supine to sit:   Sit to Supine:   Scooting:    Functional Mobility   Sit to Stand: SBA  Stand to Sit: SBA  Bed to Chair Transfer: SBA  Commode Transfer: SBA  Other:     ADLs   Grooming: SBA  Bathing: N/A  UB dressing: N/A  LB dressing: N/A  Toileting: N/A  Other:    UE Exercises     Activity Tolerance:  Pt tolerated therapy well with 4L O2  Patient Education: transfer training, POC, O2 line mgmt    Safety Devices in Place: chair alarm on and call light in reach    Assessment: Pt tolerated therapy session well, SBA for functional mobility and transfers with good O2 line mgmt. Pt tolerated ~10 min + ~5 min of grooming in stance at sink SBA. Recommend d/c home with initial 24 hr assist and HHOT Level 3. Continue with POC.         Equipment Needs:  None    Second Session Therapy Time:   Individual Concurrent Group Co-treatment   Time In  1017         Time Out  1040         Minutes  23           Timed Code Treatment Minutes:  23    Total Treatment Minutes:  23    Goals:  Short Term Goals  Time Frame for Short term goals: discharge  Short Term Goal 1: UB ADL set up goal not addressed 4/25  Short Term Goal 2: LB ADL min A - goal not addressed 4/25  Short Term Goal 3: Fxl transfers SBA - goal met chair/toilet 4/25  Short Term Goal 4: Toileting SBA - goal not addressed 4/25  Short Term Goal 5: Fxl mob SBA - goal met 4/25  Long Term Goals  Time Frame for Long term goals : LTG=STG      Plan:      Times per Week: 3-5   Times per Day: Daily    If patient is discharged prior to next treatment, this note will serve as the discharge summary.     Shaniqua Miller, [address]

## 2022-04-25 NOTE — CARE COORDINATION
Discharge Planning     Noted consult for discharge planning. Chart reviewed. Noted recommendations for SNF (ARU). CM met with patient. Introduced self and explained role of CM and discharge planning. Patient is declining SNF on dc. Pt reported wanting to return home, stating \"I am having to pay someone daily to stay with my wife, I have been gone long enough. \"   CM reviewed PT/OT notes and noted AM-PAC score PT-14, OT-16 on 4/23/22. CM spoke to therapy staff to request a re-evaluation. Pt will need the following upon dc. 1. HHC Rx  2. Home O2 eval and updated home O2 Rx  3. Possible need for DME to return home    Electronically signed by Rogers Rogers RN on 4/25/2022 at 1:25 PM       Referral sent to [facility].      Electronically signed by Rogers Rogers RN on 4/25/2022 at 1:32 PM

## 2022-04-25 NOTE — PROGRESS NOTES
Janak's assistant called the son and stated that pt was refusing SNF and they stated that they would talk to him because he needs one.

## 2022-04-25 NOTE — CONSULTS
Harris Velasquez  4/25/2022  [id number]    Rehab Brief Consult:    Patient unfortunately does not have a diagnosis this admission that would support an ARU stay given his insurance of [facility] Medicare. Suggest SNF for continued therapies when medically appropriate. We will sign off. Liaison to discuss with CM. Thank you for the consultation.     Lisa Villanueva MD 4/25/2022 10:03 AM

## 2022-04-25 NOTE — PLAN OF CARE
Problem: ABCDS Injury Assessment  Goal: Absence of physical injury  Outcome: Progressing     Problem: Safety - Adult  Goal: Free from fall injury  Outcome: Progressing     Problem: Confusion  Goal: Confusion, delirium, dementia, or psychosis is improved or at baseline  Description: INTERVENTIONS:  1. Assess for possible contributors to thought disturbance, including medications, impaired vision or hearing, underlying metabolic abnormalities, dehydration, psychiatric diagnoses, and notify attending LIP  2. [place] high risk fall precautions, as indicated  3. Provide frequent short contacts to provide reality reorientation, refocusing and direction  4. Decrease environmental stimuli, including noise as appropriate  5. Monitor and intervene to maintain adequate nutrition, hydration, elimination, sleep and activity  6. If unable to ensure safety without constant attention obtain sitter and review sitter guidelines with assigned personnel  7. Initiate Psychosocial CNS and Spiritual Care consult, as indicated  Outcome: Progressing     Problem: Pain  Goal: Verbalizes/displays adequate comfort level or baseline comfort level  Outcome: Progressing     Problem: Chronic Conditions and Co-morbidities  Goal: Patient's chronic conditions and co-morbidity symptoms are monitored and maintained or improved  Outcome: Progressing     Problem: Discharge Planning  Goal: Discharge to home or other facility with appropriate resources  Outcome: Progressing     Problem: Coping  Goal: Pt/Family able to verbalize concerns and demonstrate effective coping strategies  Description: INTERVENTIONS:  1. Assist patient/family to identify coping skills, available support systems and cultural and spiritual values  2. Provide emotional support, including active listening and acknowledgement of concerns of patient and caregivers  3. Reduce environmental stimuli, as able  4. Instruct patient/family in relaxation techniques, as appropriate  5.  Assess for spiritual pain/suffering and initiate Spiritual Care, Psychosocial Clinical Specialist consults as needed  Outcome: Progressing     Problem: Decision Making  Goal: Pt/Family able to effectively weigh alternatives and participate in decision making related to treatment and care  Description: INTERVENTIONS:  1. Determine when there are differences between patient's view, family's view, and healthcare provider's view of condition  2. Facilitate patient and family articulation of goals for care  3. Help patient and family identify pros/cons of alternative solutions  4. Provide information as requested by patient/family  5. Respect patient/family right to receive or not to receive information  6. Serve as a liaison between patient and family and health care team  7. Initiate Consults from Ethics, Palliative Care or initiate [address] [place] [address] as is appropriate  Outcome: Progressing     Problem: Behavior  Goal: Pt/Family maintain appropriate behavior and adhere to behavioral management agreement, if implemented  Description: INTERVENTIONS:  1. Assess patient/family's coping skills and  non-compliant behavior (including use of illegal substances)  2. Notify security of behavior or suspected illegal substances which indicate the need for search of the patient and/or belongings  3. Encourage verbalization of thoughts and concerns in a socially appropriate manner  4. Utilize positive, consistent limit setting strategies supporting safety of patient, staff and others  5. Encourage participation in the decision making process about the behavioral management agreement  6. Implement a Health Care Agreement if patient meets criteria  7. If a patient's behavior jeopardizes the safety of the patient, staff, or others refer to organization policy. If a visitor's behavior poses a threat to safety call refer to organization policy.   8. Initiate consult with , Psychosocial CNS, Spiritual Care as appropriate  Outcome: Progressing     Problem: Skin/Tissue Integrity  Goal: Absence of new skin breakdown  Description: 1. Monitor for areas of redness and/or skin breakdown  2. Assess vascular access sites hourly  3. Every 4-6 hours minimum:  Change oxygen saturation probe site  4. Every 4-6 hours:  If on nasal continuous positive airway pressure, respiratory therapy assess nares and determine need for appliance change or resting period.   Outcome: Progressing

## 2022-04-25 NOTE — PROGRESS NOTES
04/24/22 2044   RT Protocol   History Pulmonary Disease 0   Respiratory pattern 0   Breath sounds 2   Cough 0   Indications for Bronchodilator Therapy None   Bronchodilator Assessment Score 2

## 2022-04-26 ENCOUNTER — TELEPHONE (OUTPATIENT)
Dept: PULMONOLOGY | Age: 87
End: 2022-04-26

## 2022-04-26 PROBLEM — J96.02 ACUTE RESPIRATORY FAILURE WITH HYPOXIA AND HYPERCAPNIA (HCC): Status: ACTIVE | Noted: 2022-04-26

## 2022-04-26 PROBLEM — R53.81 DEBILITY: Status: ACTIVE | Noted: 2022-04-26

## 2022-04-26 PROBLEM — J96.01 ACUTE RESPIRATORY FAILURE WITH HYPOXIA AND HYPERCAPNIA (HCC): Status: ACTIVE | Noted: 2022-04-26

## 2022-04-26 PROBLEM — R33.9 URINARY RETENTION: Status: ACTIVE | Noted: 2022-04-26

## 2022-04-26 LAB
ANION GAP SERPL CALCULATED.3IONS-SCNC: 10 MMOL/L (ref 3–16)
BUN BLDV-MCNC: 19 MG/DL (ref 7–20)
CALCIUM SERPL-MCNC: 9.5 MG/DL (ref 8.3–10.6)
CHLORIDE BLD-SCNC: 102 MMOL/L (ref 99–110)
CO2: 31 MMOL/L (ref 21–32)
CREAT SERPL-MCNC: 0.9 MG/DL (ref 0.8–1.3)
GFR AFRICAN AMERICAN: >60
GFR NON-AFRICAN AMERICAN: >60
GLUCOSE BLD-MCNC: 104 MG/DL (ref 70–99)
GLUCOSE BLD-MCNC: 110 MG/DL (ref 70–99)
GLUCOSE BLD-MCNC: 114 MG/DL (ref 70–99)
GLUCOSE BLD-MCNC: 115 MG/DL (ref 70–99)
GLUCOSE BLD-MCNC: 116 MG/DL (ref 70–99)
HCT VFR BLD CALC: 38.4 % (ref 40.5–52.5)
HEMOGLOBIN: 12.5 G/DL (ref 13.5–17.5)
MCH RBC QN AUTO: 28.3 PG (ref 26–34)
MCHC RBC AUTO-ENTMCNC: 32.7 G/DL (ref 31–36)
MCV RBC AUTO: 86.6 FL (ref 80–100)
PDW BLD-RTO: 15.7 % (ref 12.4–15.4)
PERFORMED ON: ABNORMAL
PLATELET # BLD: 173 K/UL (ref 135–450)
PMV BLD AUTO: 8 FL (ref 5–10.5)
POTASSIUM SERPL-SCNC: 3.8 MMOL/L (ref 3.5–5.1)
RBC # BLD: 4.43 M/UL (ref 4.2–5.9)
SODIUM BLD-SCNC: 143 MMOL/L (ref 136–145)
WBC # BLD: 3.6 K/UL (ref 4–11)

## 2022-04-26 PROCEDURE — 6370000000 HC RX 637 (ALT 250 FOR IP): Performed by: NURSE PRACTITIONER

## 2022-04-26 PROCEDURE — 2580000003 HC RX 258: Performed by: INTERNAL MEDICINE

## 2022-04-26 PROCEDURE — 94760 N-INVAS EAR/PLS OXIMETRY 1: CPT

## 2022-04-26 PROCEDURE — 80048 BASIC METABOLIC PNL TOTAL CA: CPT

## 2022-04-26 PROCEDURE — 97530 THERAPEUTIC ACTIVITIES: CPT

## 2022-04-26 PROCEDURE — 85027 COMPLETE CBC AUTOMATED: CPT

## 2022-04-26 PROCEDURE — 1200000000 HC SEMI PRIVATE

## 2022-04-26 PROCEDURE — 2700000000 HC OXYGEN THERAPY PER DAY

## 2022-04-26 PROCEDURE — 6360000002 HC RX W HCPCS: Performed by: INTERNAL MEDICINE

## 2022-04-26 PROCEDURE — 97116 GAIT TRAINING THERAPY: CPT

## 2022-04-26 PROCEDURE — 36415 COLL VENOUS BLD VENIPUNCTURE: CPT

## 2022-04-26 PROCEDURE — 6370000000 HC RX 637 (ALT 250 FOR IP): Performed by: INTERNAL MEDICINE

## 2022-04-26 PROCEDURE — 94680 O2 UPTK RST&XERS DIR SIMPLE: CPT

## 2022-04-26 PROCEDURE — 92526 ORAL FUNCTION THERAPY: CPT

## 2022-04-26 PROCEDURE — 51798 US URINE CAPACITY MEASURE: CPT

## 2022-04-26 RX ORDER — FINASTERIDE 5 MG/1
5 TABLET, FILM COATED ORAL DAILY
Qty: 30 TABLET | Refills: 3
Start: 2022-04-27

## 2022-04-26 RX ORDER — CYANOCOBALAMIN (VITAMIN B-12) 250 MCG
250 TABLET ORAL DAILY
Qty: 30 TABLET | Refills: 0
Start: 2022-04-26

## 2022-04-26 RX ORDER — AZITHROMYCIN 500 MG/1
500 TABLET, FILM COATED ORAL DAILY
Qty: 2 TABLET | Refills: 0
Start: 2022-04-27 | End: 2022-04-29

## 2022-04-26 RX ORDER — ALBUTEROL SULFATE 2.5 MG/3ML
2.5 SOLUTION RESPIRATORY (INHALATION) EVERY 4 HOURS PRN
Qty: 120 EACH | Refills: 3
Start: 2022-04-26

## 2022-04-26 RX ORDER — LACTOBACILLUS RHAMNOSUS GG 10B CELL
1 CAPSULE ORAL 2 TIMES DAILY WITH MEALS
Qty: 20 CAPSULE | Refills: 0
Start: 2022-04-26 | End: 2022-05-06

## 2022-04-26 RX ADMIN — Medication 1 CAPSULE: at 19:02

## 2022-04-26 RX ADMIN — GABAPENTIN 400 MG: 400 CAPSULE ORAL at 21:50

## 2022-04-26 RX ADMIN — ENOXAPARIN SODIUM 40 MG: 100 INJECTION SUBCUTANEOUS at 08:45

## 2022-04-26 RX ADMIN — DOXAZOSIN 4 MG: 4 TABLET ORAL at 08:45

## 2022-04-26 RX ADMIN — AMLODIPINE BESYLATE 5 MG: 5 TABLET ORAL at 08:45

## 2022-04-26 RX ADMIN — Medication 10 ML: at 21:50

## 2022-04-26 RX ADMIN — PANTOPRAZOLE SODIUM 40 MG: 40 TABLET, DELAYED RELEASE ORAL at 15:35

## 2022-04-26 RX ADMIN — PANTOPRAZOLE SODIUM 40 MG: 40 TABLET, DELAYED RELEASE ORAL at 05:43

## 2022-04-26 RX ADMIN — GABAPENTIN 400 MG: 400 CAPSULE ORAL at 15:35

## 2022-04-26 RX ADMIN — GABAPENTIN 400 MG: 400 CAPSULE ORAL at 19:01

## 2022-04-26 RX ADMIN — AZITHROMYCIN MONOHYDRATE 500 MG: 250 TABLET ORAL at 08:45

## 2022-04-26 RX ADMIN — Medication 1 CAPSULE: at 08:45

## 2022-04-26 RX ADMIN — Medication 10 ML: at 08:46

## 2022-04-26 RX ADMIN — GABAPENTIN 400 MG: 400 CAPSULE ORAL at 08:45

## 2022-04-26 RX ADMIN — FINASTERIDE 5 MG: 5 TABLET, FILM COATED ORAL at 08:45

## 2022-04-26 ASSESSMENT — PAIN SCALES - GENERAL: PAINLEVEL_OUTOF10: 0

## 2022-04-26 NOTE — PROGRESS NOTES
Physician Progress Note      Brandon Daniels  CSN #:                  [id number]  :                       1925  ADMIT DATE:       2022 8:13 PM  DISCH DATE:  RESPONDING  PROVIDER #:        Jen Sánchez CNP          QUERY TEXT:    Patient admitted with Multifocal Pneumonia. Noted documentation of Acute   Respiratory Failure with Hypoxia and Hypercapnia in Pulmonary Consult on    and in IM progress note 22 Acute on Chronic Respiratory Failure   w/Hypoxia. If possible, please document in progress notes and discharge   summary if you are evaluating and /or treating any of the following: The medical record reflects the following:  Risk Factors: Age, Multifocal PNA, RF w/Hypoxia & Hypercapnia, BPH w/ lower   symptoms, DM2, GERD, HLD  Clinical Indicators: Per Pulmonary consult  \"Impression: Acute hypercapnic   and hypoxic respiratory failure on ABG. Start BiPAP  and titrate BiPAP   settings with tidal volumes of 400-450 cc. Titrate FiO2 for saturation above   92%. \"  Per IM note  \"Assessment/Plan: Acute on chronic hypoxic   respiratory failure. Noted to have O2 sat 86% on room air and increased work   of breathing. Secondary to multifocal pneumonia and noncompliance with O2. O2   sats currently mid 90s on 4 liters, baseline O2 believed to be 2 liters. Treatment as above. \"  Pt. was initially placed on Bip  Treatment: Pulmonary consult, Albuterol Nebulizer tx.'s, iv/po Zithromax, iv   Rocephin, CxR, CT Chest/PE, monitor resp. status and labs  Options provided:  -- Acute Respiratory Failure with Hypoxia and Hypercapnia confirmed and Acute   on Chronic Respiratory Failure w/Hypoxia & Hypercapnia ruled out  -- Acute on Chronic Respiratory Failure w/Hypoxia & Hypercapnia confirmed and   Acute Respiratory Failure with Hypoxia and Hypercapnia ruled out  -- Other - I will add my own diagnosis  -- Disagree - Not applicable / Not valid  -- Disagree - Clinically unable to determine / Unknown  -- Refer to Clinical Documentation Reviewer    PROVIDER RESPONSE TEXT:    After study, Acute on Chronic Respiratory Failure w/Hypoxia & Hypercapnia   confirmed and Acute Respiratory Failure with Hypoxia and Hypercapnia ruled   out.     Query created by: Tala Winkler on 4/25/2022 11:02 AM      Electronically signed by:  Flavia Sánchez CNP 4/25/2022 9:18 PM

## 2022-04-26 NOTE — PLAN OF CARE
Problem: ABCDS Injury Assessment  Goal: Absence of physical injury  4/25/2022 2326 by Jenniffer Melton RN  Outcome: Progressing       Problem: Safety - Adult  Goal: Free from fall injury  4/25/2022 2326 by Jenniffer Melton RN  Outcome: Progressing    Problem: Confusion  Goal: Confusion, delirium, dementia, or psychosis is improved or at baseline  Description: INTERVENTIONS:  1. Assess for possible contributors to thought disturbance, including medications, impaired vision or hearing, underlying metabolic abnormalities, dehydration, psychiatric diagnoses, and notify attending LIP  2. [place] high risk fall precautions, as indicated  3. Provide frequent short contacts to provide reality reorientation, refocusing and direction  4. Decrease environmental stimuli, including noise as appropriate  5. Monitor and intervene to maintain adequate nutrition, hydration, elimination, sleep and activity  6. If unable to ensure safety without constant attention obtain sitter and review sitter guidelines with assigned personnel  7. Initiate Psychosocial CNS and Spiritual Care consult, as indicated  4/25/2022 2326 by Jenniffer Melton RN  Outcome: Progressing       Problem: Coping  Goal: Pt/Family able to verbalize concerns and demonstrate effective coping strategies  Description: INTERVENTIONS:  1. Assist patient/family to identify coping skills, available support systems and cultural and spiritual values  2. Provide emotional support, including active listening and acknowledgement of concerns of patient and caregivers  3. Reduce environmental stimuli, as able  4. Instruct patient/family in relaxation techniques, as appropriate  5.  Assess for spiritual pain/suffering and initiate Spiritual Care, Psychosocial Clinical Specialist consults as needed  4/25/2022 2326 by Jenniffer Melton RN  Outcome: Progressing       Problem: Decision Making  Goal: Pt/Family able to effectively weigh alternatives and participate in decision making related to treatment and care  Description: INTERVENTIONS:  1. Determine when there are differences between patient's view, family's view, and healthcare provider's view of condition  2. Facilitate patient and family articulation of goals for care  3. Help patient and family identify pros/cons of alternative solutions  4. Provide information as requested by patient/family  5. Respect patient/family right to receive or not to receive information  6. Serve as a liaison between patient and family and health care team  7. Initiate Consults from Ethics, Palliative Care or initiate [address] [facility] [place] as is appropriate  4/25/2022 2326 by Trisha Neal RN  Outcome: Progressing    Problem: Decision Making  Goal: Pt/Family able to effectively weigh alternatives and participate in decision making related to treatment and care  Description: INTERVENTIONS:  1. Determine when there are differences between patient's view, family's view, and healthcare provider's view of condition  2. Facilitate patient and family articulation of goals for care  3. Help patient and family identify pros/cons of alternative solutions  4. Provide information as requested by patient/family  5. Respect patient/family right to receive or not to receive information  6. Serve as a liaison between patient and family and health care team  7. Initiate Consults from Ethics, Palliative Care or initiate [facility] as is appropriate  4/25/2022 2326 by Trisha Neal RN  Outcome: Progressing       Problem: Behavior  Goal: Pt/Family maintain appropriate behavior and adhere to behavioral management agreement, if implemented  Description: INTERVENTIONS:  1. Assess patient/family's coping skills and  non-compliant behavior (including use of illegal substances)  2. Notify security of behavior or suspected illegal substances which indicate the need for search of the patient and/or belongings  3.  Encourage verbalization of thoughts and concerns in a socially appropriate manner  4. Utilize positive, consistent limit setting strategies supporting safety of patient, staff and others  5. Encourage participation in the decision making process about the behavioral management agreement  6. Implement a Health Care Agreement if patient meets criteria  7. If a patient's behavior jeopardizes the safety of the patient, staff, or others refer to organization policy. If a visitor's behavior poses a threat to safety call refer to organization policy.   8. Initiate consult with , Psychosocial CNS, Spiritual Care as appropriate  4/25/2022 2326 by Saurabh Ramirez RN  Outcome: Progressing

## 2022-04-26 NOTE — PROGRESS NOTES
Urology Progress Note  [facility]    Provider: MURALI Loomis CNP, MD Patient ID:  Admission Date: 2022 Name: Julita Medina Date: 2022 MRN: [id number]   Patient Location: [place] [id number] : 1925  Attending: Cristiane Trotter MD Date of Service: 2022  PCP: Tanesha Solis MD     Diagnoses:  1. Hypoxia    2. Pneumonia due to infectious organism, unspecified laterality, unspecified part of lung       AMS  Urinary retention  GH       Assessment/Plan:  79 yo M with multifocal PNA and hypercapnea. Found to be retaining urine, CIC x1 for 636, bladder scan 400 and escobar inserted. Now with some GH. Patient denies any past  hx although son said he has been seen for elevated PSAs in the past. Denies any baseline LUTS or LUTS prior to cath insertion   Urine clearing this AM    Paraphimosis reduced yesterday. Escobar out and voiding spontaneously. PVR is 180cc's. - continue cardura and proscar  - Continue to monitor for retention- PVR now 180cc's  - Urine is clear yellow   - Urology will sign out, call with any questions         Subjective:   Ashley Elizabeth is a 80 y.o. male. He was seen and examined this morning. Patient was sleeping     Objective:   Vitals:  Vitals:    22 0845   BP: (!) 160/62   Pulse:    Resp:    Temp:    SpO2:        Intake/Output Summary (Last 24 hours) at 2022 1135  Last data filed at 2022 1050  Gross per 24 hour   Intake --   Output 1475 ml   Net -1475 ml     Physical Exam:  Gen: NAD, sleeping  : phimosis without paraphimosis.     Labs:  Lab Results   Component Value Date    WBC 3.6 (L) 2022    HGB 12.5 (L) 2022    HCT 38.4 (L) 2022    MCV 86.6 2022     2022     Lab Results   Component Value Date    CREATININE 0.9 2022    BUN 19 2022     2022    K 3.8 2022     2022    CO2 31 2022       MURALI Loomis - ERICK RANDOLPH  2022

## 2022-04-26 NOTE — TELEPHONE ENCOUNTER
Teo Chau from [facility] wanted to follow-up on patient and make sure he didn't need a BiPAP or anything else from him. If something is needed, please reach out to Teo Chau.

## 2022-04-26 NOTE — ACP (ADVANCE CARE PLANNING)
ADVANCED CARE PLANNING    Name:Jr Negrete       :  1925              MRN:  [id number]      Purpose of Encounter: Advanced care planning. Parties in attendance: :Micky Mcpherson MD.  Decisional Capacity:Yes    Diagnosis: Principal Problem:    Multifocal pneumonia  Active Problems:    Type 2 diabetes mellitus ([facility] [address]    Acute respiratory failure with hypoxia and hypercapnia (HCC)    Debility    Urinary retention    Esophageal reflux    HTN (hypertension)  Resolved Problems:    * No resolved hospital problems. *    Patients Medical Story: Patient is a 81 yo male with hx BPH, GERD, HTN, HLD, DM2. He presented with malaise, weakness, chills. Noted to be hypoxic in ER with saturation 86% on room air. CXR with multifocal infiltrates, CT pulmonary negative for PE but showed multifocal pneumonia. He was admitted and started on IV antibiotics. Goals of Care Determinations: Patient wishes to focus on life sustaning treatment. Plan: Will notify Antonette Martines MD of change in care plan. Will look at further interventions as needed. POA: Daughter  Code Status: At this time patient wishes to be Full Code  Time Spent with Patient: 16 minutes      Electronically signed by Shana Oakley MD on 2022 at 12:44 PM  Thank you Antonette Martines MD for the opportunity to be involved in this patient's care.

## 2022-04-26 NOTE — PROGRESS NOTES
Patient has arrived to 5T. Vital signs obtained. Patient is awake and currently resting in bed, semi-fowlers. Patient does not appear to be in distress. Patient oriented to room and call light. Plan of care discussed with patient, patient agreeable. Call light within reach. No further needs expressed.

## 2022-04-26 NOTE — PROGRESS NOTES
Assessment completed, see doc flowsheets. Pt is A&Ox. Lung sounds are diminished. VSS. Tele on. Medication given per [facility]. Patient has no needs at this time. Call light within in reach, will continue to monitor.

## 2022-04-26 NOTE — PROGRESS NOTES
Nutrition Note    RECOMMENDATIONS  1. PO Diet: Continue current diet   2. ONS: None  3. Nutrition Support: None      NUTRITION ASSESSMENT   Pt assessed for nutrition risk. Pt with good appetite and PO intake, consuming at least 50% of meals. Skin is in tact. Pt appears well nourished. Deemed to be at low nutrition risk at this time. Will continue to monitor for changes in status.  Nutrition Related Findings: No edema noted. LBM 4/25.  Wounds: None   Nutrition Education:  Education not indicated     MALNUTRITION ASSESSMENT   Malnutrition Status: No malnutrition    NUTRITION DIAGNOSIS   No nutrition diagnosis at this time     CURRENT NUTRITION THERAPIES  ADULT DIET; Dysphagia - Soft and Bite Sized; No Drinking Straws     PO Intake: 51-75%,%   PO Supplement Intake:None Ordered    ANTHROPOMETRICS   Current Height: 5' 11\" (180.3 cm)   Current Weight: 235 lb 14.3 oz (107 kg)     Ideal Body Weight (IBW): 172 lbs  (78 kg)        BMI: 32.9    The patient will be monitored per nutrition standards of care. Consult dietitian if additional nutrition interventions are needed prior to RD reassessment.      Janae Roman, [address],     Contact: [phone]

## 2022-04-26 NOTE — DISCHARGE SUMMARY
[facility]    Patient Demographics    Himanshu Morales  Date of Birth. 12/22/1925  MRN. [id number]     Primary care provider. Mary Avelar MD  (Tel: [phone])    Admit date: 4/20/2022    Discharge date (blank if same as Note Date): Note Date: 4/27/2022     Reason for Hospitalization. Chief Complaint   Patient presents with    Shortness of Breath     pt with tremors that started today in hands and feet after starting new med, pt also was 86% on RA. Placed on NRB and at 97%. [place] em[facility] from home. Significant Findings. Principal Problem:    Multifocal pneumonia  Active Problems:    Type 2 diabetes mellitus (HCC)    Acute respiratory failure with hypoxia and hypercapnia (HCC)    Debility    Urinary retention    Esophageal reflux    HTN (hypertension)  Resolved Problems:    * No resolved hospital problems. *       Problems and results from this hospitalization that need follow up. 1. Multifocal pneumonia  2. Urinary retention  3. Hypoxia    Significant test results and incidental findings. 1.   Fluoroscopy modified barium swallow with video   Final Result   Intermittent laryngeal penetration with thin liquid. No jennie aspiration. Please see separate speech pathology report for full discussion of findings   and recommendations. RECOMMENDATIONS:   Unavailable         CT CHEST PULMONARY EMBOLISM W CONTRAST   Final Result   1. Limited exam, though no pulmonary emboli seen through the lobar branches. 2. Patchy consolidative airspace disease seen in the lingula and bilateral   lower lobes, left greater than right felt to represent multifocal pneumonia. No reactive effusion. 3. Cardiomegaly, with coronary artery atherosclerotic disease and minimal   aortic valvular calcification. 4. Mildly enlarged precarinal lymph node felt likely reactive.   No bulky   lymphadenopathy in the chest.   5. Multilevel degenerative changes are seen in the spine, with diffuse   osteopenia. No acute fracture. XR CHEST PORTABLE   Final Result   Mild cardiomegaly and mild central pulmonary congestion with hazy left   perihilar opacity extending inferiorly which could be due to atypical   pulmonary edema and/or early pneumonia. Invasive procedures and treatments. 1. None     Problem-based Hospital Course. Patient is a 79 yo male with hx BPH, GERD, HTN, HLD, DM2. He presented with malaise, weakness, chills. Noted to be hypoxic in ER with saturation 86% on room air. CXR with multifocal infiltrates, CT pulmonary negative for PE but showed multifocal pneumonia. He was admitted and started on IV antibiotics.      Subsequently transferred to ICU secondary to somnolence and hypercapnia (PCO2 72). He was treated with BiPAP. Seroquel was believed to be contributing factor. He is back to baseline. He had escobar inserted for urinary retention, removed 4/24.        1. Multifocal pneumonia:  Presented with malaise and chills. CXR and CT scan with multifocal infiltrates. COVID-19 and influenza screen were negative. WBC 11.2->3.9, procal 0.31 on admit. He received IV ceftriaxone and levofloxacin in ER, started on ceftriaxone and po azithromycin on admission to complete a 7 day course. Blood cultures with NGTD. Continue bronchodilators. 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 Respiratory panel (4/23) negative. Remained afebrile with no leukocytosis. Pulmonary consulted; did not recommend an NIPPV on discharge. 2. Acute  on chronic respiratory failure with hypoxia and hypercapnia due to multifocal pneumonia:  Noted to have O2 sat 86% on room air at admission with increased work of breathing. O2 sats currently mid 90s on 2 liters. Goal FiO2 above 92%. Patient supposedly on 2 L nasal cannula at home. 3. Somnolence / hypercapnia:  Resolved. Suspect secondary to sedation from Seroquel. Improved with bipap, pCO2 66.2->72.6->56.4. Ammonia level 38. Pulmonary did not recommend NIPPV on discharge. 4. DM2, controlled. A1c 6.5%:  Not on hypoglycemic agents at home. 5. GERD: Continued PPI. 6. HTN: Patient will need close monitoring of BP on losartan, amlodipine & doxazosin. 7. Urinary retention / hematuria:  Patient straight cathed for bladder scan 636 ml, escobar subsequently inserted. Suspect hematuria from [address] cath placement. Escobar removed 4/25, patient has been voiding. Urology consulted and recommended recommended allowing for larger residuals given age and lack of symptoms, straight cath for PVR > 500 mls. Continued Proscar and doxazosin. Outpatient follow-up with urology. 8. Debility:  Secondary to acute illness. Evaluated by PT/OT and rehab recommended. Consults. IP CONSULT TO HOSPITALIST  IP CONSULT TO PALLIATIVE CARE  IP CONSULT TO UROLOGY  IP CONSULT TO PULMONOLOGY  IP CONSULT TO PHYSICAL MEDICINE REHAB    Physical examination on discharge day. BP (!) 167/82   Pulse 70   Temp 98.3 °F (36.8 °C) (Oral)   Resp 18   Ht 5' 11\" (1.803 m)   Wt 235 lb 14.3 oz (107 kg)   SpO2 94%   BMI 32.90 kg/m²   General appearance. Alert. Looks comfortable. HEENT. Sclera clear. Moist mucus membranes. Cardiovascular. Regular rate and rhythm, normal S1, S2. No murmur. Respiratory. Not using accessory muscles. Clear to auscultation bilaterally, no wheeze. Gastrointestinal. Abdomen soft, non-tender, not distended, normal bowel sounds  Neurology. Facial symmetry. No speech deficits. Moving all extremities equally. Extremities. No edema in lower extremities. Skin. Warm, dry, normal turgor      Condition at time of discharge: Stable. Medication instructions provided to patient at discharge.      Medication List      START taking these medications    albuterol (2.5 MG/3ML) 0.083% nebulizer solution  Commonly known as: PROVENTIL  Take 3 mLs by nebulization every 4 hours as needed for Wheezing     azithromycin 500 MG tablet  Commonly known as: ZITHROMAX  Take 1 tablet by mouth daily for 2 doses     cefTRIAXone  infusion  Commonly known as: ROCEPHIN  Infuse 1,000 mg intravenously every 24 hours for 2 days Compound per protocol     finasteride 5 MG tablet  Commonly known as: PROSCAR  Take 1 tablet by mouth daily     lactobacillus capsule  Take 1 capsule by mouth 2 times daily (with meals) for 10 days     losartan 25 MG tablet  Commonly known as: COZAAR  Take 1 tablet by mouth daily        CHANGE how you take these medications    amLODIPine 10 MG tablet  Commonly known as: NORVASC  Take 1 tablet by mouth daily  Start taking on: April 28, 2022  What changed:   · medication strength  · See the new instructions. B-12 250 MCG Tabs  Take 250 mcg by mouth daily  What changed:   · medication strength  · how much to take  · when to take this     doxazosin 4 MG tablet  Commonly known as: CARDURA  Take 1 tablet by mouth daily. What changed: Another medication with the same name was removed. Continue taking this medication, and follow the directions you see here.         CONTINUE taking these medications    gabapentin 100 MG capsule  Commonly known as: NEURONTIN     Protonix 20 MG tablet  Generic drug: pantoprazole     vitamin B-1 100 MG tablet  Commonly known as: THIAMINE        STOP taking these medications    amLODIPine-atorvastatatin 5-20 MG per tablet  Commonly known as: CADUET     anastrozole 1 MG tablet  Commonly known as: ARIMIDEX     atorvastatin 20 MG tablet  Commonly known as: LIPITOR     FREESTYLE LITE strip  Generic drug: blood glucose test strips     Lyrica 100 MG capsule  Generic drug: pregabalin     MCT OIL PO     omeprazole 20 MG delayed release capsule  Commonly known as: PriLOSEC     Rapaflo 8 MG Caps  Generic drug: silodosin     vitamin C 250 MG tablet           Where to Get Your Medications      Information about where to get these medications is not yet available    Ask your nurse or doctor about these medications  · albuterol (2.5 MG/3ML) 0.083% nebulizer solution  · amLODIPine 10 MG tablet  · azithromycin 500 MG tablet  · B-12 250 MCG Tabs  · cefTRIAXone  infusion  · finasteride 5 MG tablet  · lactobacillus capsule  · losartan 25 MG tablet         Discharge recommendations given to patient. Follow Up. With PCP in 1 week after discharge from SNF  Disposition. SNF  Activity. activity as tolerated  Diet: ADULT DIET; Dysphagia - Soft and Bite Sized; No Drinking Straws      Spent 35 minutes in discharge process.     Signed:  Keely Bland MD     4/27/2022 12:02 PM

## 2022-04-26 NOTE — CARE COORDINATION
CM met with pt and discussed the need for SNF prior to home. Pt VU and agreed to placement at [facility] since he lives on the [address] [place] [address] Dr. CM spoke to [address] at [facility] who accepted pt for placement pending Jin pre-cert. Pre-cert will be submitted once updated PT notes are available.      Electronically signed by Diego Barrett RN on 4/26/2022 at 10:13 AM

## 2022-04-26 NOTE — TELEPHONE ENCOUNTER
I have communicated with Alejandra Mccarthy. I do not think he needs NIV when he has BiPAP at home. But he does need more O2 to be bled through BiPAP at 4 L/min. Please send prescription for BiPAP 25/9 with O2 at 4 L/min to be bled into BiPAP.

## 2022-04-26 NOTE — CARE COORDINATION
CM received call from Rose Brewer at [facility] to report that the supporting documents did not come thru with the initial pre-cert request.  Meghan Ingram is having known online issues today which may be a factor. CM was asked to send the supporting documents manually to [facility]. [email or web address]. Pre-cert remains pending. Pulm is NOT recommending a NIPPV.      Electronically signed by Mikala Woo RN on 4/26/2022 at 3:53 PM

## 2022-04-26 NOTE — PROGRESS NOTES
04/26/22 1139   Resting (Room Air)   SpO2 89   HR 67   During Walk (Room Air)   SpO2 85   HR 86   Walk/Assistance Device Ambulation   Rate of Dyspnea 0   During Walk (On O2)   SpO2 91   HR 85   O2 Device Nasal cannula   O2 Flow Rate (l/min) 2 l/min   Need Additional O2 Flow Rate Rows No   Walk/Assistance Device Ambulation   Rate of Dyspnea 0   After Walk   SpO2 90   HR 86   O2 Device Nasal cannula   O2 Flow Rate (l/min) 2 l/min   Rate of Dyspnea 0   Does the Patient Qualify for Home O2 Yes   Liter Flow at Rest 2   Liter Flow on Exertion 2   Does the Patient Need Portable Oxygen Tanks Yes

## 2022-04-26 NOTE — PROGRESS NOTES
Pt had a confused episode as he took off his gown, nasal cannula and tele leads, stand up on bedside urinating on floor and got out of bed with without calling for staffs. pt got cleaned up, urinated un urinal and put back in bed. Pt is A&Ox3 but easily to be reoriented.

## 2022-04-26 NOTE — PROGRESS NOTES
Palliative Care:     Spoke with pt's daughter, Eder Figueroa, via phone again to touch base. She had several discharge questions about planning, timing, and suggested therapy. Resources provided (CM, RN contact info), some questions answered. Provided reinforcement to pt re: adherence to prescribed therapies (namely BiPAP) and their benefit to allowing pt to remain at home. Using teach-back, pt demonstrated his understanding. Pt confirmed his code status - FULL CODE - and his desire to pursue aggressive treatment if necessary to prolong his life.     Electronically signed by MALLORIE Byrne RN

## 2022-04-26 NOTE — PROGRESS NOTES
Physical Therapy  Facility/Department: [facility] ICU  Physical Therapy Treatment    Name: Osvaldo Hartmann  : 1925  MRN: [id number]  Date of Service: 2022    Discharge Recommendations:  Osvaldo Hartmann scored a 13/24 on the AM-PAC short mobility form. Current research shows that an AM-PAC score of 17 or less is typically not associated with a discharge to the patient's home setting. Based on the patient's AM-PAC score and their current functional mobility deficits, it is recommended that the patient have 3-5 sessions per week of Physical Therapy at d/c to increase the patient's independence. Please see assessment section for further patient specific details. If patient discharges prior to next session this note will serve as a discharge summary. Please see below for the latest assessment towards goals. IP Rehab,Continue to assess pending progress,Patient would benefit from continued therapy after discharge   PT Equipment Recommendations  Other: plan to continue to assess and likely defer recommendations to next level of care      Patient Diagnosis(es): The primary encounter diagnosis was Hypoxia. Diagnoses of Pneumonia due to infectious organism, unspecified laterality, unspecified part of lung and Multifocal pneumonia were also pertinent to this visit. Past Medical History:  has a past medical history of BPH, Elevated prostate specific antigen (PSA), Esophageal reflux, Fracture closed, humerus, HTN (hypertension), Hyperlipidemia, Osteoarthritis, Peripheral neuropathy, Peripheral neuropathy, and Type II or unspecified type diabetes mellitus without mention of complication, not stated as uncontrolled. Past Surgical History:  has a past surgical history that includes knee surgery (); Ankle surgery (); Tonsillectomy (); shoulder surgery (02/09/10); Colonoscopy (10-02); Cataract removal (10-03); Total knee arthroplasty (07); Revision total knee arthroplasty (09);  Revision total knee arthroplasty (2-23-09); and Total knee arthroplasty (5-06). Assessment   Body Structures, Functions, Activity Limitations Requiring Skilled Therapeutic Intervention: Decreased functional mobility ; Decreased cognition;Decreased balance;Decreased safe awareness;Decreased strength  Assessment: Patient demonstrates impaired functional mobility, requiring (A) with all standing mobility with and without RW, now also using supplemental O2 during mobility. Patient will continue to benefit from additional skilled PT intervention to facilitate safe mobility and to optimize (I) to promote return to prior level of function. Treatment Diagnosis: impaired functional mobility  Therapy Prognosis: Good  Barriers to Learning: decreased insight related to current deficits  Requires PT Follow-Up: Yes  Activity Tolerance  Activity Tolerance: Patient limited by endurance; Patient limited by fatigue     Plan   Plan  Plan: 3-5 times per week  Current Treatment Recommendations: Strengthening,Balance training,Functional mobility training,Transfer training,Endurance training,Neuromuscular re-education,Gait training,Patient/Caregiver education & training,Therapeutic activities,Equipment evaluation, education, & procurement,ROM,Home exercise program,Safety education & training  Safety Devices  Type of Devices: Call light within reach,Chair alarm in place,Gait belt,Left in chair,Nurse notified  Restraints  Restraints Initially in Place: No     Restrictions  Restrictions/Precautions  Restrictions/Precautions: Fall Risk (high fall risk, up as tolerated, adult diet - dysphagia, soft and bite sized, no drinking straws)  Required Braces or Orthoses?: No  Position Activity Restriction  Other position/activity restrictions: Patient is a 80 y.o. male with past medical history significant for BPH, GERD, hypertension, hyperlipidemia, diabetes mellitus type 2 who presented to hospital with complaints of malaise, chills and weakness with shortness of breath. Going on for few days. Patient was noted to be hypoxic in the ER. He was admitted for multifocal pneumonia. Started on IV antibiotics. He has been having some periods of lethargy and confusion. ABG was performed which was suggestive of acute hypercapnia. Subjective   General  Chart Reviewed: Yes  Family / Caregiver Present: Yes (son)  Follows Commands: Within Functional Limits  General Comment  Comments: Patient semifowlers in bed upon arrival - agreeable to PT. Patient on 2L O2 via nasal cannula upon arrival - 95-96% O2 saturation with O2 2L donned, including gait trials x 2 reps  Subjective  Subjective: Patient denies pain, reports \"feeling a little better, still a little weak and unsteady\"    Cognition   Orientation  Orientation Level: Oriented X4  Cognition  Following Commands: Follows one step commands with increased time; Follows one step commands with repetition  Safety Judgement: Decreased awareness of need for safety  Insights: Decreased awareness of deficits     Objective    Bed mobility  Supine to Sit: Minimal assistance (with elevated head of bed, use of rail, increased time to perform)  Scooting: Contact guard assistance (seated at edge of bed)  Transfers  Sit to Stand: Minimal Assistance (to RW - cues for hand placement and sequencing)  Stand to sit: Contact guard assistance (from RW - cues for hand placement and sequencing)  Stand Pivot Transfers: Contact guard assistance (with RW)  Comment: min A sit <-> stand and stand pivot without assistive device  Ambulation  Surface: level tile  Device: Rolling Walker  Other Apparatus: O2 (2L O2 via nasal cannula)  Assistance: Contact guard assistance  Quality of Gait: wide base of support, decreased (B) step length, foot clearance, and heel strike, fatigues easily  Distance: 20ft x 2 - seated rest in between trials  More Ambulation?: Yes  Ambulation 2  Surface - 2: level tile  Device 2: No device  Other Apparatus 2: O2 (2L O2 via nasal cannula x 1 rep, room air with RT present during 1 rep)  Assistance 2: Minimal assistance; Moderate assistance (intermittent mod A with turns secondary to posterior lean, stepping strategy to attempt to correct)  Quality of Gait 2: wide base of support, decreased (B) step length, foot clearance, and heel strike, fatigues easily  Distance: 20ft x 2  Comments: O2 decreased to 85% on room air during 2nd gait trial without RW with min to mod A     Balance  Posture: Fair (forward flexed, rounded shoulders)  Sitting - Static: Good  Sitting - Dynamic: Fair;+  Standing - Static: Fair  Standing - Dynamic: Fair;-       AM-PAC Score  AM-PAC Inpatient Mobility Raw Score : 13 (04/26/22 1242)  AM-PAC Inpatient T-Scale Score : 36.74 (04/26/22 1242)  Mobility Inpatient CMS 0-100% Score: 64.91 (04/26/22 1242)  Mobility Inpatient CMS G-Code Modifier : CL (04/26/22 1242)          Goals  Short Term Goals  Time Frame for Short term goals: Discharge - all goals ongoing 4/26  Short term goal 1: Pt will perform bed mobility with SPV  Short term goal 2: Pt will perform transfers sit <> stand with SPV and LRAD  Short term goal 3: Pt will perform ambulation x 150' with SBA and LRAD  Patient Goals   Patient goals : \"I guess I need to get stronger before I go back to my apartment\"       Therapy Time   Individual Concurrent Group Co-treatment   Time In 1110         Time Out 1148         Minutes 38                 Timed Code Treatment Minutes: 38 minutes    Total Treatment Minutes: 38 Minutes    If patient discharges prior to next treatment, this note will serve as discharge summary.     Caitlyn Pires PT, DPT #[id number]

## 2022-04-26 NOTE — CARE COORDINATION
Prior authorization request has been initiated via [facility].      Electronically signed by Phillip Dakins, RN on 4/26/2022 at 1:14 PM

## 2022-04-26 NOTE — DISCHARGE INSTR - COC
Continuity of Care Form    Patient Name: Emmie Oreilly   :  1925  MRN:  [id number]    Admit date:  2022  Discharge date:  2022    Code Status Order: Full Code   Advance Directives:      Admitting Physician:  No admitting provider for patient encounter.   PCP: Tessie Tran III, MD    Discharging Nurse: HESHAM Bautista  [facility] Unit/Room#: GNO-9741/9708-41  Discharging Unit Phone Number: [phone]    Emergency Contact:   Extended Emergency Contact Information  Primary Emergency Contact: Lenard Haynes, [address] Phone: [phone]  Relation: Child  Secondary Emergency Contact: Almaz Connors  Address: [address] [place] [address] Phone: [phone]  Mobile Phone: [phone]  Relation: Child    Past Surgical History:  Past Surgical History:   Procedure Laterality Date    ANKLE SURGERY      CATARACT REMOVAL  10-03    Right    COLONOSCOPY  10-02    Normal    KNEE SURGERY      Arthroscopic Right    REVISION TOTAL KNEE ARTHROPLASTY  09    Infected knee prosthesis removed, space placed(Right knee)    REVISION TOTAL KNEE ARTHROPLASTY  09    Right knee spacer removed and new joint inserted    SHOULDER SURGERY  02/09/10    Total Shoulder arthroplasty    TONSILLECTOMY      TOTAL KNEE ARTHROPLASTY  07    Right    TOTAL KNEE ARTHROPLASTY      Left       Immunization History:   Immunization History   Administered Date(s) Administered    COVID-19, Pfizer Purple top, DILUTE for use, 12+ yrs, 30mcg/0.3mL dose 2021    Influenza Whole 09/15/2009    Pneumococcal Conjugate 7-valent (Celena Driss) 10/22/1998    Td, unspecified formulation 2009    Zoster Live (Zostavax) 2010       Active Problems:  Patient Active Problem List   Diagnosis Code    Elevated prostate specific antigen (PSA) R97.20    Peripheral neuropathy G62.9    Type II or unspecified type diabetes mellitus without mention of complication, not stated as uncontrolled E11.9    Esophageal reflux K21.9    HTN (hypertension) I10    Lateral epicondylitis M77.10    Olecranon bursitis M70.20    Multifocal pneumonia J18.9    Type 2 diabetes mellitus (HCC) E11.9    Acute respiratory failure with hypoxia (HCC) J96.01       Isolation/Infection:   Isolation            No Isolation          Patient Infection Status       Infection Onset Added Last Indicated Last Indicated By Review Planned Expiration Resolved Resolved By    None active    Resolved    COVID-19 (Rule Out) 04/22/22 04/22/22 04/22/22 Respiratory Panel, Molecular, with COVID-19 (Restricted: peds pts or suitable admitted adults) (Ordered)   04/22/22 Rule-Out Test Resulted    COVID-19 (Rule Out) 04/20/22 04/20/22 04/21/22 COVID-19 & Influenza Combo (Ordered)   04/21/22 Rule-Out Test Resulted            Nurse Assessment:  Last Vital Signs: BP (!) 160/62   Pulse 61   Temp 98 °F (36.7 °C) (Temporal)   Resp 16   Ht 5' 11\" (1.803 m)   Wt 235 lb 14.3 oz (107 kg)   SpO2 94%   BMI 32.90 kg/m²     Last documented pain score (0-10 scale): Pain Level: 0  Last Weight:   Wt Readings from Last 1 Encounters:   04/26/22 235 lb 14.3 oz (107 kg)     Mental Status:  oriented and alert    IV Access:  - None    Nursing Mobility/ADLs:  Walking   Assisted  Transfer  Assisted  Bathing  Assisted  Dressing  Assisted  Toileting  Assisted  Feeding  Independent  Med Admin  Assisted  Med Delivery   whole    Wound Care Documentation and Therapy:        Elimination:  Continence:    Bowel: Yes  Bladder: Yes  Urinary Catheter: None   Colostomy/Ileostomy/Ileal Conduit: No       Date of Last BM: 04/26/22    Intake/Output Summary (Last 24 hours) at 4/26/2022 1016  Last data filed at 4/26/2022 0554  Gross per 24 hour   Intake --   Output 1175 ml   Net -1175 ml     I/O last 3 completed shifts:  In: -   Out: 2300 [Urine:2300]    Safety Concerns:     [employer] [place]    Impairments/Disabilities:      Hearing    Nutrition Therapy:  Current Nutrition Therapy:   - Oral Diet:  Dysphagia 3 advanced Soft bites no straws/thin liquids    Routes of Feeding: Oral  Liquids: Thin Liquids  Daily Fluid Restriction: no  Last Modified Barium Swallow with Video (Video Swallowing Test): 04/25/22  Treatments at the Time of Hospital Discharge:   Respiratory Treatments: 2 L/ N/C  Oxygen Therapy:  is on oxygen at 2 L/min per nasal cannula. Ventilator:    - No ventilator support    Rehab Therapies: Physical Therapy and Occupational Therapy  Weight Bearing Status/Restrictions: No weight bearing restrictions  Other Medical Equipment (for information only, NOT a DME order):  walker  Other Treatments: N/A    Patient's personal belongings (please select all that are sent with patient):  None    RN SIGNATURE:  Electronically signed by Caitlin Murray RN on 4/27/22 at 11:22 AM EDT    CASE MANAGEMENT/SOCIAL WORK SECTION    Inpatient Status Date: 04/21/2022    Readmission Risk Assessment Score: 12  Readmission Risk              Risk of Unplanned Readmission:  13           Discharging to Facility/ Agency       Discharge Facility: [facility]  Address: [address] [place] [address]  Phone: [phone]  Fax: [phone]      / signature: Mi Garcia, RN, BSN  [phone]       PHYSICIAN SECTION    Prognosis: Fair    Condition at Discharge: Stable    Rehab Potential (if transferring to Rehab): Fair    Recommended Labs or Other Treatments After Discharge: IV Rocephin and oral azithromycin, bladder scan as needed and straight cath for PVRs greater than 500 cc. Physician Certification: I certify the above information and transfer of Afshan Murillo  is necessary for the continuing treatment of the diagnosis listed and that he requires [facility] for greater 30 days.      Update Admission H&P: No change in H&P    PHYSICIAN SIGNATURE:  Electronically signed by Roddy Becker MD on 4/26/22 at 11:03 AM EDT

## 2022-04-26 NOTE — PROGRESS NOTES
Facility/Department: [facility] ICU  Speech Language Pathology   Dysphagia Treatment Note    Patient: Zachary Reed   : 1925   MRN: [id number]      Evaluation Date: 2022      Admitting Dx: Hypoxia [R09.02]  Pneumonia due to infectious organism, unspecified laterality, unspecified part of lung [J18.9]  Multifocal pneumonia [J18.9]  Treatment Diagnosis: Oropharyngeal Dysphagia   Pain: Denies                                              Diet and Treatment Recommendations 2022:  Diet Solids Recommendation:  Dysphagia III Soft and bite sized  Liquid Consistency Recommendation: Thin liquids  Recommended form of Meds: Meds in puree        Modified Barium Swallow completed 2022: Modified Barium Swallow evaluation completed on 2022. Patient presents with oropharyngeal dysphagia secondary to prolonged mastication, prolonged A-P bolus propulsion, premature bolus loss, minimally delayed swallow initiation, decreased tongue base retraction, decreased epiglottic ROM, decreased pharyngeal stripping wave and reduced pharyngoesophageal segment opening complicated by advanced age, comorbidities resulting in observed intermittent laryngeal penetration with thin liquids (chin tuck and neutral head posture) and collection of vallecular and pyriform stasis (soft solids, solids). No aspiration was viewed during this study. Patient is at an increased risk of aspiration due to intermittent laryngeal penetration with thin liquids. Previous MBS recommended chin tuck with thin liquids, trials of thins this date were presented with head in neutral position and with strategy of chin tuck. Intermittent laryngeal penetration was assessed during the swallow with both head postures, laryngeal penetration appeared to be due to delayed swallow initiation and decreased airway closure. After the swallow with thin liquids questionable retrograde flow was noted to the pyriform from esophagus.  Collection of vallecular residue was assessed with all textures (increased residue was noted with solids) this appeared to be due to decreased tongue base retraction, reduced epiglottic inversion and decreased pharyngoesophageal segment opening. Pt appeared to benefit from use of liquid wash or double swallow. He appeared to demonstrate decreased sensation for pharyngeal stasis. Following study throat clearing was noted question if this was due to laryngeal penetration, pharyngeal stasis or possible reflux. See below for recommendations. Compensatory strategies: Alternate solids/liquids , Upright as possible with all PO intake , Small bites/sips , Swallow 2 times per bite , Eat/feed slowly, Remain upright 30-45 min , Cough/re-swallow     Assessment of Texture Tolerance:  Diet level prior to treatment: Dysphagia III Soft and bite sized  with Thin liquids   Tolerance of Current Diet Level:Per chart, no noted difficulty with current diet level     Impressions: Pt was positioned Upright in chair, awake and alert. Currently on 2L O2 via nasal cannula . Son was present for session. Extensive education was provided re; results of Modified Barium Swallow (video reviewed with son in PACS), diet recommendations, appropriate compensatory strategies and recommendations for ongoing therapy. Handouts were provided with recommendations. Pt demonstrates increased risk for aspiration based on results of Modified Barium Swallow. Dysphagia Goals:   1. Pt will functionally tolerate recommended diet level with use of recommended compensatory strategies with no s/s of aspiration/penetration (ongoing 4/26/2022)   2. Pt/family will demonstrate understanding of results Modified Barium Swallow Study, risk for aspiration, rationale for diet level and compensatory strategies (ongoing 4/26/2022)   3.  Pt will demonstrate improved sensory motor function via targeted exercises and appropriate treatment modalities (ongoing 4/26/2022)     Plan:   3-5 times per week during

## 2022-04-27 VITALS
TEMPERATURE: 98.3 F | HEART RATE: 70 BPM | WEIGHT: 235.89 LBS | DIASTOLIC BLOOD PRESSURE: 82 MMHG | SYSTOLIC BLOOD PRESSURE: 167 MMHG | BODY MASS INDEX: 33.02 KG/M2 | OXYGEN SATURATION: 94 % | RESPIRATION RATE: 18 BRPM | HEIGHT: 71 IN

## 2022-04-27 LAB
ANION GAP SERPL CALCULATED.3IONS-SCNC: 9 MMOL/L (ref 3–16)
BUN BLDV-MCNC: 16 MG/DL (ref 7–20)
CALCIUM SERPL-MCNC: 9.5 MG/DL (ref 8.3–10.6)
CHLORIDE BLD-SCNC: 101 MMOL/L (ref 99–110)
CO2: 32 MMOL/L (ref 21–32)
CREAT SERPL-MCNC: 0.9 MG/DL (ref 0.8–1.3)
GFR AFRICAN AMERICAN: >60
GFR NON-AFRICAN AMERICAN: >60
GLUCOSE BLD-MCNC: 107 MG/DL (ref 70–99)
GLUCOSE BLD-MCNC: 114 MG/DL (ref 70–99)
HCT VFR BLD CALC: 38.4 % (ref 40.5–52.5)
HEMOGLOBIN: 12.3 G/DL (ref 13.5–17.5)
MCH RBC QN AUTO: 27.7 PG (ref 26–34)
MCHC RBC AUTO-ENTMCNC: 32.1 G/DL (ref 31–36)
MCV RBC AUTO: 86.4 FL (ref 80–100)
PDW BLD-RTO: 16.2 % (ref 12.4–15.4)
PERFORMED ON: ABNORMAL
PLATELET # BLD: 182 K/UL (ref 135–450)
PMV BLD AUTO: 7.6 FL (ref 5–10.5)
POTASSIUM SERPL-SCNC: 4 MMOL/L (ref 3.5–5.1)
RBC # BLD: 4.45 M/UL (ref 4.2–5.9)
SODIUM BLD-SCNC: 142 MMOL/L (ref 136–145)
WBC # BLD: 3.7 K/UL (ref 4–11)

## 2022-04-27 PROCEDURE — 94760 N-INVAS EAR/PLS OXIMETRY 1: CPT

## 2022-04-27 PROCEDURE — 2700000000 HC OXYGEN THERAPY PER DAY

## 2022-04-27 PROCEDURE — 6370000000 HC RX 637 (ALT 250 FOR IP): Performed by: INTERNAL MEDICINE

## 2022-04-27 PROCEDURE — 85027 COMPLETE CBC AUTOMATED: CPT

## 2022-04-27 PROCEDURE — 6370000000 HC RX 637 (ALT 250 FOR IP): Performed by: NURSE PRACTITIONER

## 2022-04-27 PROCEDURE — 2580000003 HC RX 258: Performed by: INTERNAL MEDICINE

## 2022-04-27 PROCEDURE — 36415 COLL VENOUS BLD VENIPUNCTURE: CPT

## 2022-04-27 PROCEDURE — 6360000002 HC RX W HCPCS: Performed by: INTERNAL MEDICINE

## 2022-04-27 PROCEDURE — 80048 BASIC METABOLIC PNL TOTAL CA: CPT

## 2022-04-27 RX ORDER — LOSARTAN POTASSIUM 25 MG/1
25 TABLET ORAL DAILY
Qty: 90 TABLET | Refills: 1 | Status: ON HOLD
Start: 2022-04-27 | End: 2022-08-02 | Stop reason: HOSPADM

## 2022-04-27 RX ORDER — AMLODIPINE BESYLATE 5 MG/1
10 TABLET ORAL DAILY
Status: DISCONTINUED | OUTPATIENT
Start: 2022-04-27 | End: 2022-04-27 | Stop reason: HOSPADM

## 2022-04-27 RX ORDER — AMLODIPINE BESYLATE 10 MG/1
10 TABLET ORAL DAILY
Qty: 30 TABLET | Refills: 3
Start: 2022-04-28

## 2022-04-27 RX ADMIN — Medication 10 ML: at 09:36

## 2022-04-27 RX ADMIN — DOXAZOSIN 4 MG: 4 TABLET ORAL at 09:35

## 2022-04-27 RX ADMIN — AZITHROMYCIN MONOHYDRATE 500 MG: 250 TABLET ORAL at 09:35

## 2022-04-27 RX ADMIN — AMLODIPINE BESYLATE 10 MG: 5 TABLET ORAL at 09:35

## 2022-04-27 RX ADMIN — FINASTERIDE 5 MG: 5 TABLET, FILM COATED ORAL at 09:35

## 2022-04-27 RX ADMIN — Medication 1 CAPSULE: at 09:35

## 2022-04-27 RX ADMIN — PANTOPRAZOLE SODIUM 40 MG: 40 TABLET, DELAYED RELEASE ORAL at 06:00

## 2022-04-27 RX ADMIN — ENOXAPARIN SODIUM 40 MG: 100 INJECTION SUBCUTANEOUS at 09:34

## 2022-04-27 RX ADMIN — Medication 1000 MG: at 01:06

## 2022-04-27 RX ADMIN — GABAPENTIN 400 MG: 400 CAPSULE ORAL at 09:35

## 2022-04-27 ASSESSMENT — PAIN SCALES - WONG BAKER
WONGBAKER_NUMERICALRESPONSE: 0

## 2022-04-27 ASSESSMENT — PAIN SCALES - GENERAL: PAINLEVEL_OUTOF10: 0

## 2022-04-27 NOTE — CARE COORDINATION
[facility] website shows approval for snf. Auth #- [id number]    [facility] had transport arranged for noon today. CM notified pt and called his son in [facility] while in room and he is aware of transport time. CM spoke to [address] at [facility] [phone] and she is also aware of transport. RN notified.       Mi Garcia, RN, BSN  [phone]

## 2022-04-27 NOTE — PROGRESS NOTES
[facility]    4/27/2022 11:57 AM        Name: Bev Bill . Admitted: 4/20/2022  Primary Care Provider: Jacques Marcano MD (Tel: [phone])      Chief Complaint   Patient presents with    Shortness of Breath     pt with tremors that started today in hands and feet after starting new med, pt also was 86% on RA. Placed on NRB and at 97%. [place] ems from home. Hospital Course: Patient is a 79 yo male with hx BPH, GERD, HTN, HLD, DM2. He presented to hospital with malaise, weakness, chills. Noted to be hypoxic in ER with saturation 86% on room air. CXR with multifocal infiltrates, CT pulmonary negative for PE but also shows multifocal pneumonia. He was admitted and started on IV antibiotics. Subsequently transferred to unit secondary to somnolence and hypercapnia (72). He was treated with bipap, seroquel believed to be contributing factor. He is back to baseline. He had escobar inserted for urinary retention, removed 4/24. Subjective:  Patient seen and examined. No interval events. Denies any complaints. No chest pain, SOB, fever or chills.        Reviewed interval ancillary notes    Current Medications  amLODIPine (NORVASC) tablet 10 mg, Daily  lidocaine (XYLOCAINE) 2 % uro-jet, PRN  finasteride (PROSCAR) tablet 5 mg, Daily  lactobacillus (CULTURELLE) capsule 1 capsule, BID WC  azithromycin (ZITHROMAX) tablet 500 mg, Daily  gabapentin (NEURONTIN) capsule 400 mg, 4x Daily  doxazosin (CARDURA) tablet 4 mg, Daily  pantoprazole (PROTONIX) tablet 40 mg, BID AC  sodium chloride flush 0.9 % injection 5-40 mL, 2 times per day  sodium chloride flush 0.9 % injection 5-40 mL, PRN  0.9 % sodium chloride infusion, PRN  enoxaparin (LOVENOX) injection 40 mg, Daily  ondansetron (ZOFRAN-ODT) disintegrating tablet 4 mg, Q8H PRN   Or  ondansetron (ZOFRAN) injection 4 mg, Q6H PRN  polyethylene glycol [facility]) packet 17 g, Daily PRN  acetaminophen (TYLENOL) tablet 650 mg, Q6H PRN   Or  acetaminophen (TYLENOL) suppository 650 mg, Q6H PRN  albuterol (PROVENTIL) nebulizer solution 2.5 mg, Q4H PRN  cefTRIAXone (ROCEPHIN) 1000 mg in sterile water 10 mL IV syringe, Q24H  glucose (GLUTOSE) 40 % oral gel 15 g, PRN  dextrose 50 % IV solution, PRN  glucagon (rDNA) injection 1 mg, PRN  dextrose 5 % solution, PRN  insulin lispro (HUMALOG) injection vial 0-12 Units, TID WC  insulin lispro (HUMALOG) injection vial 0-6 Units, Nightly      Objective:  BP (!) 167/82   Pulse 70   Temp 98.3 °F (36.8 °C) (Oral)   Resp 18   Ht 5' 11\" (1.803 m)   Wt 235 lb 14.3 oz (107 kg)   SpO2 94%   BMI 32.90 kg/m²     Intake/Output Summary (Last 24 hours) at 4/27/2022 1157  Last data filed at 4/27/2022 1023  Gross per 24 hour   Intake 1390 ml   Output 200 ml   Net 1190 ml      Wt Readings from Last 3 Encounters:   04/26/22 235 lb 14.3 oz (107 kg)   04/23/18 210 lb (95.3 kg)   02/02/15 200 lb (90.7 kg)     General:  Awake, alert, oriented in NAD  Skin:  Warm and dry. No unusual bruising or rash  Neck:  Supple. No JVD appreciated  Chest:  Normal effort. Clear to auscultation, no wheezes/rhonchi/rales  Cardiovascular:  RRR, normal S1/S2, no murmur/gallop/rub  Abdomen:  Soft, nontender, +bowel sounds  Extremities:  No edema  Neurological: No focal deficits  Psychological: Normal mood and affect      Labs and Tests:  CBC:   Recent Labs     04/25/22 0418 04/26/22  0336 04/27/22  0616   WBC 3.9* 3.6* 3.7*   HGB 13.0* 12.5* 12.3*    173 182     BMP:    Recent Labs     04/25/22 0418 04/26/22  0336 04/27/22  0616    143 142   K 4.0 3.8 4.0   CL 99 102 101   CO2 33* 31 32   BUN 18 19 16   CREATININE 0.9 0.9 0.9   GLUCOSE 117* 104* 114*     Hepatic:   No results for input(s): AST, ALT, ALB, BILITOT, ALKPHOS in the last 72 hours. Results for Pennie Cline (MRN [id number]) as of 4/25/2022 10:04   Ref.  Range 4/24/2022 08:31 4/24/2022 12:15 4/24/2022 16:21 4/24/2022 19:41 4/25/2022 08:26   POC Glucose Latest Ref Range: 70 - 99 mg/dl 113 (H) 112 (H) 131 (H) 163 (H) 120 (H)       CXR 4/21/2022:  Mild cardiomegaly and mild central pulmonary congestion with hazy left   perihilar opacity extending inferiorly which could be due to atypical   pulmonary edema and/or early pneumonia. CT Pulmonary 4/21/2022:  1. Limited exam, though no pulmonary emboli seen through the lobar branches. 2. Patchy consolidative airspace disease seen in the lingula and bilateral   lower lobes, left greater than right felt to represent multifocal pneumonia. No reactive effusion. 3. Cardiomegaly, with coronary artery atherosclerotic disease and minimal   aortic valvular calcification. 4. Mildly enlarged precarinal lymph node felt likely reactive.  No bulky   lymphadenopathy in the chest.   5. Multilevel degenerative changes are seen in the spine, with diffuse   osteopenia.  No acute fracture. Problem List  Principal Problem:    Multifocal pneumonia  Active Problems:    Type 2 diabetes mellitus (HCC)    Acute respiratory failure with hypoxia and hypercapnia (HCC)    Debility    Urinary retention    Esophageal reflux    HTN (hypertension)  Resolved Problems:    * No resolved hospital problems. *       Assessment & Plan:   1. Multifocal pneumonia:  Presented with malaise and chills. CXR and CT scan with multifocal infiltrates. COVID-19 and influenza screen were negative. WBC 11.2->3.9, procal 0.31 on admit. He received IV ceftriaxone and levofloxacin in ER, started on ceftriaxone and po azithromycin on admission to complete a 7 day course. Blood cultures with NGTD. Continue bronchodilators. 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 Respiratory panel (4/23) negative. Remained afebrile with no leukocytosis. Pulmonary consulted; did not recommend any NIPPV on discharge.         2.  Acute  on chronic respiratory failure with hypoxia and hypercapnia due to multifocal pneumonia:  Noted to have O2 sat 86% on room air at admission with increased work of breathing. O2 sats currently mid 90s on 2 liters. Goal FiO2 above 92%. Patient on 2 L NC at home.     3. Somnolence / hypercapnia:  Resolved. Suspect secondary to sedation from [employer]  Improved with bipap, pCO2 66.2->72.6->56.4. Ammonia level 38.   Pulmonary does not suspect he will need bipap upon discharge.      4. DM2, controlled. A1c 6.5%:  Not on hypoglycemic agents at home.     5. GERD: Continue PPI. 6. HTN: Will need monitoring on Losartan, amlodipine & doxazosin.     7. Urinary retention / hematuria:  Patient straight cathed for bladder scan 636 ml, escobar subsequently inserted. Suspect hematuria from [address] cath placement. Escobar removed 4/25, patient has been voiding. Urology consulted and recommended recommended allowing for larger residuals given age and lack of symptoms, straight cath for PVR > 500 mls.   Continue Proscar and doxazosin. Outpatient follow-up with urology.      8. Debility:  Secondary to acute illness. Evaluated by PT/OT and rehab recommended      Disposition: SNF        Diet: ADULT DIET; Dysphagia - Soft and Bite Sized;  No Drinking Straws  Code:Full Code  DVT PPX: enoxaparin      Carolyn Cadena MD   4/27/2022 11:57 AM

## 2022-04-27 NOTE — CARE COORDINATION
Discharge Plan:     Patient discharged to: [facility]   SW/DC Planner faxed, [address] and EDDY TD:[phone]  Narcotic Prescriptions faxed were:N/A  RN: Shelby Alfred  will call report to:  [phone]    Medical Transport with: [facility] [phone]   time:noon  Family advised of discharge?:yes   HENS Submitted?:  Yes   All discharge needs met per case management.     Nurys Aldridge RN, BSN  [phone]

## 2022-04-27 NOTE — PROGRESS NOTES
VSS - afebrile. Pt is alert and oriented x 4 with no history of falls. Assessment completed as charted. Bed is in lowest position with 2/4 bed rails raised, bed alarm turned on, wheels locked and call light within reach - patient wearing non-skid socks and verbalizes understanding to call out for assistance. No further requests at this time. Will continue to monitor.      Vitals:    04/26/22 2143   BP: (!) 163/72   Pulse: 66   Resp: 18   Temp: 98.3 °F (36.8 °C)   SpO2: 90%

## 2022-04-27 NOTE — PROGRESS NOTES
Physician Progress Note      Elly Alcocer  CSN #:                  [id number]  :                       1925  ADMIT DATE:       2022 8:13 PM  [address] [place] DATE:        2022 12:15 PM  RESPONDING  PROVIDER #:        Lynda Hilliard          QUERY TEXT:    Pt admitted with Multifocal Pneumonia. If possible, please document in the   progress notes and discharge summary if you are evaluating and/or treating any   of the following:    Note: CAP and HCAP indicate where the pneumonia was acquired, not a specific   type. The medical record reflects the following:  Risk Factors: Age, Multifocal PNA, ARF w/Hypoxia & Hypercapnia, Dysphagia,   GERD, DM2, HTN  Clinical Indicators: Per DC Summary 22 \"Multifocal pneumonia:  Presented with malaise and chills. CXR and CT scan with multifocal   infiltrates. COVID-19 and influenza screen were negative. WBC 11.2->3.9,   procal 0.31 on admit. He received IV ceftriaxone and levofloxacin in ER,   started on ceftriaxone and po azithromycin on admission to complete a 7 day   course. \"  Per Pulmonary note 22 \"Patient was noted to have multifocal   infiltrates in the lower lobes, left more than right. Likely aspiration   pneumonia. \"  Treatment: Pulmonary/Urology consults, po Zithromax, iv Rocephin,  iv Levaquin   x1, Cxr, CT Chest/PE, monitor resp. status & labs  Options provided:  -- Possible Gram negative pneumonia  -- Aspiration pneumonia  -- Other - I will add my own diagnosis  -- Disagree - Not applicable / Not valid  -- Disagree - Clinically unable to determine / Unknown  -- Refer to Clinical Documentation Reviewer    PROVIDER RESPONSE TEXT:    This patient has Possible gram negative pneumonia.     Query created by: Maria Alejandra Shin on 2022 11:56 AM      Electronically signed by:  Lynda Hilliard 2022 4:44 PM

## 2022-04-27 NOTE — PLAN OF CARE
Problem: ABCDS Injury Assessment  Goal: Absence of physical injury  Outcome: Progressing     Problem: Safety - Adult  Goal: Free from fall injury  Outcome: Progressing     Problem: Confusion  Goal: Confusion, delirium, dementia, or psychosis is improved or at baseline  Description: INTERVENTIONS:  1. Assess for possible contributors to thought disturbance, including medications, impaired vision or hearing, underlying metabolic abnormalities, dehydration, psychiatric diagnoses, and notify attending LIP  2. [place] high risk fall precautions, as indicated  3. Provide frequent short contacts to provide reality reorientation, refocusing and direction  4. Decrease environmental stimuli, including noise as appropriate  5. Monitor and intervene to maintain adequate nutrition, hydration, elimination, sleep and activity  6. If unable to ensure safety without constant attention obtain sitter and review sitter guidelines with assigned personnel  7. Initiate Psychosocial CNS and Spiritual Care consult, as indicated  Outcome: Progressing     Problem: Pain  Goal: Verbalizes/displays adequate comfort level or baseline comfort level  Outcome: Progressing     Problem: Chronic Conditions and Co-morbidities  Goal: Patient's chronic conditions and co-morbidity symptoms are monitored and maintained or improved  Outcome: Progressing     Problem: Discharge Planning  Goal: Discharge to home or other facility with appropriate resources  Outcome: Progressing     Problem: Coping  Goal: Pt/Family able to verbalize concerns and demonstrate effective coping strategies  Description: INTERVENTIONS:  1. Assist patient/family to identify coping skills, available support systems and cultural and spiritual values  2. Provide emotional support, including active listening and acknowledgement of concerns of patient and caregivers  3. Reduce environmental stimuli, as able  4. Instruct patient/family in relaxation techniques, as appropriate  5.  Assess for spiritual pain/suffering and initiate Spiritual Care, Psychosocial Clinical Specialist consults as needed  Outcome: Progressing     Problem: Decision Making  Goal: Pt/Family able to effectively weigh alternatives and participate in decision making related to treatment and care  Description: INTERVENTIONS:  1. Determine when there are differences between patient's view, family's view, and healthcare provider's view of condition  2. Facilitate patient and family articulation of goals for care  3. Help patient and family identify pros/cons of alternative solutions  4. Provide information as requested by patient/family  5. Respect patient/family right to receive or not to receive information  6. Serve as a liaison between patient and family and health care team  7. Initiate Consults from Ethics, Palliative Care or initiate [address] [place] [address] as is appropriate  Outcome: Progressing     Problem: Behavior  Goal: Pt/Family maintain appropriate behavior and adhere to behavioral management agreement, if implemented  Description: INTERVENTIONS:  1. Assess patient/family's coping skills and  non-compliant behavior (including use of illegal substances)  2. Notify security of behavior or suspected illegal substances which indicate the need for search of the patient and/or belongings  3. Encourage verbalization of thoughts and concerns in a socially appropriate manner  4. Utilize positive, consistent limit setting strategies supporting safety of patient, staff and others  5. Encourage participation in the decision making process about the behavioral management agreement  6. Implement a Health Care Agreement if patient meets criteria  7. If a patient's behavior jeopardizes the safety of the patient, staff, or others refer to organization policy. If a visitor's behavior poses a threat to safety call refer to organization policy.   8. Initiate consult with , Psychosocial CNS, Spiritual Care as appropriate  Outcome: Progressing     Problem: Skin/Tissue Integrity  Goal: Absence of new skin breakdown  Description: 1. Monitor for areas of redness and/or skin breakdown  2. Assess vascular access sites hourly  3. Every 4-6 hours minimum:  Change oxygen saturation probe site  4. Every 4-6 hours:  If on nasal continuous positive airway pressure, respiratory therapy assess nares and determine need for appliance change or resting period.   Outcome: Progressing

## 2022-05-25 ENCOUNTER — HOSPITAL ENCOUNTER (OUTPATIENT)
Age: 87
Discharge: HOME | End: 2022-05-25
Payer: MEDICARE

## 2022-05-25 ENCOUNTER — HOSPITAL ENCOUNTER (OUTPATIENT)
Dept: GENERAL RADIOLOGY | Age: 87
Discharge: HOME | End: 2022-05-25
Payer: MEDICARE

## 2022-05-25 DIAGNOSIS — J69.0 PNEUMONITIS DUE TO INHALATION OF FOOD OR VOMITUS (HCC): ICD-10-CM

## 2022-05-25 PROCEDURE — 71046 X-RAY EXAM CHEST 2 VIEWS: CPT

## 2022-06-02 LAB
EKG ATRIAL RATE: 71 BPM
EKG DIAGNOSIS: NORMAL
EKG P AXIS: -22 DEGREES
EKG P-R INTERVAL: 254 MS
EKG Q-T INTERVAL: 386 MS
EKG QRS DURATION: 114 MS
EKG QTC CALCULATION (BAZETT): 419 MS
EKG R AXIS: -46 DEGREES
EKG T AXIS: 72 DEGREES
EKG VENTRICULAR RATE: 71 BPM

## 2022-06-02 PROCEDURE — 93010 ELECTROCARDIOGRAM REPORT: CPT | Performed by: INTERNAL MEDICINE

## 2022-06-12 ENCOUNTER — HOSPITAL ENCOUNTER (INPATIENT)
Age: 87
LOS: 3 days | Discharge: HOME | DRG: 191 | End: 2022-06-15
Attending: STUDENT IN AN ORGANIZED HEALTH CARE EDUCATION/TRAINING PROGRAM | Admitting: INTERNAL MEDICINE
Payer: MEDICARE

## 2022-06-12 ENCOUNTER — APPOINTMENT (OUTPATIENT)
Dept: GENERAL RADIOLOGY | Age: 87
DRG: 191 | End: 2022-06-12
Payer: MEDICARE

## 2022-06-12 DIAGNOSIS — T17.908A ASPIRATION INTO AIRWAY, INITIAL ENCOUNTER: ICD-10-CM

## 2022-06-12 DIAGNOSIS — J98.01 BRONCHOSPASM: ICD-10-CM

## 2022-06-12 DIAGNOSIS — R06.2 WHEEZING: Primary | ICD-10-CM

## 2022-06-12 DIAGNOSIS — R06.89 HYPERCAPNIA: ICD-10-CM

## 2022-06-12 PROBLEM — J44.1 ACUTE EXACERBATION OF CHRONIC OBSTRUCTIVE PULMONARY DISEASE (COPD) (HCC): Status: ACTIVE | Noted: 2022-06-12

## 2022-06-12 LAB
A/G RATIO: 1.2 (ref 1.1–2.2)
ALBUMIN SERPL-MCNC: 4 G/DL (ref 3.4–5)
ALP BLD-CCNC: 77 U/L (ref 40–129)
ALT SERPL-CCNC: 13 U/L (ref 10–40)
ANION GAP SERPL CALCULATED.3IONS-SCNC: 8 MMOL/L (ref 3–16)
AST SERPL-CCNC: 22 U/L (ref 15–37)
BACTERIA: NORMAL /HPF
BASE EXCESS VENOUS: 6.7 MMOL/L (ref -3–3)
BASOPHILS ABSOLUTE: 0.1 K/UL (ref 0–0.2)
BASOPHILS RELATIVE PERCENT: 0.5 %
BILIRUB SERPL-MCNC: 0.5 MG/DL (ref 0–1)
BILIRUBIN URINE: NEGATIVE
BLOOD, URINE: NEGATIVE
BUN BLDV-MCNC: 14 MG/DL (ref 7–20)
CALCIUM SERPL-MCNC: 9.6 MG/DL (ref 8.3–10.6)
CARBOXYHEMOGLOBIN: 2.5 % (ref 0–1.5)
CHLORIDE BLD-SCNC: 101 MMOL/L (ref 99–110)
CLARITY: CLEAR
CO2: 33 MMOL/L (ref 21–32)
COLOR: ABNORMAL
CREAT SERPL-MCNC: 1.1 MG/DL (ref 0.8–1.3)
EOSINOPHILS ABSOLUTE: 0.1 K/UL (ref 0–0.6)
EOSINOPHILS RELATIVE PERCENT: 0.5 %
EPITHELIAL CELLS, UA: 1 /HPF (ref 0–5)
GFR AFRICAN AMERICAN: >60
GFR NON-AFRICAN AMERICAN: >60
GLUCOSE BLD-MCNC: 131 MG/DL (ref 70–99)
GLUCOSE BLD-MCNC: 180 MG/DL (ref 70–99)
GLUCOSE URINE: NEGATIVE MG/DL
HCO3 VENOUS: 34.1 MMOL/L (ref 23–29)
HCT VFR BLD CALC: 38.5 % (ref 40.5–52.5)
HEMOGLOBIN: 12.4 G/DL (ref 13.5–17.5)
HYALINE CASTS: 2 /LPF (ref 0–8)
KETONES, URINE: NEGATIVE MG/DL
LEUKOCYTE ESTERASE, URINE: ABNORMAL
LYMPHOCYTES ABSOLUTE: 1.6 K/UL (ref 1–5.1)
LYMPHOCYTES RELATIVE PERCENT: 14.7 %
MCH RBC QN AUTO: 28.6 PG (ref 26–34)
MCHC RBC AUTO-ENTMCNC: 32.1 G/DL (ref 31–36)
MCV RBC AUTO: 89.2 FL (ref 80–100)
METHEMOGLOBIN VENOUS: 0.1 %
MICROSCOPIC EXAMINATION: YES
MONOCYTES ABSOLUTE: 0.6 K/UL (ref 0–1.3)
MONOCYTES RELATIVE PERCENT: 6.1 %
NEUTROPHILS ABSOLUTE: 8.3 K/UL (ref 1.7–7.7)
NEUTROPHILS RELATIVE PERCENT: 78.2 %
NITRITE, URINE: NEGATIVE
O2 CONTENT, VEN: 18 VOL %
O2 SAT, VEN: >100 %
O2 THERAPY: ABNORMAL
PCO2, VEN: 60.8 MMHG (ref 40–50)
PDW BLD-RTO: 17 % (ref 12.4–15.4)
PERFORMED ON: ABNORMAL
PH UA: 5.5 (ref 5–8)
PH VENOUS: 7.36 (ref 7.35–7.45)
PLATELET # BLD: 179 K/UL (ref 135–450)
PMV BLD AUTO: 8 FL (ref 5–10.5)
PO2, VEN: 146 MMHG (ref 25–40)
POTASSIUM SERPL-SCNC: 4 MMOL/L (ref 3.5–5.1)
PROCALCITONIN: 0.1 NG/ML (ref 0–0.15)
PROTEIN UA: 30 MG/DL
RBC # BLD: 4.32 M/UL (ref 4.2–5.9)
RBC UA: 0 /HPF (ref 0–4)
SODIUM BLD-SCNC: 142 MMOL/L (ref 136–145)
SPECIFIC GRAVITY UA: 1.01 (ref 1–1.03)
TCO2 CALC VENOUS: 81 MMOL/L
TOTAL CK: 91 U/L (ref 39–308)
TOTAL PROTEIN: 7.3 G/DL (ref 6.4–8.2)
TROPONIN: <0.01 NG/ML
URINE REFLEX TO CULTURE: ABNORMAL
URINE TYPE: ABNORMAL
UROBILINOGEN, URINE: 0.2 E.U./DL
WBC # BLD: 10.7 K/UL (ref 4–11)
WBC UA: 1 /HPF (ref 0–5)

## 2022-06-12 PROCEDURE — 93005 ELECTROCARDIOGRAM TRACING: CPT | Performed by: PHYSICIAN ASSISTANT

## 2022-06-12 PROCEDURE — 84145 PROCALCITONIN (PCT): CPT

## 2022-06-12 PROCEDURE — 2580000003 HC RX 258: Performed by: STUDENT IN AN ORGANIZED HEALTH CARE EDUCATION/TRAINING PROGRAM

## 2022-06-12 PROCEDURE — 6370000000 HC RX 637 (ALT 250 FOR IP): Performed by: INTERNAL MEDICINE

## 2022-06-12 PROCEDURE — 96374 THER/PROPH/DIAG INJ IV PUSH: CPT

## 2022-06-12 PROCEDURE — 82550 ASSAY OF CK (CPK): CPT

## 2022-06-12 PROCEDURE — 81001 URINALYSIS AUTO W/SCOPE: CPT

## 2022-06-12 PROCEDURE — 71045 X-RAY EXAM CHEST 1 VIEW: CPT

## 2022-06-12 PROCEDURE — 80053 COMPREHEN METABOLIC PANEL: CPT

## 2022-06-12 PROCEDURE — 85025 COMPLETE CBC W/AUTO DIFF WBC: CPT

## 2022-06-12 PROCEDURE — 6370000000 HC RX 637 (ALT 250 FOR IP): Performed by: NURSE PRACTITIONER

## 2022-06-12 PROCEDURE — 1200000000 HC SEMI PRIVATE

## 2022-06-12 PROCEDURE — 82803 BLOOD GASES ANY COMBINATION: CPT

## 2022-06-12 PROCEDURE — 84484 ASSAY OF TROPONIN QUANT: CPT

## 2022-06-12 PROCEDURE — 99285 EMERGENCY DEPT VISIT HI MDM: CPT

## 2022-06-12 PROCEDURE — 2700000000 HC OXYGEN THERAPY PER DAY

## 2022-06-12 PROCEDURE — 94640 AIRWAY INHALATION TREATMENT: CPT

## 2022-06-12 PROCEDURE — 36415 COLL VENOUS BLD VENIPUNCTURE: CPT

## 2022-06-12 PROCEDURE — 6360000002 HC RX W HCPCS: Performed by: STUDENT IN AN ORGANIZED HEALTH CARE EDUCATION/TRAINING PROGRAM

## 2022-06-12 PROCEDURE — 2580000003 HC RX 258: Performed by: INTERNAL MEDICINE

## 2022-06-12 RX ORDER — SODIUM CHLORIDE 9 MG/ML
INJECTION, SOLUTION INTRAVENOUS PRN
Status: DISCONTINUED | OUTPATIENT
Start: 2022-06-12 | End: 2022-06-15 | Stop reason: HOSPADM

## 2022-06-12 RX ORDER — METHYLPREDNISOLONE SODIUM SUCCINATE 125 MG/2ML
60 INJECTION, POWDER, LYOPHILIZED, FOR SOLUTION INTRAMUSCULAR; INTRAVENOUS ONCE
Status: COMPLETED | OUTPATIENT
Start: 2022-06-12 | End: 2022-06-12

## 2022-06-12 RX ORDER — ACETAMINOPHEN 325 MG/1
650 TABLET ORAL EVERY 6 HOURS PRN
Status: DISCONTINUED | OUTPATIENT
Start: 2022-06-12 | End: 2022-06-15 | Stop reason: HOSPADM

## 2022-06-12 RX ORDER — METHYLPREDNISOLONE SODIUM SUCCINATE 40 MG/ML
40 INJECTION, POWDER, LYOPHILIZED, FOR SOLUTION INTRAMUSCULAR; INTRAVENOUS DAILY
Status: DISCONTINUED | OUTPATIENT
Start: 2022-06-13 | End: 2022-06-15

## 2022-06-12 RX ORDER — LANOLIN ALCOHOL/MO/W.PET/CERES
3 CREAM (GRAM) TOPICAL NIGHTLY PRN
Status: DISCONTINUED | OUTPATIENT
Start: 2022-06-12 | End: 2022-06-15 | Stop reason: HOSPADM

## 2022-06-12 RX ORDER — AMLODIPINE BESYLATE 5 MG/1
10 TABLET ORAL DAILY
Status: DISCONTINUED | OUTPATIENT
Start: 2022-06-13 | End: 2022-06-15 | Stop reason: HOSPADM

## 2022-06-12 RX ORDER — SODIUM CHLORIDE 0.9 % (FLUSH) 0.9 %
5-40 SYRINGE (ML) INJECTION PRN
Status: DISCONTINUED | OUTPATIENT
Start: 2022-06-12 | End: 2022-06-15 | Stop reason: HOSPADM

## 2022-06-12 RX ORDER — GABAPENTIN 100 MG/1
100 CAPSULE ORAL 4 TIMES DAILY
Status: DISCONTINUED | OUTPATIENT
Start: 2022-06-12 | End: 2022-06-15 | Stop reason: HOSPADM

## 2022-06-12 RX ORDER — FINASTERIDE 5 MG/1
5 TABLET, FILM COATED ORAL DAILY
Status: DISCONTINUED | OUTPATIENT
Start: 2022-06-13 | End: 2022-06-15 | Stop reason: HOSPADM

## 2022-06-12 RX ORDER — IPRATROPIUM BROMIDE AND ALBUTEROL SULFATE 2.5; .5 MG/3ML; MG/3ML
1 SOLUTION RESPIRATORY (INHALATION)
Status: DISCONTINUED | OUTPATIENT
Start: 2022-06-12 | End: 2022-06-15

## 2022-06-12 RX ORDER — SODIUM CHLORIDE 0.9 % (FLUSH) 0.9 %
5-40 SYRINGE (ML) INJECTION EVERY 12 HOURS SCHEDULED
Status: DISCONTINUED | OUTPATIENT
Start: 2022-06-12 | End: 2022-06-15 | Stop reason: HOSPADM

## 2022-06-12 RX ORDER — GAUZE BANDAGE 2" X 2"
100 BANDAGE TOPICAL DAILY
Status: DISCONTINUED | OUTPATIENT
Start: 2022-06-13 | End: 2022-06-15 | Stop reason: HOSPADM

## 2022-06-12 RX ORDER — INSULIN LISPRO 100 [IU]/ML
0-6 INJECTION, SOLUTION INTRAVENOUS; SUBCUTANEOUS
Status: DISCONTINUED | OUTPATIENT
Start: 2022-06-13 | End: 2022-06-15 | Stop reason: HOSPADM

## 2022-06-12 RX ORDER — DEXTROSE MONOHYDRATE 50 MG/ML
100 INJECTION, SOLUTION INTRAVENOUS PRN
Status: DISCONTINUED | OUTPATIENT
Start: 2022-06-12 | End: 2022-06-15 | Stop reason: HOSPADM

## 2022-06-12 RX ORDER — PANTOPRAZOLE SODIUM 40 MG/1
40 TABLET, DELAYED RELEASE ORAL DAILY
Status: DISCONTINUED | OUTPATIENT
Start: 2022-06-13 | End: 2022-06-15 | Stop reason: HOSPADM

## 2022-06-12 RX ORDER — UBIDECARENONE 75 MG
250 CAPSULE ORAL DAILY
Status: DISCONTINUED | OUTPATIENT
Start: 2022-06-13 | End: 2022-06-15 | Stop reason: HOSPADM

## 2022-06-12 RX ORDER — ENOXAPARIN SODIUM 100 MG/ML
40 INJECTION SUBCUTANEOUS DAILY
Status: DISCONTINUED | OUTPATIENT
Start: 2022-06-13 | End: 2022-06-15 | Stop reason: HOSPADM

## 2022-06-12 RX ORDER — ONDANSETRON 4 MG/1
4 TABLET, ORALLY DISINTEGRATING ORAL EVERY 8 HOURS PRN
Status: DISCONTINUED | OUTPATIENT
Start: 2022-06-12 | End: 2022-06-15 | Stop reason: HOSPADM

## 2022-06-12 RX ORDER — ONDANSETRON 2 MG/ML
4 INJECTION INTRAMUSCULAR; INTRAVENOUS EVERY 6 HOURS PRN
Status: DISCONTINUED | OUTPATIENT
Start: 2022-06-12 | End: 2022-06-15 | Stop reason: HOSPADM

## 2022-06-12 RX ORDER — LOSARTAN POTASSIUM 25 MG/1
25 TABLET ORAL DAILY
Status: DISCONTINUED | OUTPATIENT
Start: 2022-06-13 | End: 2022-06-13

## 2022-06-12 RX ORDER — ACETAMINOPHEN 650 MG/1
650 SUPPOSITORY RECTAL EVERY 6 HOURS PRN
Status: DISCONTINUED | OUTPATIENT
Start: 2022-06-12 | End: 2022-06-15 | Stop reason: HOSPADM

## 2022-06-12 RX ORDER — POLYETHYLENE GLYCOL 3350 17 G/17G
17 POWDER, FOR SOLUTION ORAL DAILY PRN
Status: DISCONTINUED | OUTPATIENT
Start: 2022-06-12 | End: 2022-06-15 | Stop reason: HOSPADM

## 2022-06-12 RX ORDER — INSULIN LISPRO 100 [IU]/ML
0-3 INJECTION, SOLUTION INTRAVENOUS; SUBCUTANEOUS NIGHTLY
Status: DISCONTINUED | OUTPATIENT
Start: 2022-06-12 | End: 2022-06-15 | Stop reason: HOSPADM

## 2022-06-12 RX ADMIN — SODIUM CHLORIDE 3000 MG: 9 INJECTION, SOLUTION INTRAVENOUS at 17:06

## 2022-06-12 RX ADMIN — GABAPENTIN 100 MG: 100 CAPSULE ORAL at 22:02

## 2022-06-12 RX ADMIN — Medication 3 MG: at 23:16

## 2022-06-12 RX ADMIN — SODIUM CHLORIDE, PRESERVATIVE FREE 10 ML: 5 INJECTION INTRAVENOUS at 22:02

## 2022-06-12 RX ADMIN — METHYLPREDNISOLONE SODIUM SUCCINATE 60 MG: 125 INJECTION, POWDER, FOR SOLUTION INTRAMUSCULAR; INTRAVENOUS at 16:21

## 2022-06-12 RX ADMIN — IPRATROPIUM BROMIDE AND ALBUTEROL SULFATE 1 AMPULE: 2.5; .5 SOLUTION RESPIRATORY (INHALATION) at 22:04

## 2022-06-12 ASSESSMENT — ENCOUNTER SYMPTOMS
SHORTNESS OF BREATH: 0
WHEEZING: 0
DIARRHEA: 0
COUGH: 0
NAUSEA: 0
ABDOMINAL PAIN: 0
VOMITING: 0
RHINORRHEA: 0

## 2022-06-12 NOTE — RT PROTOCOL NOTE
RT Nebulizer Bronchodilator Protocol Note    There is a bronchodilator order in the chart from a provider indicating to follow the RT Bronchodilator Protocol and there is an Initiate RT Bronchodilator Protocol order as well (see protocol at bottom of note). CXR Findings:  XR CHEST PORTABLE    Result Date: 6/12/2022  Low lung volumes. Stable perihilar scarring or atelectasis. No acute cardiopulmonary disease. The findings from the last RT Protocol Assessment were as follows:  Smoking: None or smoker <15 pack years  Respiratory Pattern: Regular pattern and RR 12-20 bpm  Breath Sounds: Slightly diminished and/or crackles  Cough: Strong, spontaneous, non-productive  Indication for Bronchodilator Therapy:    Bronchodilator Assessment Score: 2    Aerosolized bronchodilator medication orders have been revised according to the RT Nebulizer Bronchodilator Protocol below. Respiratory Therapist to perform RT Therapy Protocol Assessment initially then follow the protocol. Repeat RT Therapy Protocol Assessment PRN for score 0-3 or on second treatment, BID, and PRN for scores above 3. No Indications - adjust the frequency to every 6 hours PRN wheezing or bronchospasm, if no treatments needed after 48 hours then discontinue using Per Protocol order mode. If indication present, adjust the RT bronchodilator orders based on the Bronchodilator Assessment Score as indicated below. If a patient is on this medication at home then do not decrease Frequency below that used at home. 0-3 - enter or revise RT bronchodilator order(s) to equivalent RT Bronchodilator order with Frequency of every 4 hours PRN for wheezing or increased work of breathing using Per Protocol order mode.        4-6 - enter or revise RT Bronchodilator order(s) to two equivalent RT bronchodilator orders with one order with BID Frequency and one order with Frequency of every 4 hours PRN wheezing or increased work of breathing using Per Protocol order mode. 7-10 - enter or revise RT Bronchodilator order(s) to two equivalent RT bronchodilator orders with one order with TID Frequency and one order with Frequency of every 4 hours PRN wheezing or increased work of breathing using Per Protocol order mode. 11-13 - enter or revise RT Bronchodilator order(s) to one equivalent RT bronchodilator order with QID Frequency and an Albuterol order with Frequency of every 4 hours PRN wheezing or increased work of breathing using Per Protocol order mode. Greater than 13 - enter or revise RT Bronchodilator order(s) to one equivalent RT bronchodilator order with every 4 hours Frequency and an Albuterol order with Frequency of every 2 hours PRN wheezing or increased work of breathing using Per Protocol order mode. RT to enter RT Home Evaluation for COPD & MDI Assessment order using Per Protocol order mode.     Electronically signed by Nicolás Velazquez RCP on 6/12/2022 at 6:18 PM

## 2022-06-12 NOTE — ACP (ADVANCE CARE PLANNING)
Advanced Care Planning Note. Purpose of Encounter: Advanced care planning in light of hospitalization  Parties In Attendance: Patient,    Decisional Capacity: Yes  Subjective: Patient  understand that this conversation is to address long term care goal  Objective: Patient admitted to hospital with COPD exacerbation with chronic hypoxic respiratory failure  Goals of Care Determination: Patient would pursue CPR and Intubation if required. No tracheostomy or long term ventilation if required.      Code Status: full code  Time spent on Advanced care Plannin minutes  Advanced Care Planning Documents: documented patient's wishes, would like Christina Feliciano  to make medical decisions if unable to make decisions    Harriet Santos MD  2022 5:08 PM

## 2022-06-12 NOTE — ED PROVIDER NOTES
[facility]        Pt Name: Demi Arteaga  MRN: [id number]  [place] 12/22/1925  Date of evaluation: 6/12/2022  Provider: Laisha Baeza PA-C  PCP: Rhonda Lee MD  Note Started: 2:29 PM EDT        I have seen and evaluated this patient with my supervising physician Wes Baker MD.    [facility]       Chief Complaint   Patient presents with    Shortness of Breath     PATIENT ARRIVED [address] PATIENT STATES HE FEELS FINE AT THIS MOMENT, BUT HIS KIDS WANTED HIM SEEN. HE IS ON 2L CURRENTLY; NORMALLY WEARS OXYGEN AT NIGHT ONLY.  Shaking       HISTORY OF PRESENT ILLNESS   (Location, Timing/Onset, Context/Setting, Quality, Duration, Modifying Factors, Severity, Associated Signs and Symptoms)  Note limiting factors. Chief Complaint: Shivam Guevara is a 80 y.o. male who presents for evaluation of shakiness. Patient states that he started feeling shaky a few days ago. States he notices it more when he is trying to walk but is still ambulatory. No dizziness/lightheadedness, focal weakness, visual disturbances, facial asymmetry, speech difficulty or syncope. He denies any chest pain or shortness of breath, despite triage note. He is on nasal cannula oxygen as needed at home at baseline. He denies cough congestion runny nose. No fevers or chills. No abdominal pain nausea vomiting or diarrhea. No history of similar symptoms. States that his children just wanted him to get checked out. He has no other complaints or concerns at this time. Family now at bedside and providing additional information. Concerned that patient had a seizure versus syncope status post coughing spell this morning and states that he has not been acting right since then and encounter concern for aspiration. Nursing Notes were all reviewed and agreed with or any disagreements were addressed in the HPI.     REVIEW OF 0.083% NEBULIZER SOLUTION    Take 3 mLs by nebulization every 4 hours as needed for Wheezing    AMLODIPINE (NORVASC) 10 MG TABLET    Take 1 tablet by mouth daily    CYANOCOBALAMIN (B-12) 250 MCG TABS    Take 250 mcg by mouth daily    DOXAZOSIN (CARDURA) 4 MG TABLET    Take 1 tablet by mouth daily. FINASTERIDE (PROSCAR) 5 MG TABLET    Take 1 tablet by mouth daily    GABAPENTIN (NEURONTIN) 100 MG CAPSULE    Take 100 mg by mouth 4 times daily. LOSARTAN (COZAAR) 25 MG TABLET    Take 1 tablet by mouth daily    PANTOPRAZOLE (PROTONIX) 20 MG TABLET    Take 20 mg by mouth daily    VITAMIN B-1 (THIAMINE) 100 MG TABLET    Take 100 mg by mouth daily         ALLERGIES     Morphine    FAMILYHISTORY       Family History   Problem Relation Age of Onset    Elevated Lipids Mother     Cancer Father         lung cancer    Emphysema Father           SOCIAL HISTORY       Social History     Tobacco Use    Smoking status: Never Smoker    Smokeless tobacco: Never Used   Substance Use Topics    Alcohol use: Yes    Drug use: No       SCREENINGS    [place] Coma Scale  Eye Opening: Spontaneous  Best Verbal Response: Oriented  Best Motor Response: Obeys commands  [place] Coma Scale Score: 15        PHYSICAL EXAM    (up to 7 for level 4, 8 or more for level 5)     ED Triage Vitals   BP Temp Temp src Pulse Resp SpO2 Height Weight   -- -- -- -- -- -- -- --       Physical Exam  Vitals and nursing note reviewed. Constitutional:       Appearance: He is well-developed. He is not diaphoretic. HENT:      Head: Normocephalic and atraumatic. Right Ear: External ear normal.      Left Ear: External ear normal.      Nose: Nose normal.   Eyes:      General:         Right eye: No discharge. Left eye: No discharge. Extraocular Movements: Extraocular movements intact. Conjunctiva/sclera: Conjunctivae normal.      Pupils: Pupils are equal, round, and reactive to light.    Cardiovascular:      Rate and Rhythm: Normal rate and regular rhythm. Heart sounds: Normal heart sounds. Pulmonary:      Effort: Pulmonary effort is normal. No respiratory distress. Breath sounds: Normal breath sounds. Chest:      Chest wall: No tenderness. Abdominal:      General: There is no distension. Palpations: Abdomen is soft. Musculoskeletal:         General: Normal range of motion. Cervical back: Normal range of motion and neck supple. Skin:     General: Skin is warm and dry. Neurological:      Mental Status: He is alert and oriented to person, place, and time. Mental status is at baseline. GCS: GCS eye subscore is 4. GCS verbal subscore is 5. GCS motor subscore is 6. Cranial Nerves: Cranial nerves are intact. Sensory: Sensation is intact. Motor: Motor function is intact. No tremor or seizure activity.    Psychiatric:         Behavior: Behavior normal.         DIAGNOSTIC RESULTS   LABS:    Labs Reviewed   CBC WITH AUTO DIFFERENTIAL - Abnormal; Notable for the following components:       Result Value    Hemoglobin 12.4 (*)     Hematocrit 38.5 (*)     RDW 17.0 (*)     Neutrophils Absolute 8.3 (*)     All other components within normal limits   COMPREHENSIVE METABOLIC PANEL - Abnormal; Notable for the following components:    CO2 33 (*)     Glucose 131 (*)     All other components within normal limits   URINALYSIS WITH REFLEX TO CULTURE - Abnormal; Notable for the following components:    Color, UA DARK YELLOW (*)     Protein, UA 30 (*)     Leukocyte Esterase, Urine TRACE (*)     All other components within normal limits   BLOOD GAS, VENOUS - Abnormal; Notable for the following components:    pCO2, Carlos 60.8 (*)     pO2, Carlos 146.0 (*)     HCO3, Venous 34.1 (*)     Base Excess, Carlos 6.7 (*)     Carboxyhemoglobin 2.5 (*)     All other components within normal limits   TROPONIN   CK   MICROSCOPIC URINALYSIS   PROCALCITONIN   CBC WITH AUTO DIFFERENTIAL   BASIC METABOLIC PANEL W/ REFLEX TO MG FOR LOW K       When ordered only abnormal lab results are displayed. All other labs were within normal range or not returned as of this dictation. EKG: When ordered, EKG's are interpreted by the Emergency Department Physician in the absence of a cardiologist.  Please see their note for interpretation of EKG. RADIOLOGY:   Non-plain film images such as CT, Ultrasound and MRI are read by the radiologist. Plain radiographic images are visualized and preliminarily interpreted by the ED Provider with the below findings:        Interpretation per the Radiologist below, if available at the time of this note:    XR CHEST PORTABLE   Final Result   Low lung volumes. Stable perihilar scarring or atelectasis. No acute   cardiopulmonary disease. No results found. PROCEDURES   Unless otherwise noted below, none     Procedures    CRITICAL CARE TIME   There was a high probability of life-threatening clinical deterioration in the patient's condition requiring my urgent intervention. I personally saw the patient and independently provided 33 minutes of non-concurrent critical care out of the total shared critical care time provided, excluding separately reportable procedures.        CONSULTS:  IP CONSULT TO NEUROLOGY      EMERGENCY DEPARTMENT COURSE and DIFFERENTIAL DIAGNOSIS/MDM:   Vitals:    Vitals:    06/12/22 1628 06/12/22 1643 06/12/22 1658 06/12/22 1715   BP: 139/61 (!) 148/56 134/67 132/82   Pulse: 67 65 66 65   Resp:       SpO2: 94% 93% 94% 100%       Patient was given the following medications:  Medications   albuterol (PROVENTIL) nebulizer solution 5 mg (has no administration in time range)   ipratropium-albuterol (DUONEB) nebulizer solution 1 ampule (has no administration in time range)   methylPREDNISolone sodium (SOLU-MEDROL) injection 60 mg (60 mg IntraVENous Given 6/12/22 1621)   ampicillin-sulbactam (UNASYN) 3000 mg in 100 mL NS IVPB minibag (0 mg IntraVENous Stopped 6/12/22 1742)         Is this patient to be included in the SEP-1 Core Measure due to severe sepsis or septic shock? No   Exclusion criteria - the patient is NOT to be included for SEP-1 Core Measure due to: Infection is not suspected    Patient presents for evaluation of shakiness and tremors. On exam, he is resting comfortably in bed no acute distress and nontoxic. Vitals are stable and he is afebrile. Expiratory wheezing noted throughout. No rales or rhonchi. Chest is nontender and abdomen is benign. I do not appreciate any tremor on my evaluation. He has normal coordination. Please see attending note for EKG interpretation. CBC and CMP are unremarkable. No leukocytosis. Urinalysis is negative. pH came back 7.35 but he is hypercarbic with a PCO2 of 60.8. Patient was switched over from nasal cannula to BiPAP. Chest x-ray is negative but due to her reported coughing and choking he will be covered for possible aspiration with Unasyn. I believe he warrants admission for further evaluation management of this. Hospitalist resume care the patient at this time. Patient informed and agreeable. He is stable for admission. FINAL IMPRESSION      1. Wheezing    2. Bronchospasm    3. Aspiration into airway, initial encounter    4. Hypercapnia          DISPOSITION/PLAN   DISPOSITION Admitted 06/12/2022 05:04:26 PM      PATIENT REFERRED TO:  No follow-up provider specified.     DISCHARGE MEDICATIONS:  New Prescriptions    No medications on file       DISCONTINUED MEDICATIONS:  Discontinued Medications    No medications on file              (Please note that portions of this note were completed with a voice recognition program.  Efforts were made to edit the dictations but occasionally words are mis-transcribed.)    Hollie Terry PA-C (electronically signed)           Emmie Skinner PA-C  06/12/22 [id number]

## 2022-06-12 NOTE — PROGRESS NOTES
06/12/22 1815   RT Protocol   History Pulmonary Disease 0   Respiratory pattern 0   Breath sounds 2   Cough 0   Bronchodilator Assessment Score 2

## 2022-06-12 NOTE — H&P
- NGT placed 12/23 and started trickle feeds  - SLP recommended liquid diet and keeping NGT for now   HOSPITALISTS HISTORY AND PHYSICAL    6/12/2022 5:04 PM    Patient Information:  Yary Doyle is a 80 y.o. male [id number]  PCP:  Joan Us MD (Tel: [phone] )    Chief complaint:    Chief Complaint   Patient presents with    Shortness of Breath     PATIENT ARRIVED [address]. PATIENT STATES HE FEELS FINE AT THIS MOMENT, BUT HIS KIDS WANTED HIM SEEN. HE IS ON 2L CURRENTLY; NORMALLY WEARS OXYGEN AT NIGHT ONLY.  Shaking       History of Present Illness:  Rosalva Watkins is a 80 y.o. male who states has been having shortness of breath for the last couple weeks along with some shakes that started yesterday. Patient denies any fevers chills cough nausea vomiting or diarrhea or sick contacts. The patient states his kids brought him in for shakes. The patient's family is not in the room per the ED provider spoke with this the patient having twitching in arms moving for about 10 seconds possibly aspirated at that time bulb but no tongue biting or loss of urine. The patient does use 2 L of oxygen normally at home no prior history of seizure no diagnosis of COPD never smoked but father did smoke around him. REVIEW OF SYSTEMS:   Constitutional: Negative for fever,chills or night sweats  ENT: Negative for rhinorrhea, epistaxis, hoarseness, sore throat. Respiratory:see above  Cardiovascular: Negative for chest pain, palpitations   Gastrointestinal: Negative for nausea, vomiting, diarrhea  Genitourinary: Negative for polyuria, dysuria   Hematologic/Lymphatic: Negative for bleeding tendency, easy bruising  Musculoskeletal: Negative for myalgias and arthralgias  Neurologic: Negative for confusion,dysarthria. Skin: Negative for itching,rash  Psychiatric: Negative for depression,anxiety, agitation. Endocrine: Negative for polydipsia,polyuria,heat /cold intolerance.     Past Medical History:   has a past medical history of BPH, Elevated prostate specific antigen (PSA), Esophageal reflux, Fracture closed, humerus, HTN (hypertension), Hyperlipidemia, Osteoarthritis, Peripheral neuropathy, Peripheral neuropathy, and Type II or unspecified type diabetes mellitus without mention of complication, not stated as uncontrolled. Past Surgical History:   has a past surgical history that includes knee surgery (1998); Ankle surgery (2002); Tonsillectomy (1927); shoulder surgery (02/09/10); Colonoscopy (10-02); Cataract removal (10-03); Total knee arthroplasty (4-20-07); Revision total knee arthroplasty (1-14-09); Revision total knee arthroplasty (2-23-09); and Total knee arthroplasty (5-06). Medications:  No current facility-administered medications on file prior to encounter. Current Outpatient Medications on File Prior to Encounter   Medication Sig Dispense Refill    amLODIPine (NORVASC) 10 MG tablet Take 1 tablet by mouth daily 30 tablet 3    losartan (COZAAR) 25 MG tablet Take 1 tablet by mouth daily 90 tablet 1    albuterol (PROVENTIL) (2.5 MG/3ML) 0.083% nebulizer solution Take 3 mLs by nebulization every 4 hours as needed for Wheezing 120 each 3    Cyanocobalamin (B-12) 250 MCG TABS Take 250 mcg by mouth daily 30 tablet 0    finasteride (PROSCAR) 5 MG tablet Take 1 tablet by mouth daily 30 tablet 3    gabapentin (NEURONTIN) 100 MG capsule Take 100 mg by mouth 4 times daily.  pantoprazole (PROTONIX) 20 MG tablet Take 20 mg by mouth daily      vitamin B-1 (THIAMINE) 100 MG tablet Take 100 mg by mouth daily      doxazosin (CARDURA) 4 MG tablet Take 1 tablet by mouth daily. 90 tablet 3       Allergies: Allergies   Allergen Reactions    Morphine Other (See Comments)     Confusion & agitation        Social History:  Patient Lives at home   reports that he has never smoked. He has never used smokeless tobacco. He reports current alcohol use. He reports that he does not use drugs. Family History:  family history includes Cancer in his father; Elevated Lipids in his mother; Emphysema in his father. ,     Physical Exam:  /78   Pulse 68   Resp 15   SpO2 98%     General appearance:  Appears comfortable. AAOx3  HEENT: atraumatic, Pupils equal, muscous membranes moist, no masses appreciated  Cardiovascular: Regular rate and rhythm no murmurs appreciated  Respiratory: moderate expiratory wheezing  Gastrointestinal: Abdomen soft, non-tender, BS+  EXT: no edema  Neurology: no gross focal deficts  Psychiatry: Appropriate affect. Not agitated  Skin: Warm, dry, no rashes appreciated    Labs:  CBC:   Lab Results   Component Value Date    WBC 10.7 06/12/2022    RBC 4.32 06/12/2022    HGB 12.4 06/12/2022    HCT 38.5 06/12/2022    MCV 89.2 06/12/2022    MCH 28.6 06/12/2022    MCHC 32.1 06/12/2022    RDW 17.0 06/12/2022     06/12/2022    MPV 8.0 06/12/2022     BMP:    Lab Results   Component Value Date     06/12/2022    K 4.0 06/12/2022    K 4.6 04/22/2022     06/12/2022    CO2 33 06/12/2022    BUN 14 06/12/2022    CREATININE 1.1 06/12/2022    CALCIUM 9.6 06/12/2022    GFRAA >60 06/12/2022    GFRAA >60 05/25/2010    LABGLOM >60 06/12/2022    GLUCOSE 131 06/12/2022     XR CHEST PORTABLE   Final Result   Low lung volumes. Stable perihilar scarring or atelectasis. No acute   cardiopulmonary disease. Recent imaging reviewed    Problem List  Principal Problem:    Acute exacerbation of chronic obstructive pulmonary disease (COPD) ([facility] [address]  Resolved Problems:    * No resolved hospital problems.  *        Assessment/Plan:   Acute copd exacerbation with chronic hypoxic and hypercapnic respiratory failure  - duonebs  -iv steroids  - check procal  - hold off further atbx fornow  - speech eval    Tremors and possible seizure:  - seizure precautions  - neuro eval  - pt ot    Htn: home meds    Bph: home meds    DVT prophylaxis lovenox  Code status full code        Admit as inpatient I anticipate hospitalization spanning more than two midnights for investigation and treatment of the above medically necessary diagnoses. Please note that some part of this chart was generated using Dragon dictation software. Although every effort was made to ensure the accuracy of this automated transcription, some errors in transcription may have occurred inadvertently. If you may need any clarification, please do not hesitate to contact me through [facility].        Philippe Ross MD    6/12/2022 5:04 PM

## 2022-06-12 NOTE — ED PROVIDER NOTES
In addition to the advanced practice provider, I personally saw Bhavesh Harrington and performed a substantive portion of the visit including all aspects of the medical decision making. Medical Decision Making  Patient presenting from nursing home for evaluation of seizure versus syncope. Family states that he was having difficulty breathing this morning and had a coughing spell. Son then witnessed his eyes twitching and his arms moving and coordinated fashion for about 10 seconds. They deny any tongue biting or loss of continence. They are concerned that he aspirated his food. They state that he is very weak and fatigued since this episode. He does use oxygen at baseline though they deny any testing for COPD before. They state that he is currently short of breath even at rest.  Denies seizure history. He normally uses 2 L nasal cannula. Exam patient is comfortable at rest with significant expiratory wheezing in all lung fields. Satting mid 90s on 2 L nasal cannula. Cardiac regular rate and rhythm. He moves all 4 extremities to command and is alert and oriented x  EKG  The Ekg interpreted by me in the absence of a cardiologist shows. Normal sinus rhythm with a ventricular rate of 68. First degree AV block  Axis is left. QTc is appropriate. LVH changes. No specific ST or T wave abnormality. No significant change from prior 4-. Screenings  Is this patient to be included in the SEP-1 Core Measure due to severe sepsis or septic shock? No   Exclusion criteria - the patient is NOT to be included for SEP-1 Core Measure due to:   Infection is not suspected     Hadley Coma Scale  Eye Opening: Spontaneous  Best Verbal Response: Oriented  Best Motor Response: Obeys commands  Hadley Coma Scale Score: 15         Medications   albuterol (PROVENTIL) nebulizer solution 5 mg (has no administration in time range)   ampicillin-sulbactam (UNASYN) 3000 mg in 100 mL NS IVPB minibag (has no administration in time range)   ipratropium-albuterol (DUONEB) nebulizer solution 1 ampule (has no administration in time range)   methylPREDNISolone sodium (SOLU-MEDROL) injection 60 mg (60 mg IntraVENous Given 6/12/22 1621)     Course and MDM:  Patient presents for evaluation of seizure like episode and coughing spell. He arrives hypoxic on baseline 2 L nasal cannula with diffuse expiratory wheezing. Concern for aspiration pneumonia by history and Unasyn was initiated. He does not have any known diagnosis of COPD but his exam is concerning for bronchospasm so IV steroids and albuterol are also given. Patient hypercapnic today and has required BiPAP therapy in the past though does not have CPAP or BiPAP available for home-going. I am initiate BiPAP therapy today. He is protecting his airway appropriately otherwise. He is agreeable to admission today for further treatment of above. The total Critical Care time is 35 minutes which excludes separately billable procedures. Patient Referrals:  No follow-up provider specified. Discharge Medications:  New Prescriptions    No medications on file       FINAL IMPRESSION  1. Wheezing    2. Bronchospasm    3. Aspiration into airway, initial encounter    4. Hypercapnia        Blood pressure 117/78, pulse 68, resp. rate 15, SpO2 98 %.      For further details of [facility] emergency department encounter, please see documentation by advanced practice provider        Al Al MD  06/12/22 5211       Al Al MD  06/12/22 9586

## 2022-06-13 LAB
ANION GAP SERPL CALCULATED.3IONS-SCNC: 7 MMOL/L (ref 3–16)
BASOPHILS ABSOLUTE: 0 K/UL (ref 0–0.2)
BASOPHILS RELATIVE PERCENT: 0.1 %
BUN BLDV-MCNC: 16 MG/DL (ref 7–20)
CALCIUM SERPL-MCNC: 9.3 MG/DL (ref 8.3–10.6)
CHLORIDE BLD-SCNC: 103 MMOL/L (ref 99–110)
CO2: 31 MMOL/L (ref 21–32)
CREAT SERPL-MCNC: 0.9 MG/DL (ref 0.8–1.3)
EOSINOPHILS ABSOLUTE: 0 K/UL (ref 0–0.6)
EOSINOPHILS RELATIVE PERCENT: 0 %
GFR AFRICAN AMERICAN: >60
GFR NON-AFRICAN AMERICAN: >60
GLUCOSE BLD-MCNC: 169 MG/DL (ref 70–99)
GLUCOSE BLD-MCNC: 169 MG/DL (ref 70–99)
GLUCOSE BLD-MCNC: 177 MG/DL (ref 70–99)
GLUCOSE BLD-MCNC: 186 MG/DL (ref 70–99)
GLUCOSE BLD-MCNC: 199 MG/DL (ref 70–99)
HCT VFR BLD CALC: 38.5 % (ref 40.5–52.5)
HEMOGLOBIN: 12.6 G/DL (ref 13.5–17.5)
LYMPHOCYTES ABSOLUTE: 0.7 K/UL (ref 1–5.1)
LYMPHOCYTES RELATIVE PERCENT: 11.9 %
MCH RBC QN AUTO: 29.3 PG (ref 26–34)
MCHC RBC AUTO-ENTMCNC: 32.8 G/DL (ref 31–36)
MCV RBC AUTO: 89.2 FL (ref 80–100)
MONOCYTES ABSOLUTE: 0.1 K/UL (ref 0–1.3)
MONOCYTES RELATIVE PERCENT: 2.3 %
NEUTROPHILS ABSOLUTE: 5.1 K/UL (ref 1.7–7.7)
NEUTROPHILS RELATIVE PERCENT: 85.7 %
PDW BLD-RTO: 17.2 % (ref 12.4–15.4)
PERFORMED ON: ABNORMAL
PLATELET # BLD: 163 K/UL (ref 135–450)
PMV BLD AUTO: 8 FL (ref 5–10.5)
POTASSIUM REFLEX MAGNESIUM: 4.4 MMOL/L (ref 3.5–5.1)
RBC # BLD: 4.32 M/UL (ref 4.2–5.9)
SODIUM BLD-SCNC: 141 MMOL/L (ref 136–145)
WBC # BLD: 5.9 K/UL (ref 4–11)

## 2022-06-13 PROCEDURE — 97166 OT EVAL MOD COMPLEX 45 MIN: CPT

## 2022-06-13 PROCEDURE — 2700000000 HC OXYGEN THERAPY PER DAY

## 2022-06-13 PROCEDURE — 94761 N-INVAS EAR/PLS OXIMETRY MLT: CPT

## 2022-06-13 PROCEDURE — 36415 COLL VENOUS BLD VENIPUNCTURE: CPT

## 2022-06-13 PROCEDURE — 92610 EVALUATE SWALLOWING FUNCTION: CPT

## 2022-06-13 PROCEDURE — 6370000000 HC RX 637 (ALT 250 FOR IP): Performed by: NURSE PRACTITIONER

## 2022-06-13 PROCEDURE — 85025 COMPLETE CBC W/AUTO DIFF WBC: CPT

## 2022-06-13 PROCEDURE — 80048 BASIC METABOLIC PNL TOTAL CA: CPT

## 2022-06-13 PROCEDURE — 97165 OT EVAL LOW COMPLEX 30 MIN: CPT

## 2022-06-13 PROCEDURE — 97530 THERAPEUTIC ACTIVITIES: CPT

## 2022-06-13 PROCEDURE — 2580000003 HC RX 258: Performed by: INTERNAL MEDICINE

## 2022-06-13 PROCEDURE — 97116 GAIT TRAINING THERAPY: CPT

## 2022-06-13 PROCEDURE — 92526 ORAL FUNCTION THERAPY: CPT

## 2022-06-13 PROCEDURE — 94640 AIRWAY INHALATION TREATMENT: CPT

## 2022-06-13 PROCEDURE — 1200000000 HC SEMI PRIVATE

## 2022-06-13 PROCEDURE — 97162 PT EVAL MOD COMPLEX 30 MIN: CPT

## 2022-06-13 PROCEDURE — 6360000002 HC RX W HCPCS: Performed by: INTERNAL MEDICINE

## 2022-06-13 PROCEDURE — 99222 1ST HOSP IP/OBS MODERATE 55: CPT | Performed by: PSYCHIATRY & NEUROLOGY

## 2022-06-13 PROCEDURE — 6370000000 HC RX 637 (ALT 250 FOR IP): Performed by: INTERNAL MEDICINE

## 2022-06-13 RX ORDER — AMITRIPTYLINE HYDROCHLORIDE 10 MG/1
10 TABLET, FILM COATED ORAL NIGHTLY
Status: DISCONTINUED | OUTPATIENT
Start: 2022-06-13 | End: 2022-06-15 | Stop reason: HOSPADM

## 2022-06-13 RX ORDER — LOSARTAN POTASSIUM 25 MG/1
50 TABLET ORAL DAILY
Status: DISCONTINUED | OUTPATIENT
Start: 2022-06-14 | End: 2022-06-15

## 2022-06-13 RX ORDER — AMITRIPTYLINE HYDROCHLORIDE 10 MG/1
25 TABLET, FILM COATED ORAL NIGHTLY
COMMUNITY

## 2022-06-13 RX ORDER — LOSARTAN POTASSIUM 25 MG/1
25 TABLET ORAL ONCE
Status: COMPLETED | OUTPATIENT
Start: 2022-06-13 | End: 2022-06-13

## 2022-06-13 RX ADMIN — IPRATROPIUM BROMIDE AND ALBUTEROL SULFATE 1 AMPULE: 2.5; .5 SOLUTION RESPIRATORY (INHALATION) at 08:04

## 2022-06-13 RX ADMIN — SODIUM CHLORIDE, PRESERVATIVE FREE 10 ML: 5 INJECTION INTRAVENOUS at 20:47

## 2022-06-13 RX ADMIN — GABAPENTIN 100 MG: 100 CAPSULE ORAL at 08:23

## 2022-06-13 RX ADMIN — SODIUM CHLORIDE, PRESERVATIVE FREE 10 ML: 5 INJECTION INTRAVENOUS at 08:26

## 2022-06-13 RX ADMIN — THIAMINE HCL TAB 100 MG 100 MG: 100 TAB at 08:23

## 2022-06-13 RX ADMIN — GABAPENTIN 100 MG: 100 CAPSULE ORAL at 17:32

## 2022-06-13 RX ADMIN — Medication 250 MCG: at 08:23

## 2022-06-13 RX ADMIN — IPRATROPIUM BROMIDE AND ALBUTEROL SULFATE 1 AMPULE: 2.5; .5 SOLUTION RESPIRATORY (INHALATION) at 19:35

## 2022-06-13 RX ADMIN — AMITRIPTYLINE HYDROCHLORIDE 10 MG: 10 TABLET, FILM COATED ORAL at 20:47

## 2022-06-13 RX ADMIN — IPRATROPIUM BROMIDE AND ALBUTEROL SULFATE 1 AMPULE: 2.5; .5 SOLUTION RESPIRATORY (INHALATION) at 16:03

## 2022-06-13 RX ADMIN — METHYLPREDNISOLONE SODIUM SUCCINATE 40 MG: 40 INJECTION, POWDER, FOR SOLUTION INTRAMUSCULAR; INTRAVENOUS at 08:26

## 2022-06-13 RX ADMIN — LOSARTAN POTASSIUM 25 MG: 25 TABLET, FILM COATED ORAL at 12:23

## 2022-06-13 RX ADMIN — LOSARTAN POTASSIUM 25 MG: 25 TABLET, FILM COATED ORAL at 08:23

## 2022-06-13 RX ADMIN — INSULIN LISPRO 1 UNITS: 100 INJECTION, SOLUTION INTRAVENOUS; SUBCUTANEOUS at 08:27

## 2022-06-13 RX ADMIN — INSULIN LISPRO 1 UNITS: 100 INJECTION, SOLUTION INTRAVENOUS; SUBCUTANEOUS at 20:54

## 2022-06-13 RX ADMIN — IPRATROPIUM BROMIDE AND ALBUTEROL SULFATE 1 AMPULE: 2.5; .5 SOLUTION RESPIRATORY (INHALATION) at 12:19

## 2022-06-13 RX ADMIN — INSULIN LISPRO 1 UNITS: 100 INJECTION, SOLUTION INTRAVENOUS; SUBCUTANEOUS at 12:24

## 2022-06-13 RX ADMIN — AMLODIPINE BESYLATE 10 MG: 5 TABLET ORAL at 08:24

## 2022-06-13 RX ADMIN — FINASTERIDE 5 MG: 5 TABLET, FILM COATED ORAL at 08:24

## 2022-06-13 RX ADMIN — GABAPENTIN 100 MG: 100 CAPSULE ORAL at 12:23

## 2022-06-13 RX ADMIN — GABAPENTIN 100 MG: 100 CAPSULE ORAL at 20:47

## 2022-06-13 RX ADMIN — ENOXAPARIN SODIUM 40 MG: 100 INJECTION SUBCUTANEOUS at 08:25

## 2022-06-13 RX ADMIN — INSULIN LISPRO 1 UNITS: 100 INJECTION, SOLUTION INTRAVENOUS; SUBCUTANEOUS at 17:32

## 2022-06-13 RX ADMIN — PANTOPRAZOLE SODIUM 40 MG: 40 TABLET, DELAYED RELEASE ORAL at 05:58

## 2022-06-13 NOTE — PROGRESS NOTES
[facility] Department   Phone: [phone]    Occupational Therapy    [x] Initial Evaluation            [] Daily Treatment Note         [] Discharge Summary      Patient: Cristela Ambriz   : 1925   MRN: [id number]   Date of Service:  2022    Admitting Diagnosis:  Acute exacerbation of chronic obstructive pulmonary disease (COPD) ([facility] [address]  Current Admission Summary: Cristela Ambriz is a 80 y.o. male who presents for evaluation of shakiness. Patient states that he started feeling shaky a few days ago. States he notices it more when he is trying to walk but is still ambulatory. No dizziness/lightheadedness, focal weakness, visual disturbances, facial asymmetry, speech difficulty or syncope. He denies any chest pain or shortness of breath, despite triage note. He is on nasal cannula oxygen as needed at home at baseline. He denies cough congestion runny nose. No fevers or chills. No abdominal pain nausea vomiting or diarrhea. No history of similar symptoms. States that his children just wanted him to get checked out. He has no other complaints or concerns at this time.     Past Medical History:  has a past medical history of BPH, Elevated prostate specific antigen (PSA), Esophageal reflux, Fracture closed, humerus, HTN (hypertension), Hyperlipidemia, Osteoarthritis, Peripheral neuropathy, Peripheral neuropathy, and Type II or unspecified type diabetes mellitus without mention of complication, not stated as uncontrolled. Past Surgical History:  has a past surgical history that includes knee surgery (); Ankle surgery (); Tonsillectomy (); shoulder surgery (02/09/10); Colonoscopy (10-02); Cataract removal (10-03); Total knee arthroplasty (07); Revision total knee arthroplasty (09); Revision total knee arthroplasty (09); and Total knee arthroplasty ().     Discharge Recommendations: Cristela Ambriz scored a 19/24 on the AM-PAC ADL Inpatient form. Current research shows that an AM-PAC score of 18 or greater is typically associated with a discharge to the patient's home setting. Based on the patient's AM-PAC score, and their current ADL deficits, it is recommended that the patient have 2-3 sessions per week of Occupational Therapy at d/c to increase the patient's independence. At this time, this patient demonstrates the endurance and safety to discharge home with home services and a follow up treatment frequency of 2-3x/wk. Please see assessment section for further patient specific details. HOME HEALTH CARE: LEVEL 3 SAFETY     - Initial home health evaluation to occur within 24-48 hours, in patient home   - Therapy evaluations in home within 24-48 hours of discharge; including DME and home safety   - Frontload therapy 5 days, then 3x a week   - Therapy to evaluate if patient has [address] needs for personal care   -  evaluation within 24-48 hours, includes evaluation of resources and insurance to determine [place], [place], LTC, and Medicaid options     If patient discharges prior to next session this note will serve as a discharge summary. Please see below for the latest assessment towards goals. DME Required For Discharge: no DME required at discharge    Precautions/Restrictions: high fall risk  Positional Restrictions:no positional restrictions    Pre-Admission Information   Lives With: spouse    Type of Home: house  Home Layout: one level  Home Access: level entry  [facility]: walker accessible, walk in shower  Toilet Height: standard height  Bathroom Equipment: grab bars in shower, grab bars around toilet, shower chair, hand held shower head  Home Equipment: rolling walker, reacher, oxygen, alert button, .   Comment: Uses RW sometimes if needed; on night O2,possibly 3.5 L  Transfer Assistance: Independent without use of device  Ambulation Assistance:modified independent with use of RW  ADL Assistance: independent with all ADL's  IADL Assistance: requires assistance with all homemaking tasks, [employer] comes 3 hrs per day for cooking,cleaning, laundry;dtr grocery shops  Active :        [x] Yes  [] No  Hand Dominance: [] Left  [] Right  Current Employment: retired. Occupation: Finance for [employer]:  Used to drive for the [employer]  Recent Falls: 1 fall past 6 mos. Examination   Vision:   Vision Corrective Device: wears glasses for distance   (Driving)  Hearing:   WFL (Notices some deterioration since care home)  Perception:   WFL     Observation:   General Observation:  Pt on 4L O2. Sensation:   WFL  Proprioception:    WFL  Tone:   Normotonic  Coordination Testing:   WFL    ROM:   (B) UE AROM WFL  Strength:   (L) Shoulder: 5     (R) Shoulder: 5  (L) Elbow: 5     (R) Elbow: 5  (L)  Strength: 5    (R)  Strength: 5    Decision Making: low complexity  Clinical Presentation: evolving      Subjective  General: Pt seated in recliner on arrival,pleasant and agreeable to OT eval. Pt denies pain. Pain: 0/10  Pain Interventions: not applicable        Activities of Daily Living  Basic Activities of Daily Living  Lower Extremity Dressing: moderate assistance. Equipment: none  Dressing Comments: socks; Mod A doff; Dep don--pt uses AE; Min A to slip feet into slip-on shoes  General Comments: Pt declined further ADLs  Instrumental Activities of Daily Living  No IADL completed on this date. Functional Mobility  Bed Mobility  Bed mobility not completed on this date. Comments:  Transfers  Sit to stand transfer:stand by assistance, contact guard assistance  Stand to sit transfer: stand by assistance, contact guard assistance  Stand step transfer: stand by assistance, contact guard assistance, . Comment w/RW  Comments:   Functional Mobility:  Standing Balance: contact guard assistance. Standing Balance Comment: w/RW  Functional Mobility: rolling walker.   stand by assistance, contact guard assistance. Activity: functioanl mobility ~100 ft w/RW, standing ~4 min; functional mobility ~22 ft in room without RW. Comment: Cues to stand closer to walker. Functional Outcomes  AM-PAC Inpatient Daily Activity Raw Score: 23    Cognition  WFL  Orientation:    oriented to person, oriented to place, oriented to situation and disoriented to time ([place] month;stated August, then Sept.)  Command Following:   [facility]     Education  Barriers To Learning: none  Patient Education: patient educated on OT role and benefits, plan of care, adaptive device training, transfer training, discharge recommendations  Learning Assessment:  patient verbalizes and demonstrates understanding    Assessment  Activity Tolerance: Tolerated well. 96-97% SPO2 on 4L O2. Impairments Requiring Therapeutic Intervention: decreased functional mobility, decreased ADL status, decreased endurance  Prognosis: good  Clinical Assessment: Pt is below his baseline level of occupational function, based on the above deficits associated with acute exacerbation of COPD. Pt requiring 4L of O2, and mainttained SPO2 96-97%. Pt would benefit from continued skilled acute OT services to address these deficits to maximize his safety and independence. Safety Interventions: patient left in chair, chair alarm in place, call light within reach, gait belt and nurse notified    Plan  Frequency: 3-5 x/per week  Current Treatment Recommendations: functional mobility training, transfer training, endurance training and ADL/self-care training    Goals  Patient Goals: To return home   Short Term Goals:  Time Frame: Discharge  Patient will complete upper body ADL at Independent   Patient will complete lower body ADL at modified independent, . Comment with AE   Patient will complete toileting at modified independent   Patient will complete grooming at modified independent, .   Comment in stance at sink   Patient will complete functional transfers at modified independent, . Comment w/RW as needed   Patient will complete functional mobility at modified independent, .   Comment w/RW as needed     Therapy Session Time     Individual Group Co-treatment   Time In    1345   Time Out    1429   Minutes    44        Timed Code Treatment Minutes:    29 min  Total Treatment Minutes:  44 min       Electronically Signed By: DESMOND Almazan, OTR/L, [id number]

## 2022-06-13 NOTE — CONSULTS
In patient Neurology consult        [facility] Neurology      Kadie Randall MD      [address] [facility]  12/22/1925    Date of Service: 6/13/2022    Referring Physician: Radha Zaragoza MD      Reason for the consult and CC: Possible seizure-like activity    HPI:   The patient is a 80y.o.  years old male with history of COPD and other medical issues who was admitted to the hospital yesterday with generalized body shaking and breathing difficulties. She has been having trouble with his breathing for the last several days. He had an episode yesterday before admission order has some brief body jerking for few seconds to minutes. He was aware of the whole incident. No LOC or [place]. No tongue biting or bladder incontinence. He was moderately duration was seconds to minutes. No other relieving or aggravating factors. No clear triggers. Patient was somewhat short of breath and he hypoxic during such incident. He does use oxygen at home. Family decided to bring him to the ER where he was admitted. Initial ED work-up was unremarkable. He  He denies any prior history of seizure history of head trauma with LOC. No febrile convulsion or family history of epilepsy. Other review of system was unremarkable.       Family History   Problem Relation Age of Onset    Elevated Lipids Mother     Cancer Father         lung cancer    Emphysema Father      Past Surgical History:   Procedure Laterality Date    ANKLE SURGERY  2002    CATARACT REMOVAL  10-03    Right    COLONOSCOPY  10-02    Normal    KNEE SURGERY  1998    Arthroscopic Right    REVISION TOTAL KNEE ARTHROPLASTY  1-14-09    Infected knee prosthesis removed, space placed(Right knee)    REVISION TOTAL KNEE ARTHROPLASTY  2-23-09    Right knee spacer removed and new joint inserted    SHOULDER SURGERY  02/09/10    Total Shoulder arthroplasty    TONSILLECTOMY  1927    TOTAL KNEE ARTHROPLASTY  4-20-07    Right    TOTAL KNEE ARTHROPLASTY  5-06    Left Past Medical History:   Diagnosis Date    BPH     Elevated prostate specific antigen (PSA)     Esophageal reflux     Fracture closed, humerus 12-9-09    Shoulder    HTN (hypertension)     Hyperlipidemia     Osteoarthritis     Peripheral neuropathy     Peripheral neuropathy     Type II or unspecified type diabetes mellitus without mention of complication, not stated as uncontrolled      Social History     Tobacco Use    Smoking status: Never Smoker    Smokeless tobacco: Never Used   Substance Use Topics    Alcohol use:  Yes    Drug use: No     Allergies   Allergen Reactions    Morphine Other (See Comments)     Confusion & agitation     Current Facility-Administered Medications   Medication Dose Route Frequency Provider Last Rate Last Admin    [START ON 6/14/2022] losartan (COZAAR) tablet 50 mg  50 mg Oral Daily Frankie Yin MD        amitriptyline (ELAVIL) tablet 10 mg  10 mg Oral Nightly Frankie Yin MD        albuterol (PROVENTIL) nebulizer solution 5 mg  5 mg Nebulization Q6H PRN Christy Hare MD        ipratropium-albuterol (DUONEB) nebulizer solution 1 ampule  1 ampule Inhalation Q4H WA Frankie Yin MD   1 ampule at 06/13/22 1219    amLODIPine (NORVASC) tablet 10 mg  10 mg Oral Daily Frankie Yin MD   10 mg at 06/13/22 0824    vitamin B-12 (CYANOCOBALAMIN) tablet 250 mcg  250 mcg Oral Daily Frankie Yin MD   250 mcg at 06/13/22 0672    finasteride (PROSCAR) tablet 5 mg  5 mg Oral Daily Frankie Yin MD   5 mg at 06/13/22 7527    gabapentin (NEURONTIN) capsule 100 mg  100 mg Oral 4x Daily Frankie Yin MD   100 mg at 06/13/22 1223    pantoprazole (PROTONIX) tablet 40 mg  40 mg Oral Daily Frankie Yin MD   40 mg at 06/13/22 0558    thiamine mononitrate tablet 100 mg  100 mg Oral Daily Frankie Yin MD   100 mg at 06/13/22 3066    methylPREDNISolone sodium (SOLU-MEDROL) injection 40 mg  40 mg IntraVENous Daily Frankie Yin MD   40 mg at 06/13/22 0826    sodium chloride flush 0.9 % injection 5-40 mL  5-40 mL IntraVENous 2 times per day Maryam Sorenson MD   10 mL at 06/13/22 0826    sodium chloride flush 0.9 % injection 5-40 mL  5-40 mL IntraVENous PRN Maryam Sorenson MD        0.9 % sodium chloride infusion   IntraVENous PRN Maryam Sorenson MD        enoxaparin (LOVENOX) injection 40 mg  40 mg SubCUTAneous Daily Maryam Sorenson MD   40 mg at 06/13/22 0825    ondansetron (ZOFRAN-ODT) disintegrating tablet 4 mg  4 mg Oral Q8H PRN Maryam Sorenson MD        Or    ondansetron (ZOFRAN) injection 4 mg  4 mg IntraVENous Q6H PRN Maryam Sorenson MD        polyethylene glycol (GLYCOLAX) packet 17 g  17 g Oral Daily PRN Maryam Sorenson MD        acetaminophen (TYLENOL) tablet 650 mg  650 mg Oral Q6H PRN Maryam Sorenson MD        Or   Jacklyn Bustillo acetaminophen (TYLENOL) suppository 650 mg  650 mg Rectal Q6H PRN Maryam Sorneson MD        insulin lispro (HUMALOG) injection vial 0-6 Units  0-6 Units SubCUTAneous TID WC Tamiko Minick, APRN - CNP   1 Units at 06/13/22 1224    insulin lispro (HUMALOG) injection vial 0-3 Units  0-3 Units SubCUTAneous Nightly Tamiko Minick, APRN - CNP        dextrose bolus 10% 125 mL  125 mL IntraVENous PRN Tamiko Minick, APRN - CNP        Or    dextrose bolus 10% 250 mL  250 mL IntraVENous PRN Tamiko Minick, APRN - CNP        glucagon (rDNA) injection 1 mg  1 mg IntraMUSCular PRN Tamiko Minick, APRN - CNP        dextrose 5 % solution  100 mL/hr IntraVENous PRN Tamiko Minick, APRN - CNP        melatonin tablet 3 mg  3 mg Oral Nightly PRN Tamiko Minick, APRN - CNP   3 mg at 06/12/22 2316       ROS : A 10-14 system review of constitutional, cardiovascular, respiratory, eyes, musculoskeletal, endocrine, GI, ENT, skin, hematological, genitourinary, psychiatric and neurologic systems was obtained and updated today and is unremarkable except as mentioned in my HPI      Exam:     Constitutional:   Vitals: 06/13/22 0715 06/13/22 0805 06/13/22 1201 06/13/22 1219   BP: (!) 177/84  (!) 184/76    Pulse: 81 74 72 74   Resp: 18 18 18 18   Temp: 98.1 °F (36.7 °C)  97.5 °F (36.4 °C)    TempSrc: Oral  Oral    SpO2: 96% 94% 94% 95%   Weight:       Height:           General appearance and observation: Normal development and appear in no acute distress. Eye:  Fundus: No blurring of optic disc. Neck: supple  Cardiovascular: No lower leg edema with good pulsation. Mental Status:   Oriented to person, place, problem, and time. Memory: Good immediate recall. Intact remote memory  Normal attention span and concentration. Language: intact naming, repeating and fluency   Good fund of Knowledge. Cranial Nerves:   II: Visual fields: Full. Pupils: equal, round, reactive to light  III,IV,VI: Extra Ocular Movements are intact. No nystagmus  V: Facial sensation is intact  VII: Facial strength and movements: intact and symmetric  VIII: Hearing: Intact  IX: Palate elevation is symmetric  XI: Shoulder shrug is intact  XII: Tongue movements are normal  Musculoskeletal: 5/5 in all 4 extremities. Tone: Normal tone. Reflexes: Symmetric 2+ in the arms and 2+ in the legs   Planters: flexor bilaterally. Coordination: no pronator drift, no dysmetria with FNF in upper extremities. Normal REM. Sensation: normal to all modalities in both arms and legs. Gait/Posture: steady gait and normal posturing and station.      Data:  LABS:   Lab Results   Component Value Date     06/13/2022    K 4.4 06/13/2022     06/13/2022    CO2 31 06/13/2022    BUN 16 06/13/2022    CREATININE 0.9 06/13/2022    GFRAA >60 06/13/2022    GFRAA >60 05/25/2010    LABGLOM >60 06/13/2022    GLUCOSE 186 06/13/2022    CALCIUM 9.3 06/13/2022     Lab Results   Component Value Date    WBC 5.9 06/13/2022    RBC 4.32 06/13/2022    HGB 12.6 06/13/2022    HCT 38.5 06/13/2022    MCV 89.2 06/13/2022    RDW 17.2 06/13/2022     06/13/2022   No results found for: INR, PROTIME    Neuroimaging was independently reviewed by myself and discussed results with the patient   Reviewed notes from different physicians  Reviewed lab and blood testing    Impression:  Possible new onset seizure-like activity, new symptoms. Likely related to hypoxia. Less likely epileptic seizure. Nonfocal exam  COPD exacerbation  Hypertension    Recommendation:  Seizure precautions  Continue respiratory support  PT and OT  Continue current blood pressure medications  Blood pressure monitor  Insulin sliding scale and blood sugar control  DVT and GI prophylaxis  No need to start AED at this point  Consider neuroimaging with MRI or EEG if symptoms recur  Discussed risk of falling, injury and recurrence  Telemetry  Can be discharged once medically stable  No further recommendation        Thank you for referring such patient. If you have any questions regarding my consult note, please don't hesitate to call me. Liang Espinoza MD  [phone]    This dictation was generated by voice recognition computer software.  Although all attempts are made to edit the dictation for accuracy, there may be errors in the  transcription that are not intended

## 2022-06-13 NOTE — CARE COORDINATION
CM reviewed chart at this time for d/c planning. Pt from home, sent in by son. Has been getting more sob and weaker. Pt currently on 4 liters of oxygen. PT/OT and neuro consults are pending at this time. CM will continue to follow for d/c planning.     José Miguel Hart RN, BSN  [phone]

## 2022-06-13 NOTE — CARE COORDINATION
Discharge Planning Assessment  Discharge Planning Assessment  RN/SW discharge planner met with patient/ (and family member) to discuss reason for admission, current living situation, and potential needs at the time of discharge    Demographics/Insurance verified Yes    Current type of dwelling:[facility] - states working on AL arrangements     Patient from ECF/SW confirmed with:n/a     Living arrangements:lives with spouse     Level of function/Support:pt states independent / states has daughter that helps make decisions and lives in area     [address]     Last Visit to PCP:May 2022    DME:Oxygen at night pt thinks with Nicolasa Kuo unsure     Active with any community resources/agencies/skilled home care:    Medication compliance issues:states independent     Financial issues that could impact healthcare:none        Tentative discharge plan:IL vs snf     Discussed and provided facilities of choice if transition to a skilled nursing facility is required at the time of discharge    Therapy pending at this time snf lists provided and epic referral made to [place]     Discussed with patient and/or family that on the day of discharge home tentative time of discharge will be between 10 AM and noon. Transportation at the time of discharge:transportation service vs family depending on disposition.       Monique Brennan, RN, BSN  [phone]

## 2022-06-13 NOTE — PROGRESS NOTES
[facility] PROGRESS NOTE    6/13/2022 9:18 AM        Name: Bev Bill .               Admitted: 6/12/2022  Primary Care Provider: Jacques Marcano MD (Tel: [phone])        Subjective:    Patient tremors improved no nausea vomitting sob improving    Reviewed interval ancillary notes    Current Medications  [START ON 6/14/2022] losartan (COZAAR) tablet 50 mg, Daily  albuterol (PROVENTIL) nebulizer solution 5 mg, Q6H PRN  ipratropium-albuterol (DUONEB) nebulizer solution 1 ampule, Q4H WA  amLODIPine (NORVASC) tablet 10 mg, Daily  vitamin B-12 (CYANOCOBALAMIN) tablet 250 mcg, Daily  finasteride (PROSCAR) tablet 5 mg, Daily  gabapentin (NEURONTIN) capsule 100 mg, 4x Daily  pantoprazole (PROTONIX) tablet 40 mg, Daily  thiamine mononitrate tablet 100 mg, Daily  methylPREDNISolone sodium (SOLU-MEDROL) injection 40 mg, Daily  sodium chloride flush 0.9 % injection 5-40 mL, 2 times per day  sodium chloride flush 0.9 % injection 5-40 mL, PRN  0.9 % sodium chloride infusion, PRN  enoxaparin (LOVENOX) injection 40 mg, Daily  ondansetron (ZOFRAN-ODT) disintegrating tablet 4 mg, Q8H PRN   Or  ondansetron (ZOFRAN) injection 4 mg, Q6H PRN  polyethylene glycol (GLYCOLAX) packet 17 g, Daily PRN  acetaminophen (TYLENOL) tablet 650 mg, Q6H PRN   Or  acetaminophen (TYLENOL) suppository 650 mg, Q6H PRN  insulin lispro (HUMALOG) injection vial 0-6 Units, TID WC  insulin lispro (HUMALOG) injection vial 0-3 Units, Nightly  dextrose bolus 10% 125 mL, PRN   Or  dextrose bolus 10% 250 mL, PRN  glucagon (rDNA) injection 1 mg, PRN  dextrose 5 % solution, PRN  melatonin tablet 3 mg, Nightly PRN        Objective:  BP (!) 177/84   Pulse 74   Temp 98.1 °F (36.7 °C) (Oral)   Resp 18   Ht 5' 10\" (1.778 m)   Wt 245 lb (111.1 kg)   SpO2 94%   BMI 35.15 kg/m²   No intake or output data in the 24 hours ending 06/13/22 0918   Wt Readings from Last 3 Encounters:   06/13/22 245 lb (111.1 kg)   04/26/22 235 lb 14.3 oz (107 kg)   04/23/18 210 lb (95.3 kg)       General appearance:  Appears comfortable. AAOx3  HEENT: atraumatic, Pupils equal, muscous membranes moist, no masses appreciated  Cardiovascular: Regular rate and rhythm no murmurs appreciated  Respiratory: mild  wheezing  Gastrointestinal: Abdomen soft, non-tender, BS+  EXT: no edema  Neurology: no gross focal deficts  Psychiatry: Appropriate affect. Not agitated  Skin: Warm, dry, no rashes appreciated    Labs and Tests:  CBC:   Recent Labs     06/12/22  1422 06/13/22  0546   WBC 10.7 5.9   HGB 12.4* 12.6*    163     BMP:    Recent Labs     06/12/22  1422 06/13/22  0546    141   K 4.0 4.4    103   CO2 33* 31   BUN 14 16   CREATININE 1.1 0.9   GLUCOSE 131* 186*     Hepatic:   Recent Labs     06/12/22  1422   AST 22   ALT 13   BILITOT 0.5   ALKPHOS 77     XR CHEST PORTABLE   Final Result   Low lung volumes. Stable perihilar scarring or atelectasis. No acute   cardiopulmonary disease. Recent imaging reviewed    Problem List  Principal Problem:    Acute exacerbation of chronic obstructive pulmonary disease (COPD) ([facility] [address]  Resolved Problems:    * No resolved hospital problems.  *     Assessment/Plan:   Acute copd exacerbation with chronic hypoxic and hypercapnic respiratory failure  - duonebs  -iv steroids  - procal negative no further atbx  - speech eval     Tremors and possible seizure:  - seizure precautions  - neuro eval  - pt ot     Htn: home meds     Bph: home meds     DVT prophylaxis lovenox  Code status full code    Miah Moody MD   6/13/2022 9:18 AM

## 2022-06-13 NOTE — PROGRESS NOTES
[facility] Department   Phone: [phone]    Physical Therapy    [x] Initial Evaluation            [] Daily Treatment Note         [] Discharge Summary      Patient: Rosalva Watkins   : 1925   MRN: [id number]   Date of Service:  2022  Admitting Diagnosis: Acute exacerbation of chronic obstructive pulmonary disease (COPD) ([facility] [address]  Current Admission Summary: Jamir Abrams a 80 y. o. male who presents for evaluation of shakiness.  Patient states that he started feeling shaky a few days ago. Yohan Maillard he notices it more when he is trying to walk but is still ambulatory.  No dizziness/lightheadedness, focal weakness, visual disturbances, facial asymmetry, speech difficulty or syncope.  He denies any chest pain or shortness of breath, despite triage note. [facility] is on nasal cannula oxygen as needed at home at baseline.  He denies cough congestion runny nose.  No fevers or chills.  No abdominal pain nausea vomiting or diarrhea.  No history of similar symptoms.  States that his children just wanted him to get checked out. [facility] has no other complaints or concerns at this time  Past Medical History:  has a past medical history of BPH, Elevated prostate specific antigen (PSA), Esophageal reflux, Fracture closed, humerus, HTN (hypertension), Hyperlipidemia, Osteoarthritis, Peripheral neuropathy, Peripheral neuropathy, and Type II or unspecified type diabetes mellitus without mention of complication, not stated as uncontrolled. Past Surgical History:  has a past surgical history that includes knee surgery (); Ankle surgery (); Tonsillectomy (); shoulder surgery (02/09/10); Colonoscopy (10-02); Cataract removal (10-03); Total knee arthroplasty (07); Revision total knee arthroplasty (09); Revision total knee arthroplasty (09); and Total knee arthroplasty ().      Discharge Recommendations: Rosalva Watkins scored a 18/24 on the AM-PAC short mobility form. Current research shows that an AM-PAC score of 18 or greater is typically associated with a discharge to the patient's home setting. Based on the patient's AM-PAC score and their current functional mobility deficits, it is recommended that the patient have 2-3 sessions per week of Physical Therapy at d/c to increase the patient's independence. At this time, this patient demonstrates the endurance and safety to discharge home with HHPT and a follow up treatment frequency of 2-3x/wk. Please see assessment section for further patient specific details. If patient discharges prior to next session this note will serve as a discharge summary. Please see below for the latest assessment towards goals. HOME HEALTH CARE: LEVEL 3 SAFETY     - Initial home health evaluation to occur within 24-48 hours, in patient home   - Therapy evaluations in home within 24-48 hours of discharge; including DME and home safety   - Frontload therapy 5 days, then 3x a week   - Therapy to evaluate if patient has [address] needs for personal care   -  evaluation within 24-48 hours, includes evaluation of resources and insurance to determine AL, IL, LTC, and Medicaid options     DME Required For Discharge: rolling walker  Precautions/Restrictions: high fall risk  Positional Restrictions:no positional restrictions    Pre-Admission Information   Lives With: spouse                  Type of Home: house  Home Layout: one level  Home Access: level entry  Backtrace I/O: walker accessible, walk in shower  Toilet Height: standard height  Bathroom Equipment: grab bars in shower, grab bars around toilet, shower chair, hand held shower head  Home Equipment: rolling walker, reacher, oxygen, alert button, .   Comment: Uses RW sometimes if needed; on night O2,possibly 3.5 L  Transfer Assistance: Independent without use of device  Ambulation Assistance:modified independent with use of RW  ADL Assistance: independent with all ADL's  IADL Assistance: requires assistance with all homemaking tasks, [employer] comes 3 hrs per day for cooking,cleaning, laundry;dtr grocery shops  Active :        [x]? Yes                 []? No  Hand Dominance: []? Left                 []? Right  Current Employment: retired. Occupation: [employer]:  Used to drive for the [employer]  Recent Falls: 1 fall past 6 mos.     Examination   Vision:   Vision Corrective Device: wears glasses for distance   (Driving)  Hearing:   WFL (Notices some deterioration since group home)  Observation:   General Observation:  Pt is on 4L of O2  Sensation:   accurately detects gross touch to all extremities, pt reports neuropathy, pain in L toes at night only, takes medication  Proprioception:    WFL  ROM:   (B) LE AROM WFL  Strength:   (B) LE strength grossly WFL  Decision Making: medium complexity  Clinical Presentation: evolving      Subjective  General: Pt denies pain; seated in recliner upon arrival; agreeable to PT/OT  Pain: 0/10  Pain Interventions: not applicable       Functional Mobility  Bed Mobility  Bed mobility not completed on this date.   Comments:  Transfers  Sit to stand transfer: stand by assistance, contact guard assistance  Stand to sit transfer: stand by assistance, contact guard assistance  Comments: with RW from chair  Ambulation  Assistive Device: rolling walker  Assistance: stand by assistance, contact guard assistance  Distance: 100 ft with RW; 22 ft with no AD  Gait Mechanics: Pt amb into hallway with RW, increased eric, larger step lengths, flexed posture, cues to stay closer to RW; pt rested in chair; then amb short distance in room with no AD, shorter step lengths, slower eric, maintaining good balance   Comments:  Pt required CGA with RW due to anterior leaning, safety with RW; CGA-SBA for short distance with no AD  Balance  Static Sitting Balance: good: independent with functional balance in unsupported position  Dynamic Sitting Balance: poor (+): requires min (A) to maintain balance  Static Standing Balance: fair (-): maintains balance at CGA with use of UE support  Dynamic Standing Balance: fair (-): maintains balance at CGA with use of UE support  Comments: Pt stood without AD support to don mask, anterior-posterior sway using ankle strategy, CGA    Other Therapeutic Interventions    Functional Outcomes  AM-PAC Inpatient Mobility Raw Score : 25              Cognition  WFL  Orientation:    oriented to person, oriented to place, oriented to situation and disoriented to time ([place] month;stated August, then Sept.)  Command Following:   [facility]  Education  Barriers To Learning: none  Patient Education: patient educated on goals, PT role and benefits, plan of care, general safety, functional mobility training, transfer training, discharge recommendations  Learning Assessment:  patient verbalizes and demonstrates understanding    Assessment  Activity Tolerance: Tolerated well. 96-97% SPO2 on 4L O2. Impairments Requiring Therapeutic Intervention: decreased functional mobility, decreased strength, decreased safety awareness, decreased endurance, decreased balance  Prognosis: good  Clinical Assessment:  Pt is limited by the above deficits and would benefit from skilled PT services to maximize pt functional mobility and safety prior to discharge. Pt is currently below his baseline functional level and requiring 4L of O2. Recommend discharge home with HHPT to facilitate return to OF.      Safety Interventions: patient left in chair, chair alarm in place, call light within reach, gait belt, patient at risk for falls and nurse notified    Plan  Frequency: 3-5 x/per week  Current Treatment Recommendations: strengthening, balance training, functional mobility training, transfer training, gait training, endurance training and patient/caregiver education    Goals  Patient Goals: to return home   Short Term Goals:  Time Frame: Discharge   Patient will complete bed mobility at modified independent   Patient will complete transfers at [facility]   Patient will ambulate 100 ft with use of LRAD at [facility]    Therapy Session Time      Individual Group Co-treatment   Time In     1345   Time Out     1429   Minutes     44     Timed Code Treatment Minutes:  29 Minutes  Total Treatment Minutes:  44       Electronically Signed By: Daniela Ferrari PT

## 2022-06-13 NOTE — PROGRESS NOTES
Facility/Department: [address]  Initial Assessment  DYSPHAGIA BEDSIDE SWALLOW EVALUATION     Patient: Bhavin Sarmiento   : 1925   MRN: [id number]      Evaluation Date: 2022   Admitting Diagnosis: Wheezing [R06.2]  Bronchospasm [J98.01]  Hypercapnia [R06.89]  Acute exacerbation of chronic obstructive pulmonary disease (COPD) ([facility] [address] [J44.1]  Aspiration into airway, initial encounter [T17.078A]  Pain: Denies                                                       H&P:  Leretha Lundborg is a 80 y.o. male who states has been having shortness of breath for the last couple weeks along with some shakes that started yesterday. Patient denies any fevers chills cough nausea vomiting or diarrhea or sick contacts. The patient states his kids brought him in for shakes. The patient's family is not in the room per the ED provider spoke with this the patient having twitching in arms moving for about 10 seconds possibly aspirated at that time bulb but no tongue biting or loss of urine. The patient does use 2 L of oxygen normally at home no prior history of seizure no diagnosis of COPD never smoked but father did smoke around him. \"     Imaging:  Chest X-ray:   Impression   Low lung volumes.  Stable perihilar scarring or atelectasis.  No acute   cardiopulmonary disease. Prior Modified Barium Swallow Study:  \"Modified Barium Swallow evaluation completed on 2022. Patient presents with oropharyngeal dysphagia secondary to prolonged mastication, prolonged A-P bolus propulsion, premature bolus loss, minimally delayed swallow initiation, decreased tongue base retraction, decreased epiglottic ROM, decreased pharyngeal stripping wave and reduced pharyngoesophageal segment opening complicated by advanced age, comorbidities resulting in observed intermittent laryngeal penetration with thin liquids (chin tuck and neutral head posture) and collection of vallecular and pyriform stasis (soft solids, solids).  No aspiration was viewed during this study. Patient is at an increased risk of aspiration due to intermittent laryngeal penetration with thin liquids. Previous MBS recommended chin tuck with thin liquids, trials of thins this date were presented with head in neutral position and with strategy of chin tuck. Intermittent laryngeal penetration was assessed during the swallow with both head postures, laryngeal penetration appeared to be due to delayed swallow initiation and decreased airway closure. After the swallow with thin liquids questionable retrograde flow was noted to the pyriform from esophagus. Collection of vallecular residue was assessed with all textures (increased residue was noted with solids) this appeared to be due to decreased tongue base retraction, reduced epiglottic inversion and decreased pharyngoesophageal segment opening. Pt appeared to benefit from use of liquid wash or double swallow. He appeared to demonstrate decreased sensation for pharyngeal stasis. Following study throat clearing was noted question if this was due to laryngeal penetration, pharyngeal stasis or possible reflux. See below for recommendations. \"     History/Prior Level of Function:   Living Status: independent living with wife   Prior Dysphagia History: Known to us with prior hx of dysphagia. Most recent MBS completed 4/2022, see above for recommendations. Pt largely denies any difficulty with swallowing and does not restrict his diet at home. Reason for referral: SLP evaluation orders received due to prior hx of dysphagia  and suspected aspiration event     Dysphagia Impressions/Diagnosis: Oropharyngeal Dysphagia   Pt was positioned upright in chair on O2 via nasal cannula. Oral mechanism exam; adequate dentition, grossly WNL for ROM/coordination. Various consistency trials were provided to assess swallow function. Pt demonstrated adequate but minimally prolonged mastication with effective oral clearing.  Swallow appeared minimally delayed. No immediate overt clinical s/s of aspriation/penetration were assessed however, pt did demonstrated delayed vocal quality change and delayed congested coughing a few minutes post PO trials. During prior MBS pt demonstrated pharyngeal residue with decreased sensation for clearing, potential backflow/reflux and intermittent laryngeal penetration with thin liquids, these deficits may correlate with pt's ongoing coughing and aspiration risk. Recommend continuation of current diet level with safe swallow strategies. Recommended Diet and Intervention 6/13/2022:  Diet Solids Recommendation:  Regular texture diet  Liquid Consistency Recommendation: Thin liquids  Recommended form of Meds: Whole with water      Pt may benefit from repeat Modified Barium Swallow to re-assess pharyngeal phase of swallow, will continue to monitor    Compensatory Swallowing Strategies: Alternate solids/liquids , Upright as possible with all PO intake , Small bites/sips , Swallow 2 times per bite , Eat/feed slowly, Remain upright 30-45 min , Cough/re-swallow     SHORT TERM DYSPHAGIA GOALS/PLAN OF CARE: Speech therapy for dysphagia tx 3-5 times per week during acute care stay.     Pt will functionally tolerate recommended diet with no overt clinical s/s of aspiration   If clinical s/s of aspiration/penetration continue to be noted, Pt will participate in Modified Barium Swallow Study     Dysphagia Therapeutic Intervention:  Diet Tolerance Monitoring , Patient/Family Education , Therapeutic Trials with SLP     Discharge Recommendations: Discharge recommendations to be determined pending ongoing follow-up during acute care stay    Patient Positioning: Upright in chair    Current Diet Level (prior to evaluation): Regular texture diet  Thin liquids      Respiratory Status:   []Room Air   [x]O2 via nasal cannula   []Other:    Dentition:  [x]Adequate  []Dentures   []Missing Many Teeth  []Edentulous  []Other:    Baseline Vocal Quality:  []Normal  []Dysphonic   []Aphonic   []Hoarse  []Wet  []Weak  [x]Other: gravelly     Volitional Cough:  [x]Strong  []Weak  []Wet  []Absent  [x]Congested  []Other:    Volitional Swallow:   []Absent   []Delayed     [x]Adequate     []Required use of drink     Oral Mechanism Exam:  [x]WFL []Mild   [] Moderate  []Severe  []To be assessed  Impaired:   []Left side      []Right side    []Labial ROM/Coordination    []Labial Symmetry   []Lingual ROM/Coordination   []Lingual Symmetry  []Gag  []Other:     Oral Phase: []WFL [x]Mild   [] Moderate  []Severe  []To be assessed   [x]Impaired/Prolonged Mastication:   []Oral Holding:   []Spillage Left:   []Spillage Right:  []Pocketing Left:   []Pocketing Right:   []Decreased Anterior to Posterior Transit:   []Suspected Premature Bolus Loss:   []Lingual/Palatal Residue:   []Other:     Pharyngeal Phase: []WFL [x]Mild   [] Moderate  []Severe  []To be assessed   [x]Delayed Swallow:   [x]Suspected Pharyngeal Pooling:   []Decreased Laryngeal Elevation:   []Absent Swallow:  []Wet Vocal Quality:   []Throat Clearing-Immediate:   []Throat Clearing-Delayed:   []Cough-Immediate:   []Cough-Delayed:  []Change in Vital Signs:  [x]Suspected Delayed Pharyngeal Clearing:  []Other:       Eating Assistance:  [x]Independent  []Setup or clean-up assistance   [] Supervision or touching assistance   [] Partial or moderate assistance   [] Substantial or maximal assistance  [] Dependent       EDUCATION:   Provided education regarding role of SLP, results of assessment, recommendations and general speech pathology plan of care. [x] Pt verbalized understanding and agreement   [x] Pt requires ongoing learning   [] No evidence of comprehension     If patient discharges prior to next visit, this note will serve as discharge.      Timed Code Minutes: 0 minutes   Total Treatment Minutes: 30 minutes     Electronically signed by:  Lluvia Gallego MA [address] [place] [address]  Speech Language Pathologist

## 2022-06-14 ENCOUNTER — APPOINTMENT (OUTPATIENT)
Dept: GENERAL RADIOLOGY | Age: 87
DRG: 191 | End: 2022-06-14
Payer: MEDICARE

## 2022-06-14 LAB
ANION GAP SERPL CALCULATED.3IONS-SCNC: 8 MMOL/L (ref 3–16)
BASOPHILS ABSOLUTE: 0 K/UL (ref 0–0.2)
BASOPHILS RELATIVE PERCENT: 0.1 %
BUN BLDV-MCNC: 20 MG/DL (ref 7–20)
CALCIUM SERPL-MCNC: 9.3 MG/DL (ref 8.3–10.6)
CHLORIDE BLD-SCNC: 102 MMOL/L (ref 99–110)
CO2: 32 MMOL/L (ref 21–32)
CREAT SERPL-MCNC: 0.9 MG/DL (ref 0.8–1.3)
EKG ATRIAL RATE: 68 BPM
EKG DIAGNOSIS: NORMAL
EKG P AXIS: 26 DEGREES
EKG P-R INTERVAL: 260 MS
EKG Q-T INTERVAL: 384 MS
EKG QRS DURATION: 112 MS
EKG QTC CALCULATION (BAZETT): 408 MS
EKG R AXIS: -38 DEGREES
EKG T AXIS: 55 DEGREES
EKG VENTRICULAR RATE: 68 BPM
EOSINOPHILS ABSOLUTE: 0 K/UL (ref 0–0.6)
EOSINOPHILS RELATIVE PERCENT: 0.2 %
GFR AFRICAN AMERICAN: >60
GFR NON-AFRICAN AMERICAN: >60
GLUCOSE BLD-MCNC: 123 MG/DL (ref 70–99)
GLUCOSE BLD-MCNC: 131 MG/DL (ref 70–99)
GLUCOSE BLD-MCNC: 145 MG/DL (ref 70–99)
GLUCOSE BLD-MCNC: 165 MG/DL (ref 70–99)
GLUCOSE BLD-MCNC: 197 MG/DL (ref 70–99)
HCT VFR BLD CALC: 39.4 % (ref 40.5–52.5)
HEMOGLOBIN: 12.8 G/DL (ref 13.5–17.5)
LYMPHOCYTES ABSOLUTE: 1.1 K/UL (ref 1–5.1)
LYMPHOCYTES RELATIVE PERCENT: 13.2 %
MCH RBC QN AUTO: 28.8 PG (ref 26–34)
MCHC RBC AUTO-ENTMCNC: 32.4 G/DL (ref 31–36)
MCV RBC AUTO: 88.9 FL (ref 80–100)
MONOCYTES ABSOLUTE: 0.5 K/UL (ref 0–1.3)
MONOCYTES RELATIVE PERCENT: 6.5 %
NEUTROPHILS ABSOLUTE: 6.5 K/UL (ref 1.7–7.7)
NEUTROPHILS RELATIVE PERCENT: 80 %
PDW BLD-RTO: 16.6 % (ref 12.4–15.4)
PERFORMED ON: ABNORMAL
PLATELET # BLD: 180 K/UL (ref 135–450)
PMV BLD AUTO: 8 FL (ref 5–10.5)
POTASSIUM REFLEX MAGNESIUM: 3.9 MMOL/L (ref 3.5–5.1)
RBC # BLD: 4.44 M/UL (ref 4.2–5.9)
SODIUM BLD-SCNC: 142 MMOL/L (ref 136–145)
WBC # BLD: 8.2 K/UL (ref 4–11)

## 2022-06-14 PROCEDURE — 94640 AIRWAY INHALATION TREATMENT: CPT

## 2022-06-14 PROCEDURE — 6370000000 HC RX 637 (ALT 250 FOR IP): Performed by: INTERNAL MEDICINE

## 2022-06-14 PROCEDURE — 80048 BASIC METABOLIC PNL TOTAL CA: CPT

## 2022-06-14 PROCEDURE — 1200000000 HC SEMI PRIVATE

## 2022-06-14 PROCEDURE — 2580000003 HC RX 258: Performed by: INTERNAL MEDICINE

## 2022-06-14 PROCEDURE — 97535 SELF CARE MNGMENT TRAINING: CPT

## 2022-06-14 PROCEDURE — 85025 COMPLETE CBC W/AUTO DIFF WBC: CPT

## 2022-06-14 PROCEDURE — 92526 ORAL FUNCTION THERAPY: CPT

## 2022-06-14 PROCEDURE — 97530 THERAPEUTIC ACTIVITIES: CPT

## 2022-06-14 PROCEDURE — 36415 COLL VENOUS BLD VENIPUNCTURE: CPT

## 2022-06-14 PROCEDURE — 93010 ELECTROCARDIOGRAM REPORT: CPT | Performed by: INTERNAL MEDICINE

## 2022-06-14 PROCEDURE — 6370000000 HC RX 637 (ALT 250 FOR IP): Performed by: NURSE PRACTITIONER

## 2022-06-14 PROCEDURE — 94660 CPAP INITIATION&MGMT: CPT

## 2022-06-14 PROCEDURE — 92611 MOTION FLUOROSCOPY/SWALLOW: CPT

## 2022-06-14 PROCEDURE — 74230 X-RAY XM SWLNG FUNCJ C+: CPT

## 2022-06-14 PROCEDURE — 2700000000 HC OXYGEN THERAPY PER DAY

## 2022-06-14 PROCEDURE — 6360000002 HC RX W HCPCS: Performed by: INTERNAL MEDICINE

## 2022-06-14 PROCEDURE — 97116 GAIT TRAINING THERAPY: CPT

## 2022-06-14 RX ORDER — LORAZEPAM 1 MG/1
1 TABLET ORAL ONCE
Status: COMPLETED | OUTPATIENT
Start: 2022-06-14 | End: 2022-06-14

## 2022-06-14 RX ADMIN — GABAPENTIN 100 MG: 100 CAPSULE ORAL at 20:46

## 2022-06-14 RX ADMIN — AMITRIPTYLINE HYDROCHLORIDE 10 MG: 10 TABLET, FILM COATED ORAL at 20:46

## 2022-06-14 RX ADMIN — INSULIN LISPRO 1 UNITS: 100 INJECTION, SOLUTION INTRAVENOUS; SUBCUTANEOUS at 17:08

## 2022-06-14 RX ADMIN — FINASTERIDE 5 MG: 5 TABLET, FILM COATED ORAL at 09:07

## 2022-06-14 RX ADMIN — IPRATROPIUM BROMIDE AND ALBUTEROL SULFATE 1 AMPULE: 2.5; .5 SOLUTION RESPIRATORY (INHALATION) at 21:32

## 2022-06-14 RX ADMIN — THIAMINE HCL TAB 100 MG 100 MG: 100 TAB at 09:07

## 2022-06-14 RX ADMIN — Medication 3 MG: at 22:41

## 2022-06-14 RX ADMIN — IPRATROPIUM BROMIDE AND ALBUTEROL SULFATE 1 AMPULE: 2.5; .5 SOLUTION RESPIRATORY (INHALATION) at 16:10

## 2022-06-14 RX ADMIN — SODIUM CHLORIDE, PRESERVATIVE FREE 10 ML: 5 INJECTION INTRAVENOUS at 09:13

## 2022-06-14 RX ADMIN — INSULIN LISPRO 1 UNITS: 100 INJECTION, SOLUTION INTRAVENOUS; SUBCUTANEOUS at 20:46

## 2022-06-14 RX ADMIN — ENOXAPARIN SODIUM 40 MG: 100 INJECTION SUBCUTANEOUS at 09:14

## 2022-06-14 RX ADMIN — LOSARTAN POTASSIUM 50 MG: 25 TABLET, FILM COATED ORAL at 09:07

## 2022-06-14 RX ADMIN — Medication 250 MCG: at 09:08

## 2022-06-14 RX ADMIN — AMLODIPINE BESYLATE 10 MG: 5 TABLET ORAL at 09:07

## 2022-06-14 RX ADMIN — GABAPENTIN 100 MG: 100 CAPSULE ORAL at 09:07

## 2022-06-14 RX ADMIN — INSULIN LISPRO 1 UNITS: 100 INJECTION, SOLUTION INTRAVENOUS; SUBCUTANEOUS at 12:58

## 2022-06-14 RX ADMIN — GABAPENTIN 100 MG: 100 CAPSULE ORAL at 12:58

## 2022-06-14 RX ADMIN — METHYLPREDNISOLONE SODIUM SUCCINATE 40 MG: 40 INJECTION, POWDER, FOR SOLUTION INTRAMUSCULAR; INTRAVENOUS at 09:12

## 2022-06-14 RX ADMIN — LORAZEPAM 1 MG: 1 TABLET ORAL at 04:17

## 2022-06-14 RX ADMIN — IPRATROPIUM BROMIDE AND ALBUTEROL SULFATE 1 AMPULE: 2.5; .5 SOLUTION RESPIRATORY (INHALATION) at 08:44

## 2022-06-14 RX ADMIN — GABAPENTIN 100 MG: 100 CAPSULE ORAL at 17:08

## 2022-06-14 RX ADMIN — PANTOPRAZOLE SODIUM 40 MG: 40 TABLET, DELAYED RELEASE ORAL at 04:11

## 2022-06-14 RX ADMIN — SODIUM CHLORIDE, PRESERVATIVE FREE 10 ML: 5 INJECTION INTRAVENOUS at 20:46

## 2022-06-14 NOTE — PROGRESS NOTES
[facility]    6/14/2022 9:23 AM        Name: Miranda Peoples .               Admitted: 6/12/2022  Primary Care Provider: Kendy Medina MD (Tel: [phone])        Subjective:    Shortness of breath improving no nausea vomiting chest    Reviewed interval ancillary notes    Current Medications  losartan (COZAAR) tablet 50 mg, Daily  amitriptyline (ELAVIL) tablet 10 mg, Nightly  albuterol (PROVENTIL) nebulizer solution 5 mg, Q6H PRN  ipratropium-albuterol (DUONEB) nebulizer solution 1 ampule, Q4H WA  amLODIPine (NORVASC) tablet 10 mg, Daily  vitamin B-12 (CYANOCOBALAMIN) tablet 250 mcg, Daily  finasteride (PROSCAR) tablet 5 mg, Daily  gabapentin (NEURONTIN) capsule 100 mg, 4x Daily  pantoprazole (PROTONIX) tablet 40 mg, Daily  thiamine mononitrate tablet 100 mg, Daily  methylPREDNISolone sodium (SOLU-MEDROL) injection 40 mg, Daily  sodium chloride flush 0.9 % injection 5-40 mL, 2 times per day  sodium chloride flush 0.9 % injection 5-40 mL, PRN  0.9 % sodium chloride infusion, PRN  enoxaparin (LOVENOX) injection 40 mg, Daily  ondansetron (ZOFRAN-ODT) disintegrating tablet 4 mg, Q8H PRN   Or  ondansetron (ZOFRAN) injection 4 mg, Q6H PRN  polyethylene glycol (GLYCOLAX) packet 17 g, Daily PRN  acetaminophen (TYLENOL) tablet 650 mg, Q6H PRN   Or  acetaminophen (TYLENOL) suppository 650 mg, Q6H PRN  insulin lispro (HUMALOG) injection vial 0-6 Units, TID WC  insulin lispro (HUMALOG) injection vial 0-3 Units, Nightly  dextrose bolus 10% 125 mL, PRN   Or  dextrose bolus 10% 250 mL, PRN  glucagon (rDNA) injection 1 mg, PRN  dextrose 5 % solution, PRN  melatonin tablet 3 mg, Nightly PRN        Objective:  BP (!) 167/69   Pulse 75   Temp 98.2 °F (36.8 °C) (Oral)   Resp 16   Ht 5' 10\" (1.778 m)   Wt 248 lb 9.6 oz (112.8 kg)   SpO2 96%   BMI 35.67 kg/m²   No intake or output data in the 24 hours ending 06/14/22 0923   Wt Readings from Last 3 Encounters:   06/14/22 248 lb 9.6 oz (112.8 kg)   04/26/22 235 lb 14.3 oz (107 kg)   04/23/18 210 lb (95.3 kg)       General appearance:  Appears comfortable. AAOx3  HEENT: atraumatic, Pupils equal, muscous membranes moist, no masses appreciated  Cardiovascular: Regular rate and rhythm no murmurs appreciated  Respiratory: trace  wheezing  Gastrointestinal: Abdomen soft, non-tender, BS+  EXT: no edema  Neurology: no gross focal deficts  Psychiatry: Appropriate affect. Not agitated  Skin: Warm, dry, no rashes appreciated    Labs and Tests:  CBC:   Recent Labs     06/12/22  1422 06/13/22  0546 06/14/22  0614   WBC 10.7 5.9 8.2   HGB 12.4* 12.6* 12.8*    163 180     BMP:    Recent Labs     06/12/22  1422 06/13/22  0546 06/14/22  0614    141 142   K 4.0 4.4 3.9    103 102   CO2 33* 31 32   BUN 14 16 20   CREATININE 1.1 0.9 0.9   GLUCOSE 131* 186* 131*     Hepatic:   Recent Labs     06/12/22  1422   AST 22   ALT 13   BILITOT 0.5   ALKPHOS 77     XR CHEST PORTABLE   Final Result   Low lung volumes. Stable perihilar scarring or atelectasis. No acute   cardiopulmonary disease. Recent imaging reviewed    Problem List  Principal Problem:    Acute exacerbation of chronic obstructive pulmonary disease (COPD) ([facility] [address]  Active Problems:    New onset seizure ([facility] [address]  Resolved Problems:    * No resolved hospital problems.  *     Assessment/Plan:   Acute copd exacerbation with chronic hypoxic and hypercapnic respiratory failure baseline 3 liters  - duonebs  -iv steroids  - procal negative no further atbx  - modified barium swallow ordered with fu     Tremors and possible seizure:  - seizure precautions  - improved appreciate neuro recs     Htn: home meds, slightly elevated this mornign will monitor and tirate up meds as needed     Bph: home meds     DVT prophylaxis lovenox  Code status full code    Gwynda Baumgarten, MD   6/14/2022 9:23 AM

## 2022-06-14 NOTE — PROGRESS NOTES
[facility] Department   Phone: [phone]    Physical Therapy    [] Initial Evaluation            [x] Daily Treatment Note         [] Discharge Summary      Patient: Osvaldo Hartmann   : 1925   MRN: [id number]   Date of Service:  2022  Admitting Diagnosis: Acute exacerbation of chronic obstructive pulmonary disease (COPD) ([facility] [address]  Current Admission Summary: Venecia Kind a 80 y. o. male who presents for evaluation of shakiness.  Patient states that he started feeling shaky a few days ago. Neomia Embudo he notices it more when he is trying to walk but is still ambulatory.  No dizziness/lightheadedness, focal weakness, visual disturbances, facial asymmetry, speech difficulty or syncope.  He denies any chest pain or shortness of breath, despite triage note. Mey Garcia is on nasal cannula oxygen as needed at home at baseline.  He denies cough congestion runny nose.  No fevers or chills.  No abdominal pain nausea vomiting or diarrhea.  No history of similar symptoms.  States that his children just wanted him to get checked out. Mey Garcia has no other complaints or concerns at this time  Past Medical History:  has a past medical history of BPH, Elevated prostate specific antigen (PSA), Esophageal reflux, Fracture closed, humerus, HTN (hypertension), Hyperlipidemia, Osteoarthritis, Peripheral neuropathy, Peripheral neuropathy, and Type II or unspecified type diabetes mellitus without mention of complication, not stated as uncontrolled. Past Surgical History:  has a past surgical history that includes knee surgery (); Ankle surgery (); Tonsillectomy (); shoulder surgery (02/09/10); Colonoscopy (10-02); Cataract removal (10-03); Total knee arthroplasty (07); Revision total knee arthroplasty (09); Revision total knee arthroplasty (09); and Total knee arthroplasty ().      Discharge Recommendations: Osvaldo Hartmann scored a 18/24 on the AM-PAC short mobility form. Current research shows that an AM-PAC score of 18 or greater is typically associated with a discharge to the patient's home setting. Based on the patient's AM-PAC score and their current functional mobility deficits, it is recommended that the patient have 2-3 sessions per week of Physical Therapy at d/c to increase the patient's independence. At this time, this patient demonstrates the endurance and safety to discharge home with HHPT and a follow up treatment frequency of 2-3x/wk. Please see assessment section for further patient specific details. If patient discharges prior to next session this note will serve as a discharge summary. Please see below for the latest assessment towards goals. HOME HEALTH CARE: LEVEL 3 SAFETY     - Initial home health evaluation to occur within 24-48 hours, in patient home   - Therapy evaluations in home within 24-48 hours of discharge; including DME and home safety   - Frontload therapy 5 days, then 3x a week   - Therapy to evaluate if patient has [address] needs for personal care   -  evaluation within 24-48 hours, includes evaluation of resources and insurance to determine [place], [place], LTC, and Medicaid options     DME Required For Discharge: rolling walker  Precautions/Restrictions: high fall risk  Positional Restrictions:no positional restrictions    Pre-Admission Information   Lives With: spouse                  Type of Home: house  Home Layout: one level  Home Access: level entry  [email or web address]: walker accessible, walk in shower  Toilet Height: standard height  Bathroom Equipment: grab bars in shower, grab bars around toilet, shower chair, hand held shower head  Home Equipment: rolling walker, reacher, oxygen, alert button, .   Comment: Uses RW sometimes if needed; on night O2,possibly 3.5 L  Transfer Assistance: Independent without use of device  Ambulation Assistance:modified independent with use of RW  ADL Assistance: independent with all ADL's  IADL Assistance: requires assistance with all homemaking tasks, [employer] comes 3 hrs per day for cooking,cleaning, laundry;dtr grocery shops  Active :        [x]? Yes                 []? No  Hand Dominance: []? Left                 []? Right  Current Employment: retired. Occupation: Finance for [employer]:  Used to drive for the [employer]  Recent Falls: 1 fall past 6 mos. Subjective  General: Pt denies pain; seated in recliner upon arrival; agreeable to PT/OT  Pain: 0/10  Pain Interventions: not applicable       Functional Mobility  Bed Mobility  Bed mobility not completed on this date. Comments:  Transfers  Sit to stand transfer: stand by assistance  Stand to sit transfer: stand by assistance  Comments: with RW from chair  Ambulation  Assistive Device: no device  Assistance: contact guard assistance  Distance: 10 ft without AD RW; ~300 ft with RW  Gait Mechanics: Pt demonstrates improved tolerance to ambulation with increased distance, continues with anterior leaning with RW, mild unsteadiness without AD.     Comments:  Pt required CGA with RW due to anterior leaning, safety with RW; CGA for short distance with no AD  Balance  Static Sitting Balance: good: independent with functional balance in unsupported position  Dynamic Sitting Balance: poor (+): requires min (A) to maintain balance  Static Standing Balance: fair (-): maintains balance at CGA with use of UE support  Dynamic Standing Balance: fair (-): maintains balance at CGA with use of UE support  Comments: Pt stood without AD support to don mask, anterior-posterior sway using ankle strategy, CGA    Other Therapeutic Interventions    Functional Outcomes                 Cognition  WFL  Orientation:    oriented to person, oriented to place, oriented to situation and disoriented to time (Knows year and month)  Command Following:   [facility]  Education  Barriers To Learning: none  Patient Education: patient educated on goals, PT role and benefits, plan of care, general safety, functional mobility training, transfer training, discharge recommendations  Learning Assessment:  patient verbalizes and demonstrates understanding    Assessment  Activity Tolerance: Tolerated well. 96-97% SPO2 on 4L O2. Impairments Requiring Therapeutic Intervention: decreased functional mobility, decreased strength, decreased safety awareness, decreased endurance, decreased balance  Prognosis: good  Clinical Assessment:  Pt is limited by the above deficits and would benefit from skilled PT services to maximize pt functional mobility and safety prior to discharge. Pt is currently below his baseline functional level and requiring 4L of O2. Recommend discharge home with HHPT to facilitate return to OF.      Safety Interventions: patient left in chair, chair alarm in place, call light within reach, gait belt, patient at risk for falls and nurse notified    Plan  Frequency: 3-5 x/per week  Current Treatment Recommendations: strengthening, balance training, functional mobility training, transfer training, gait training, endurance training and patient/caregiver education    Goals  Patient Goals: to return home   Short Term Goals:  Time Frame: Discharge   Patient will complete bed mobility at modified independent   Patient will complete transfers at [facility]   Patient will ambulate 100 ft with use of LRAD at [facility]   **none met this date    Therapy Session Time      Individual Group Co-treatment   Time In      1128   Time Out      1157   Minutes      29     Timed Code Treatment Minutes:   29  Total Treatment Minutes:  29       Electronically Signed By: Mi Olson, PT   Mi Olson PT, DPT, [id number]

## 2022-06-14 NOTE — PROGRESS NOTES
Pt showing signs of sundowners. Keeps getting worse each night he has been with me. He has gone from the bed to the chair many times, and bumped his knee that now has a skin tear. Won't listen to direction, and is argumentative. He doesn't know where he is at at times. He gets so worked up he turns red and gets SOB. Noted PT scored him an 18/24 and can go home. This needs to be revaluated, based on my assessments the patient is very weak, almost fell multiple times and he is not cognitive enough to be without assistance at night.

## 2022-06-14 NOTE — PROCEDURES
Facility/Department: [address]  MODIFIED BARIUM SWALLOW EVALUATION    Patient: Ashley Elizabeth   : 1925   MRN: [id number]      Evaluation Date: 2022   Admitting Diagnosis: Wheezing [R06.2]  Bronchospasm [J98.01]  Hypercapnia [R06.89]  Acute exacerbation of chronic obstructive pulmonary disease (COPD) (HCC) [J44.1]  Aspiration into airway, initial encounter [T17.908A]  Treatment Diagnosis: Dysphagia   Pain: denies                          Ordering MD: Dr. Jenn Hunter   Date of Evaluation: 2022  Type of Study: Modified Barium Swallowing Study (MBS)  Diet Prior to Study:  Regular texture diet with thin liquids     Impression:  Modified Barium Swallow evaluation completed on 2022. Patient presents with oropharyngeal dysphagia secondary to prolonged mastication, decreased A-P bolus transport, intermittent pharyngeal pooling, minimally delayed swallow initiation, diminished pharyngeal stripping wave, decreased laryngeal elevation, decreased epiglottic inversion complicated by advanced age, co morbidities resulting in observed intermittent laryngeal penetration of thin liquids and trace/minimal pharyngeal residue. Laryngeal penetration was noted during the swallow with thin liquids, material entered the airway, remained above the vocal folds and was ejected from the airway. laryngeal penetration with thin liquids appeared to be due to pharyngeal pooling with delayed swallow initiation and decreased laryngeal elevation. No aspiration was viewed. After the swallow intermittent trace to mild pharyngeal stasis was noted. Patient is at an increased risk of aspiration due to intermittent laryngeal penetration and decreased airway closure. The results of this study represent improvement from prior MBS completed 2022. See below for recommendations.      Aspiration/Penetration Risk:  Minimal     Recommendations:    Diet Level:   Regular texture diet  with Thin liquids   Medication: Whole with water or Meds in puree     Strategies: Alternate solids/liquids , Upright as possible with all PO intake , Swallow 2 times per bite , Eat/feed slowly, Remain upright 30-45 min     Treatments: Ongoing dysphagia therapy with SLP   Goals:  Recommend ongoing SLP to address dysphagia with goals per primary treating SLP    Pt/family will demonstrate understanding of results Modified Barium Swallow Study, risk for aspiration, rationale for diet level and compensatory strategies    Consistencies given: thin liquids, puree , soft solids and regular solids     Oral Phase  Prolonged/impaired mastication; slow prolonged chewing/mashing with complete re-collection    Intermittent Premature bolus loss   Prolonged//Impaired A-P bolus transport     Pharyngeal Phase  Pharyngeal pooling prior to swallow initiation   Delayed swallow initiation   Decreased anterior hyoid movement   Diminished pharyngeal stripping wave   Decreased laryngeal elevation   Decreased epiglottic inversion   Pharyngeal residue     Penetration/Aspiration Scores across consistencies   CONSISTENCY  Pen/Asp rating Description    Thin   2) Material enters the airway, remains above the vocal folds, and is ejected from the airway     Puree   1) Material does not enter the airway     Soft Solid   1) Material does not enter the airway     Regular Solid   1) Material does not enter the airway            Esophageal Phase  Unremarkable    Following Evaluation:  Provided education regarding role of SLP, results of assessment, recommendations and general speech pathology plan of care.    [x] Pt verbalized understanding and agreement   [x] Pt requires ongoing learning   [] No evidence of comprehension     Timed Code Treatment:  0 minutes     Total Treatment Time:  30 minutes     Signature:  Helena Goodell, MA CCC-SLP #[id number]  Speech Language Pathologist

## 2022-06-14 NOTE — PROGRESS NOTES
[facility]   Phone: [phone]    Occupational Therapy    [] Initial Evaluation            [x] Daily Treatment Note         [] Discharge Summary      Patient: Edna Escobar   : 1925   MRN: [id number]   Date of Service:  2022    Admitting Diagnosis:  Acute exacerbation of chronic obstructive pulmonary disease (COPD) ([facility] [address]  Current Admission Summary: Edna Escobar is a 80 y.o. male who presents for evaluation of shakiness. Patient states that he started feeling shaky a few days ago. States he notices it more when he is trying to walk but is still ambulatory. No dizziness/lightheadedness, focal weakness, visual disturbances, facial asymmetry, speech difficulty or syncope. He denies any chest pain or shortness of breath, despite triage note. He is on nasal cannula oxygen as needed at home at baseline. He denies cough congestion runny nose. No fevers or chills. No abdominal pain nausea vomiting or diarrhea. No history of similar symptoms. States that his children just wanted him to get checked out. He has no other complaints or concerns at this time.     Past Medical History:  has a past medical history of BPH, Elevated prostate specific antigen (PSA), Esophageal reflux, Fracture closed, humerus, HTN (hypertension), Hyperlipidemia, Osteoarthritis, Peripheral neuropathy, Peripheral neuropathy, and Type II or unspecified type diabetes mellitus without mention of complication, not stated as uncontrolled. Past Surgical History:  has a past surgical history that includes knee surgery (); Ankle surgery (); Tonsillectomy (); shoulder surgery (02/09/10); Colonoscopy (10-02); Cataract removal (10-03); Total knee arthroplasty (07); Revision total knee arthroplasty (09); Revision total knee arthroplasty (09); and Total knee arthroplasty ().     Discharge Recommendations: Edna Escobar scored a 18/24 on the AM-PAC ADL Inpatient form. Current research shows that an AM-PAC score of 18 or greater is typically associated with a discharge to the patient's home setting. Based on the patient's AM-PAC score, and their current ADL deficits, it is recommended that the patient have 2-3 sessions per week of Occupational Therapy at d/c to increase the patient's independence. At this time, this patient demonstrates the endurance and safety to discharge home with home services and a follow up treatment frequency of 2-3x/wk. Please see assessment section for further patient specific details. HOME HEALTH CARE: LEVEL 3 SAFETY     - Initial home health evaluation to occur within 24-48 hours, in patient home   - Therapy evaluations in home within 24-48 hours of discharge; including DME and home safety   - Frontload therapy 5 days, then 3x a week   - Therapy to evaluate if patient has [address] needs for personal care   -  evaluation within 24-48 hours, includes evaluation of resources and insurance to determine [place], [place], [place], and Medicaid options     If patient discharges prior to next session this note will serve as a discharge summary. Please see below for the latest assessment towards goals. DME Required For Discharge: no DME required at discharge    Precautions/Restrictions: high fall risk  Positional Restrictions:no positional restrictions    Pre-Admission Information   Lives With: spouse    Type of Home: house  Home Layout: one level  Home Access: level entry  [facility]: walker accessible, walk in shower  Toilet Height: standard height  Bathroom Equipment: grab bars in shower, grab bars around toilet, shower chair, hand held shower head  Home Equipment: rolling walker, reacher, oxygen, alert button, .   Comment: Uses RW sometimes if needed; on night O2,possibly 3.5 L  Transfer Assistance: Independent without use of device  Ambulation Assistance:modified independent with use of RW  ADL Assistance: independent with all ADL's  IADL Assistance: requires assistance with all homemaking tasks, [employer] comes 3 hrs per day for cooking,cleaning, laundry;dtr grocery shops  Active :        [x] Yes  [] No  Hand Dominance: [] Left  [] Right  Current Employment: retired. 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 Subjective  General: Pt seated in recliner on arrival, agreeable to OT tx, perseverating on sleep study appt but able to be redirected. Pain: 0/10  Pain Interventions: not applicable        Activities of Daily Living  Basic Activities of Daily Living  Grooming: stand by assistance  Grooming Comments: oral hygiene stance at sink  Instrumental Activities of Daily Living  No IADL completed on this date. Functional Mobility  Bed Mobility  Bed mobility not completed on this date. Comments:  Transfers  Sit to stand transfer:stand by assistance  Stand to sit transfer: stand by assistance  Comments: impulsive with STS throughout session  Functional Mobility:  Standing Balance: stand by assistance. Standing Balance Comment: w/RW  Functional Mobility: rolling walker. contact guard assistance. Activity: to/from bathroom CGA and no device, hallway amb CGA--one minor LOB requiring CGA   Comment: Cues to stand closer to walker. Functional Outcomes  AM-PAC Inpatient Daily Activity Raw Score: 18    Cognition  WFL  Orientation:    oriented to person, oriented to place, oriented to time and oriented to situation  Command Following:   [facility]     Education  Barriers To Learning: none  Patient Education: patient educated on OT role and benefits, plan of care, adaptive device training, transfer training, discharge recommendations  Learning Assessment:  patient verbalizes and demonstrates understanding    Assessment  Activity Tolerance: Tolerated well.    Impairments Requiring Therapeutic Intervention: decreased functional mobility, decreased ADL status, decreased endurance  Prognosis: good  Clinical Assessment: Pt is below his baseline level of occupational function, based on the above deficits associated with acute exacerbation of COPD. Continues to present with impulsive behaviors but able to ambulate with RW and CGA. Pt would benefit from continued skilled acute OT services to address these deficits to maximize his safety and independence. Safety Interventions: patient left in chair, chair alarm in place, call light within reach, gait belt and nurse notified    Plan  Frequency: 3-5 x/per week  Current Treatment Recommendations: functional mobility training, transfer training, endurance training and ADL/self-care training    Goals  Patient Goals: To return home   Short Term Goals:  Time Frame: Discharge  Patient will complete upper body ADL at Independent   Patient will complete lower body ADL at modified independent, . Comment with AE   Patient will complete toileting at modified independent   Patient will complete grooming at modified independent, . Comment in stance at sink   Patient will complete functional transfers at modified independent, . Comment w/RW as needed   Patient will complete functional mobility at modified independent, .   Comment w/RW as needed      All goals ongoing    Therapy Session Time     Individual Group Co-treatment   Time In    1128   Time Out    1157   Minutes    29        Timed Code Treatment Minutes:    29 min  Total Treatment Minutes:  29 min       Electronically Signed By: DESMOND Hugo MOT OTR/L [id number]

## 2022-06-14 NOTE — PROGRESS NOTES
Messaged Hospitalist about pts increasing BP overnight, but it seemed to be high b/c he was so agitated, anxious and confused all night. 1mg PO ativan ordered and given.

## 2022-06-15 VITALS
BODY MASS INDEX: 35.59 KG/M2 | RESPIRATION RATE: 16 BRPM | HEART RATE: 74 BPM | SYSTOLIC BLOOD PRESSURE: 167 MMHG | WEIGHT: 248.6 LBS | OXYGEN SATURATION: 93 % | HEIGHT: 70 IN | DIASTOLIC BLOOD PRESSURE: 69 MMHG | TEMPERATURE: 98.2 F

## 2022-06-15 LAB
ANION GAP SERPL CALCULATED.3IONS-SCNC: 9 MMOL/L (ref 3–16)
BASOPHILS ABSOLUTE: 0 K/UL (ref 0–0.2)
BASOPHILS RELATIVE PERCENT: 0.2 %
BUN BLDV-MCNC: 20 MG/DL (ref 7–20)
CALCIUM SERPL-MCNC: 9 MG/DL (ref 8.3–10.6)
CHLORIDE BLD-SCNC: 102 MMOL/L (ref 99–110)
CO2: 31 MMOL/L (ref 21–32)
CREAT SERPL-MCNC: 0.8 MG/DL (ref 0.8–1.3)
EOSINOPHILS ABSOLUTE: 0 K/UL (ref 0–0.6)
EOSINOPHILS RELATIVE PERCENT: 0.3 %
GFR AFRICAN AMERICAN: >60
GFR NON-AFRICAN AMERICAN: >60
GLUCOSE BLD-MCNC: 103 MG/DL (ref 70–99)
GLUCOSE BLD-MCNC: 116 MG/DL (ref 70–99)
GLUCOSE BLD-MCNC: 130 MG/DL (ref 70–99)
GLUCOSE BLD-MCNC: 239 MG/DL (ref 70–99)
HCT VFR BLD CALC: 40.3 % (ref 40.5–52.5)
HEMOGLOBIN: 13.2 G/DL (ref 13.5–17.5)
LYMPHOCYTES ABSOLUTE: 1.1 K/UL (ref 1–5.1)
LYMPHOCYTES RELATIVE PERCENT: 20 %
MCH RBC QN AUTO: 29.4 PG (ref 26–34)
MCHC RBC AUTO-ENTMCNC: 32.9 G/DL (ref 31–36)
MCV RBC AUTO: 89.4 FL (ref 80–100)
MONOCYTES ABSOLUTE: 0.6 K/UL (ref 0–1.3)
MONOCYTES RELATIVE PERCENT: 10.6 %
NEUTROPHILS ABSOLUTE: 3.9 K/UL (ref 1.7–7.7)
NEUTROPHILS RELATIVE PERCENT: 68.9 %
PDW BLD-RTO: 16.9 % (ref 12.4–15.4)
PERFORMED ON: ABNORMAL
PLATELET # BLD: 184 K/UL (ref 135–450)
PMV BLD AUTO: 7.9 FL (ref 5–10.5)
POTASSIUM REFLEX MAGNESIUM: 3.6 MMOL/L (ref 3.5–5.1)
RBC # BLD: 4.51 M/UL (ref 4.2–5.9)
SODIUM BLD-SCNC: 142 MMOL/L (ref 136–145)
WBC # BLD: 5.7 K/UL (ref 4–11)

## 2022-06-15 PROCEDURE — 97116 GAIT TRAINING THERAPY: CPT

## 2022-06-15 PROCEDURE — 80048 BASIC METABOLIC PNL TOTAL CA: CPT

## 2022-06-15 PROCEDURE — 6360000002 HC RX W HCPCS: Performed by: INTERNAL MEDICINE

## 2022-06-15 PROCEDURE — 94761 N-INVAS EAR/PLS OXIMETRY MLT: CPT

## 2022-06-15 PROCEDURE — 94640 AIRWAY INHALATION TREATMENT: CPT

## 2022-06-15 PROCEDURE — 6370000000 HC RX 637 (ALT 250 FOR IP): Performed by: INTERNAL MEDICINE

## 2022-06-15 PROCEDURE — 97535 SELF CARE MNGMENT TRAINING: CPT

## 2022-06-15 PROCEDURE — 36415 COLL VENOUS BLD VENIPUNCTURE: CPT

## 2022-06-15 PROCEDURE — 6370000000 HC RX 637 (ALT 250 FOR IP): Performed by: NURSE PRACTITIONER

## 2022-06-15 PROCEDURE — 6360000002 HC RX W HCPCS: Performed by: NURSE PRACTITIONER

## 2022-06-15 PROCEDURE — 85025 COMPLETE CBC W/AUTO DIFF WBC: CPT

## 2022-06-15 PROCEDURE — 2700000000 HC OXYGEN THERAPY PER DAY

## 2022-06-15 PROCEDURE — 2580000003 HC RX 258: Performed by: INTERNAL MEDICINE

## 2022-06-15 PROCEDURE — 97110 THERAPEUTIC EXERCISES: CPT

## 2022-06-15 RX ORDER — LOSARTAN POTASSIUM 100 MG/1
100 TABLET ORAL DAILY
Qty: 30 TABLET | Refills: 3 | Status: ON HOLD | OUTPATIENT
Start: 2022-06-16 | End: 2022-07-29

## 2022-06-15 RX ORDER — LOSARTAN POTASSIUM 100 MG/1
100 TABLET ORAL DAILY
Status: DISCONTINUED | OUTPATIENT
Start: 2022-06-15 | End: 2022-06-15 | Stop reason: HOSPADM

## 2022-06-15 RX ORDER — IPRATROPIUM BROMIDE AND ALBUTEROL SULFATE 2.5; .5 MG/3ML; MG/3ML
1 SOLUTION RESPIRATORY (INHALATION) 2 TIMES DAILY
Status: DISCONTINUED | OUTPATIENT
Start: 2022-06-15 | End: 2022-06-15 | Stop reason: HOSPADM

## 2022-06-15 RX ORDER — HYDRALAZINE HYDROCHLORIDE 20 MG/ML
5 INJECTION INTRAMUSCULAR; INTRAVENOUS EVERY 6 HOURS PRN
Status: DISCONTINUED | OUTPATIENT
Start: 2022-06-15 | End: 2022-06-15 | Stop reason: HOSPADM

## 2022-06-15 RX ORDER — FLUTICASONE PROPIONATE AND SALMETEROL 250; 50 UG/1; UG/1
1 POWDER RESPIRATORY (INHALATION) EVERY 12 HOURS
Qty: 60 EACH | Refills: 3 | Status: SHIPPED | OUTPATIENT
Start: 2022-06-15

## 2022-06-15 RX ORDER — PREDNISONE 20 MG/1
40 TABLET ORAL DAILY
Qty: 6 TABLET | Refills: 0 | Status: SHIPPED | OUTPATIENT
Start: 2022-06-15 | End: 2022-06-18

## 2022-06-15 RX ADMIN — GABAPENTIN 100 MG: 100 CAPSULE ORAL at 09:05

## 2022-06-15 RX ADMIN — LOSARTAN POTASSIUM 100 MG: 100 TABLET, FILM COATED ORAL at 09:04

## 2022-06-15 RX ADMIN — METHYLPREDNISOLONE SODIUM SUCCINATE 40 MG: 40 INJECTION, POWDER, FOR SOLUTION INTRAMUSCULAR; INTRAVENOUS at 09:05

## 2022-06-15 RX ADMIN — THIAMINE HCL TAB 100 MG 100 MG: 100 TAB at 09:06

## 2022-06-15 RX ADMIN — IPRATROPIUM BROMIDE AND ALBUTEROL SULFATE 1 AMPULE: 2.5; .5 SOLUTION RESPIRATORY (INHALATION) at 09:28

## 2022-06-15 RX ADMIN — Medication 250 MCG: at 09:05

## 2022-06-15 RX ADMIN — AMLODIPINE BESYLATE 10 MG: 5 TABLET ORAL at 09:04

## 2022-06-15 RX ADMIN — SODIUM CHLORIDE, PRESERVATIVE FREE 10 ML: 5 INJECTION INTRAVENOUS at 09:05

## 2022-06-15 RX ADMIN — FINASTERIDE 5 MG: 5 TABLET, FILM COATED ORAL at 09:06

## 2022-06-15 RX ADMIN — ENOXAPARIN SODIUM 40 MG: 100 INJECTION SUBCUTANEOUS at 09:05

## 2022-06-15 RX ADMIN — PANTOPRAZOLE SODIUM 40 MG: 40 TABLET, DELAYED RELEASE ORAL at 05:56

## 2022-06-15 RX ADMIN — GABAPENTIN 100 MG: 100 CAPSULE ORAL at 12:52

## 2022-06-15 RX ADMIN — HYDRALAZINE HYDROCHLORIDE 5 MG: 20 INJECTION INTRAMUSCULAR; INTRAVENOUS at 02:13

## 2022-06-15 RX ADMIN — INSULIN LISPRO 2 UNITS: 100 INJECTION, SOLUTION INTRAVENOUS; SUBCUTANEOUS at 12:52

## 2022-06-15 NOTE — PROGRESS NOTES
[facility]   Phone: [phone]    Occupational Therapy    [] Initial Evaluation            [x] Daily Treatment Note         [] Discharge Summary      Patient: Bhavesh Harrington   : 1925   MRN: [id number]   Date of Service:  6/15/2022    Admitting Diagnosis:  Acute exacerbation of chronic obstructive pulmonary disease (COPD) ([facility] [address]  Current Admission Summary: Bhavesh Harrington is a 80 y.o. male who presents for evaluation of shakiness. Patient states that he started feeling shaky a few days ago. States he notices it more when he is trying to walk but is still ambulatory. No dizziness/lightheadedness, focal weakness, visual disturbances, facial asymmetry, speech difficulty or syncope. He denies any chest pain or shortness of breath, despite triage note. He is on nasal cannula oxygen as needed at home at baseline. He denies cough congestion runny nose. No fevers or chills. No abdominal pain nausea vomiting or diarrhea. No history of similar symptoms. States that his children just wanted him to get checked out. He has no other complaints or concerns at this time. Past Medical History:  has a past medical history of BPH, Elevated prostate specific antigen (PSA), Esophageal reflux, Fracture closed, humerus, HTN (hypertension), Hyperlipidemia, Osteoarthritis, Peripheral neuropathy, Peripheral neuropathy, and Type II or unspecified type diabetes mellitus without mention of complication, not stated as uncontrolled. Past Surgical History:  has a past surgical history that includes knee surgery (); Ankle surgery (); Tonsillectomy (); shoulder surgery (02/09/10); Colonoscopy (10-02); Cataract removal (10-03); Total knee arthroplasty (07); Revision total knee arthroplasty (09); Revision total knee arthroplasty (09); and Total knee arthroplasty ().     Discharge Recommendations: Bhavesh Harrington scored a 19/24 on the AM-PAC ADL Inpatient form. Current research shows that an AM-PAC score of 18 or greater is typically associated with a discharge to the patient's home setting. Based on the patient's AM-PAC score, and their current ADL deficits, it is recommended that the patient have 2-3 sessions per week of Occupational Therapy at d/c to increase the patient's independence. At this time, this patient demonstrates the endurance and safety to discharge home with home services and a follow up treatment frequency of 2-3x/wk. Please see assessment section for further patient specific details. HOME HEALTH CARE: LEVEL 3 SAFETY     - Initial home health evaluation to occur within 24-48 hours, in patient home   - Therapy evaluations in home within 24-48 hours of discharge; including DME and home safety   - Frontload therapy 5 days, then 3x a week   - Therapy to evaluate if patient has [address] needs for personal care   -  evaluation within 24-48 hours, includes evaluation of resources and insurance to determine [place], [place], LTC, and Medicaid options     If patient discharges prior to next session this note will serve as a discharge summary. Please see below for the latest assessment towards goals. DME Required For Discharge: no DME required at discharge - pt's son reports pt has shower chair but refuses to use it     Precautions/Restrictions: high fall risk  Positional Restrictions:no positional restrictions    Pre-Admission Information   Lives With: spouse    Type of Home: house  Home Layout: one level  Home Access: level entry  [facility]: walker accessible, walk in shower  Toilet Height: standard height  Bathroom Equipment: grab bars in shower, grab bars around toilet, shower chair, hand held shower head  Home Equipment: rolling walker, reacher, oxygen, alert button, .   Comment: Uses RW sometimes if needed; on night O2,possibly 3.5 L  Transfer Assistance: Independent without use of device  Ambulation Assistance:modified independent with use of RW  ADL Assistance: independent with all ADL's  IADL Assistance: requires assistance with all homemaking tasks, [employer] comes 3 hrs per day for cooking,cleaning, laundry;dtr grocery shops  Active :        [x] Yes  [] No  Hand Dominance: [] Left  [] Right  Current Employment: retired. 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 Subjective: Pt (3L O2) supine in bed upon entry, easily awaken with vcs/tactile cues, pt very pleasant. When pt asked this writer where she was born the pt began reciting \"[employer] [place]\" by Enrique Jensen without error. Pt agreeable to therapy session. Pt O2 91-94% throughout therapy session. General: Pt supine to sit SBA, Pt sit EOB Mod I and ate breakfast. Pt sit to stand SBA and pt ambulated to bathroom toilet ~18ft with rw at SBA. Pt toilet transfer and toilet SBA. Pt doffed brief and donned new brief seated on toilet (pulled up in stance SBA). Pt also donned t-shirt with setup seated on toilet. Pt in stance at sink SBA ~4 min with oral care/wash hands. Pt ambulated to recliner SBA with rw ~8ft and stand to sit SBA. Call light in reach and chair alarm on. Pain: 0/10  Pain Interventions: not applicable        Activities of Daily Living  Basic Activities of Daily Living  Feeding: Independent  Grooming: stand by assistance  Upper Extremity Dressing: setup assistance. Equipment: none  Lower Extremity Dressing: stand by assistance. Equipment: none  Toileting: stand by assistance. Equipment: none  Instrumental Activities of Daily Living  No IADL completed on this date. Functional Mobility  Bed Mobility  Supine to Sit: stand by assistance    Transfers  Sit to stand transfer:stand by assistance  Stand to sit transfer: stand by assistance  Toilet transfer: stand by assistance. Mobility technique: ambulating.   Equipment utilized: standard bedside commode    Functional Mobility:  Sitting Balance: modified independent. Standing Balance: stand by assistance. Functional Mobility: rolling walker. stand by assistance. Activity: to/from bathroom       Functional Outcomes  AM-PAC Inpatient Daily Activity Raw Score: 19    Cognition  WFL  Orientation:    oriented to person, oriented to place, oriented to time and oriented to situation  Command Following:   [facility]     Education  Barriers To Learning: none  Patient Education: patient educated on OT role and benefits, plan of care, transfer training, discharge recommendations  Learning Assessment:  patient verbalizes and demonstrates understanding    Assessment  Activity Tolerance: Tolerated well, does require rest breaks   Impairments Requiring Therapeutic Intervention: decreased functional mobility, decreased ADL status, decreased endurance  Prognosis: good  Clinical Assessment: Pt  SBA for all transfers and functional mobility and completing brief donning/toileting at SBA. Recommend d/c home with initial 24 hr assist and HHOT. Pt's son says pt and pt's wife planning on moving to [address] [place] [address] from [place] as soon as Aug. Continue with POC. Safety Interventions: patient left in chair, chair alarm in place, call light within reach, gait belt and nurse notified    Plan  Frequency: 3-5 x/per week  Current Treatment Recommendations: functional mobility training, transfer training, endurance training and ADL/self-care training    Goals  Patient Goals:  To return home   Short Term Goals:  Time Frame: Discharge  Patient will complete upper body ADL at Independent - setup UB dressing continue goal 6/15  Patient will complete lower body ADL at modified independent with AE - SBA continue goal 6/15  Patient will complete toileting at modified independent - SBA goal not met 6/15  Patient will complete grooming at modified independent in stance at sink - SBA goal not met 6/15  Patient will complete functional transfers at modified independent w/RW as needed - SBA 6/15 goal not mett  Patient will complete functional mobility at modified independent w/RW as needed  - SBA goal not met 615    All goals ongoing    Therapy Session Time     Individual Group Co-treatment   Time In 1026     Time Out 1104     Minutes 38          Timed Code Treatment Minutes:  38 min  Total Treatment Minutes:  38 min       Electronically Signed By: LÁZARO Delacruz/L [id number]    After reviewing treatment documentation, I am in agreement with this session.     Darden Kawasaki, MOT OTR/L [id number]

## 2022-06-15 NOTE — DISCHARGE INSTR - COC
E11.9    Esophageal reflux K21.9    HTN (hypertension) I10    Lateral epicondylitis M77.10    Olecranon bursitis M70.20    Multifocal pneumonia J18.9    Type 2 diabetes mellitus (HCC) E11.9    Acute respiratory failure with hypoxia and hypercapnia (HCC) J96.01, J96.02    Debility R53.81    Urinary retention R33.9    Acute exacerbation of chronic obstructive pulmonary disease (COPD) ([facility] [address] J44.1    New onset seizure ([facility] [address] R56.9       Isolation/Infection:   Isolation            No Isolation          Patient Infection Status       Infection Onset Added Last Indicated Last Indicated By Review Planned Expiration Resolved Resolved By    None active    Resolved    COVID-19 (Rule Out) 04/22/22 04/22/22 04/22/22 Respiratory Panel, Molecular, with COVID-19 (Restricted: peds pts or suitable admitted adults) (Ordered)   04/22/22 Rule-Out Test Resulted    COVID-19 (Rule Out) 04/20/22 04/20/22 04/21/22 COVID-19 & Influenza Combo (Ordered)   04/21/22 Rule-Out Test Resulted            Nurse Assessment:  Last Vital Signs: BP (!) 169/72 Comment: RN Notified  Pulse 72   Temp 97.7 °F (36.5 °C) (Oral)   Resp 16   Ht 5' 10\" (1.778 m)   Wt 248 lb 9.6 oz (112.8 kg)   SpO2 93%   BMI 35.67 kg/m²     Last documented pain score (0-10 scale):    Last Weight:   Wt Readings from Last 1 Encounters:   06/14/22 248 lb 9.6 oz (112.8 kg)     Mental Status:  disoriented and alert    IV Access:  - None    Nursing Mobility/ADLs:  Walking   Assisted  Transfer  Assisted  Bathing  Assisted  Dressing  Assisted  Toileting  Assisted  Feeding  Assisted  Med Admin  Assisted  Med Delivery   whole    Wound Care Documentation and Therapy:        Elimination:  Continence:    Bowel: Yes  Bladder: Yes  Urinary Catheter: None   Colostomy/Ileostomy/Ileal Conduit: No       Date of Last BM: 6/15/22    Intake/Output Summary (Last 24 hours) at 6/15/2022 1203  Last data filed at 6/14/2022 1706  Gross per 24 hour   Intake 520 ml   Output --   Net 520 ml     I/O last 3 completed shifts: In: 26 [P.O.:520]  Out: -     Safety Concerns: At Risk for Falls and Aspiration Risk    Impairments/Disabilities:      Hearing    Nutrition Therapy:  Current Nutrition Therapy:   - Oral Diet:  Carb Control 5 carbs/meal (2000kcals/day)    Routes of Feeding: Oral  Liquids: Thin Liquids  Daily Fluid Restriction: no  Last Modified Barium Swallow with Video (Video Swallowing Test): done on 06/14/2022    Treatments at the Time of Hospital Discharge:   Respiratory Treatments: None  Oxygen Therapy:  is on oxygen at 3 L/min per nasal cannula. Ventilator:    - No ventilator support    Rehab Therapies: Physical Therapy and Occupational Therapy  Weight Bearing Status/Restrictions: No weight bearing restrictions  Other Medical Equipment (for information only, NOT a DME order):  walker  Other Treatments: None    Patient's personal belongings (please select all that are sent with patient):  None    RN SIGNATURE:  Electronically signed by Belinda Mcduffie RN on 6/15/22 at 3:40 PM EDT    CASE MANAGEMENT/SOCIAL WORK SECTION    Inpatient Status Date: 6/12/2022    Readmission Risk Assessment Score:  Readmission Risk              Risk of Unplanned Readmission:  17           Discharging to Facility/ [address]  [place], [address]  Phone: [phone]  Fax: [phone]      / signature: Electronically signed by MEEK Jean Baptiste on 6/15/2022 at 2:01 PM      PHYSICIAN SECTION    Prognosis: Fair    Condition at Discharge: Stable    Rehab Potential (if transferring to Rehab): Fair    Recommended Labs or Other Treatments After Discharge:     Physician Certification: I certify the above information and transfer of Alice Chowdhury  is necessary for the continuing treatment of the diagnosis listed and that he requires [address] for greater 30 days.      Update Admission H&P: No change in H&P    PHYSICIAN SIGNATURE:  Electronically signed by Maryam Sorenson MD on 6/15/22 at 12:03 PM EDT

## 2022-06-15 NOTE — PROGRESS NOTES
06/15/22 0931   RT Protocol   History Pulmonary Disease 0   Respiratory pattern 0   Breath sounds 2   Cough 0   Indications for Bronchodilator Therapy Decreased or absent breath sounds   Bronchodilator Assessment Score 2

## 2022-06-15 NOTE — PLAN OF CARE
Problem: Discharge Planning  Goal: Discharge to home or other facility with appropriate resources  6/15/2022 1017 by Liam Fernández RN  Outcome: Progressing     Problem: Safety - Adult  Goal: Free from fall injury  6/15/2022 1017 by Liam Fernández RN  Outcome: Progressing     Problem: ABCDS Injury Assessment  Goal: Absence of physical injury  Outcome: Progressing     Problem: Neurosensory - Adult  Goal: Achieves stable or improved neurological status  Outcome: Progressing     Problem: Respiratory - Adult  Goal: Achieves optimal ventilation and oxygenation  Outcome: Progressing     Problem: Cardiovascular - Adult  Goal: Maintains optimal cardiac output and hemodynamic stability  Outcome: Progressing

## 2022-06-15 NOTE — PROGRESS NOTES
Data- discharge order received, pt verbalized agreement to discharge, disposition to previous residence, no needs for HHC/DME. Action- discharge instructions prepared and given to patient and son, pt verbalized understanding. Medication information packet given r/t NEW and/or CHANGED prescriptions emphasizing name/purpose/side effects, pt verbalized understanding. Discharge instruction summary: Diet- carb control, Activity- as tolerated, Primary Care Physician as follows: Dorette Opitz, [phone] f/u appointment within 1 week,  prescription medications filled at personal pharmacy. 1. WEIGHT: Admit Weight: 245 lb (111.1 kg) (06/13/22 0545)        Today  Weight: 248 lb 9.6 oz (112.8 kg) (06/14/22 0836)       2. O2 SAT.: SpO2: 93 % (06/15/22 1445)    Response- Pt belongings gathered, IV removed. Disposition is home with [facility] care,  transported with personal beloongings, taken to lobby via w/c, no complications.

## 2022-06-15 NOTE — RT PROTOCOL NOTE
RT Inhaler-Nebulizer Bronchodilator Protocol Note    There is a bronchodilator order in the chart from a provider indicating to follow the RT Bronchodilator Protocol and there is an Initiate RT Inhaler-Nebulizer Bronchodilator Protocol order as well (see protocol at bottom of note). CXR Findings:  No results found. The findings from the last RT Protocol Assessment were as follows:   History Pulmonary Disease: None or smoker <15 pack years  Respiratory Pattern: Regular pattern and RR 12-20 bpm  Breath Sounds: Slightly diminished and/or crackles  Cough: Strong, spontaneous, non-productive  Indication for Bronchodilator Therapy: Decreased or absent breath sounds  Bronchodilator Assessment Score: 2    Aerosolized bronchodilator medication orders have been revised according to the RT Inhaler-Nebulizer Bronchodilator Protocol below. Respiratory Therapist to perform RT Therapy Protocol Assessment initially then follow the protocol. Repeat RT Therapy Protocol Assessment PRN for score 0-3 or on second treatment, BID, and PRN for scores above 3. No Indications - adjust the frequency to every 6 hours PRN wheezing or bronchospasm, if no treatments needed after 48 hours then discontinue using Per Protocol order mode. If indication present, adjust the RT bronchodilator orders based on the Bronchodilator Assessment Score as indicated below. Use Inhaler orders unless patient has one or more of the following: on home nebulizer, not able to hold breath for 10 seconds, is not alert and oriented, cannot activate and use MDI correctly, or respiratory rate 25 breaths per minute or more, then use the equivalent nebulizer order(s) with same Frequency and PRN reasons based on the score. If a patient is on this medication at home then do not decrease Frequency below that used at home.     0-3 - enter or revise RT bronchodilator order(s) to equivalent RT Bronchodilator order with Frequency of every 4 hours PRN for wheezing or increased work of breathing using Per Protocol order mode. 4-6 - enter or revise RT Bronchodilator order(s) to two equivalent RT bronchodilator orders with one order with BID Frequency and one order with Frequency of every 4 hours PRN wheezing or increased work of breathing using Per Protocol order mode. 7-10 - enter or revise RT Bronchodilator order(s) to two equivalent RT bronchodilator orders with one order with TID Frequency and one order with Frequency of every 4 hours PRN wheezing or increased work of breathing using Per Protocol order mode. 11-13 - enter or revise RT Bronchodilator order(s) to one equivalent RT bronchodilator order with QID Frequency and an Albuterol order with Frequency of every 4 hours PRN wheezing or increased work of breathing using Per Protocol order mode. Greater than 13 - enter or revise RT Bronchodilator order(s) to one equivalent RT bronchodilator order with every 4 hours Frequency and an Albuterol order with Frequency of every 2 hours PRN wheezing or increased work of breathing using Per Protocol order mode. RT to enter RT Home Evaluation for COPD & MDI Assessment order using Per Protocol order mode.     Electronically signed by Mary Jane Lambert RCP on 6/15/2022 at 9:32 AM

## 2022-06-15 NOTE — DISCHARGE SUMMARY
Hospital Medicine Discharge Summary    Patient: Emmie Oreilly     Gender: male  : 1925   Age: 80 y.o. MRN: [id number]    Admitting Physician: Kamila Carvalho MD  Discharge Physician: Kamila Carvalho MD     Code Status: Full Code     Admit Date: 2022   Discharge Date:   6/15/2022    Disposition:  Home  Time spent arranging discharge: 35 minutes    Discharge Diagnoses: Active Hospital Problems    Diagnosis Date Noted    New onset seizure [facility]) [R56.9]      Priority: Medium    [facility] [J44.1] 2022     Priority: Medium       Condition at Discharge:  Stable    Hospital Course:   Admitted to hospital with acute COPD exacerbation with chronic hypoxic hypercapnic respiratory failure. Patient was started on IV steroids underwent modified barium swallow which showed no aspiration risk. Per patient PCP patient was tried on BiPAP patient tolerated BiPAP PCP will arrange outpatient sleep study. Patient did have question of seizure and tremors which resolved with correction of COPD exacerbation. No AEDs or further work-up per neurology    Discharge Exam:    BP (!) 220/92 Comment: RN notified  Pulse 61   Temp 97.7 °F (36.5 °C) (Oral)   Resp 16   Ht 5' 10\" (1.778 m)   Wt 248 lb 9.6 oz (112.8 kg)   SpO2 95%   BMI 35.67 kg/m²   General appearance:  Appears comfortable. AAOx3  HEENT: atraumatic, Pupils equal, muscous membranes moist, no masses appreciated  Cardiovascular: Regular rate and rhythm no murmurs appreciated  Respiratory: CTAB no wheezing  Gastrointestinal: Abdomen soft, non-tender, BS+  EXT: no edema  Neurology: no gross focal deficts  Psychiatry: Appropriate affect.  Not agitated  Skin: Warm, dry, no rashes appreciated    Discharge Medications:   Current Discharge Medication List      START taking these medications    Details   predniSONE (DELTASONE) 20 MG tablet Take 2 tablets by mouth daily for 3 days  Qty: 6 tablet, Refills: 0      fluticasone-salmeterol (ADVAIR DISKUS) 250-50 MCG/ACT AEPB diskus inhaler Inhale 1 puff into the lungs every 12 hours  Qty: 60 each, Refills: 3           Current Discharge Medication List        Current Discharge Medication List      CONTINUE these medications which have NOT CHANGED    Details   amitriptyline (ELAVIL) 10 mg tablet Take 10 mg by mouth nightly      amLODIPine (NORVASC) 10 MG tablet Take 1 tablet by mouth daily  Qty: 30 tablet, Refills: 3      losartan (COZAAR) 25 MG tablet Take 1 tablet by mouth daily  Qty: 90 tablet, Refills: 1      albuterol (PROVENTIL) (2.5 MG/3ML) 0.083% nebulizer solution Take 3 mLs by nebulization every 4 hours as needed for Wheezing  Qty: 120 each, Refills: 3      Cyanocobalamin (B-12) 250 MCG TABS Take 250 mcg by mouth daily  Qty: 30 tablet, Refills: 0      finasteride (PROSCAR) 5 MG tablet Take 1 tablet by mouth daily  Qty: 30 tablet, Refills: 3      gabapentin (NEURONTIN) 100 MG capsule Take 100 mg by mouth 4 times daily. pantoprazole (PROTONIX) 20 MG tablet Take 20 mg by mouth daily      vitamin B-1 (THIAMINE) 100 MG tablet Take 100 mg by mouth daily           Current Discharge Medication List      STOP taking these medications       doxazosin (CARDURA) 4 MG tablet Comments:   Reason for Stopping:               Labs:  For convenience and continuity at follow-up the following most recent labs are provided:    Lab Results   Component Value Date    WBC 5.7 06/15/2022    HGB 13.2 06/15/2022    HCT 40.3 06/15/2022    MCV 89.4 06/15/2022     06/15/2022     06/15/2022    K 3.6 06/15/2022     06/15/2022    CO2 31 06/15/2022    BUN 20 06/15/2022    CREATININE 0.8 06/15/2022    CALCIUM 9.0 06/15/2022    ALKPHOS 77 06/12/2022    ALT 13 06/12/2022    AST 22 06/12/2022    BILITOT 0.5 06/12/2022    LABALBU 4.0 06/12/2022    LDLCALC 64 11/24/2010    TRIG 78 11/24/2010     No results found for: INR    Radiology:  XR CHEST (2 VW)    Result product in conjunction with speech pathology services. FLUOROSCOPY DOSE AND TYPE OR TIME AND EXPOSURES: 10 images at 48 seconds COMPARISON: 04/25/2022 HISTORY: ORDERING SYSTEM PROVIDED HISTORY: dysphagia TECHNOLOGIST PROVIDED HISTORY: Reason for exam:->dysphagia Reason for Exam: dysphagia FINDINGS: Oral phase of swallowing was grossly within normal limits. There is intermittent flash laryngeal penetration with the thin barium. No evidence jennie aspiration. Intermittent flash laryngeal penetration with the thin barium. Please see separate speech pathology report for full discussion of findings and recommendations.  RECOMMENDATIONS: Unavailable         Signed:    Shubham Morocho MD   6/15/2022

## 2022-06-15 NOTE — PROGRESS NOTES
Physical Therapy    [facility] [address] Department   Phone: [phone]    Physical Therapy    [] Initial Evaluation            [x] Daily Treatment Note         [] Discharge Summary      Patient: Bhavesh Harrington   : 1925   MRN: [id number]   Date of Service:  6/15/2022  Admitting Diagnosis: Acute exacerbation of chronic obstructive pulmonary disease (COPD) ([facility]  Current Admission Summary: Rose Disla a 80 y. o. male who presents for evaluation of shakiness.  Patient states that he started feeling shaky a few days ago. Luyemi Peterson he notices it more when he is trying to walk but is still ambulatory.  No dizziness/lightheadedness, focal weakness, visual disturbances, facial asymmetry, speech difficulty or syncope.  He denies any chest pain or shortness of breath, despite triage note. Joceline Dickson is on nasal cannula oxygen as needed at home at baseline.  He denies cough congestion runny nose.  No fevers or chills.  No abdominal pain nausea vomiting or diarrhea.  No history of similar symptoms.  States that his children just wanted him to get checked out. Joceline Dickson has no other complaints or concerns at this time  Past Medical History:  has a past medical history of BPH, Elevated prostate specific antigen (PSA), Esophageal reflux, Fracture closed, humerus, HTN (hypertension), Hyperlipidemia, Osteoarthritis, Peripheral neuropathy, Peripheral neuropathy, and Type II or unspecified type diabetes mellitus without mention of complication, not stated as uncontrolled. Past Surgical History:  has a past surgical history that includes knee surgery (); Ankle surgery (); Tonsillectomy (); shoulder surgery (02/09/10); Colonoscopy (10-02); Cataract removal (10-03); Total knee arthroplasty (07); Revision total knee arthroplasty (09); Revision total knee arthroplasty (09); and Total knee arthroplasty ().       Discharge Recommendations: Bhavesh Harrington scored a 18/24 on the AM-PAC short mobility form. Current research shows that an AM-PAC score of 18 or greater is typically associated with a discharge to the patient's home setting. Based on the patient's AM-PAC score and their current functional mobility deficits, it is recommended that the patient have 2-3 sessions per week of Physical Therapy at d/c to increase the patient's independence.  At this time, this patient demonstrates the endurance and safety to discharge home with HHPT and a follow up treatment frequency of 2-3x/wk. Please see assessment section for further patient specific details.     If patient discharges prior to next session this note will serve as a discharge summary.   Please see below for the latest assessment towards goals.      HOME HEALTH CARE: LEVEL 3 SAFETY     - Initial home health evaluation to occur within 24-48 hours, in patient home   - Therapy evaluations in home within 24-48 hours of discharge; including DME and home safety   - Frontload therapy 5 days, then 3x a week   - Therapy to evaluate if patient has [address] needs for personal care   -  evaluation within 24-48 hours, includes evaluation of resources and insurance to determine [place], [place], LTC, and Medicaid options      DME Required For Discharge: rolling walker  Precautions/Restrictions: high fall risk  Positional Restrictions:no positional restrictions     Pre-Admission Information   Lives With: spouse                  Type of Home: house  Home Layout: one level  Home Access: level entry  Bathroom Layout: walker accessible, walk in shower  Toilet Height: standard height  Bathroom Equipment: grab bars in shower, grab bars around toilet, shower chair, hand held shower head  Home Equipment: rolling walker, reacher, oxygen, alert button, .  Comment: Uses RW sometimes if needed; on night O2,possibly 3.5 L  Transfer Assistance: Independent without use of device  Ambulation Assistance:modified independent with use of RW  ADL Assistance: independent with all ADL's  IADL Assistance: requires assistance with all homemaking tasks, Home [employer] comes 3 hrs per day for cooking,cleaning, laundry;dtr grocery shops  Active :        [x]? ? Yes                 []? ? No  Hand Dominance: []?? Left                 []? ? Right  Current Employment: retired.  Occupation: Finance for [employer]  Hobbies:  Used to drive for the [employer]  Recent Falls: 1 fall past 6 mos.           Subjective  General: Pt denies pain; seated in recliner upon arrival; agreeable to PT/OT. Pt on 3.5L O2 upon arrival, nasal cannula not properly donned therefore resting at Spo2 88%, once correct Spo2 94%   Pain: 0/10  Pain Interventions: not applicable     Functional Mobility  Bed Mobility  Bed mobility not completed on this date. Comments:  Transfers  Sit to stand transfer: stand by assistance  Stand to sit transfer: stand by assistance  Comments: with RW from chair, minor cues for safe hand placement   Ambulation  Assistive Device: RW  Assistance: SBA  Distance: 60+30+30   Gait Mechanics: Forward flexed posture. Encouraged to use RW at this time due to decreased endurance and SOB. Comments:  Pt tolerance to ambulation limited by severe coughing episodes.  (see activity tolerance for Spo2 levels)      Balance  Static Sitting Balance: good: independent with functional balance in unsupported position  Dynamic Sitting Balance: good: requires SBA to maintain balance  Static Standing Balance: fair (-): maintains balance at CGA with use of UE support  Dynamic Standing Balance: fair (-): maintains balance at CGA with use of UE support  Comments: Pt stood at RW to perform standing exercise SBA      Other Therapeutic Interventions: Pt completed x10 STS from recliner and x15 standing marches with UE support on RW     Functional Outcomes                  Cognition  WFL  Orientation:    oriented to person, oriented to place, disoriented to situation and oriented to time (Knows year and month)  Command Following:   [facility]  Education  Barriers To Learning: none  Patient Education: patient educated on goals, PT role and benefits, plan of care, general safety, functional mobility training, transfer training, discharge recommendations. Pt encouraged to use RW at this time to increase steadiness and reduce risk for falls  Learning Assessment:  patient verbalizes and demonstrates understanding     Assessment  Activity Tolerance: Pt on 3.5L O2 with Spo2 breifly dropping to 88% with activity and quickly recovering to 94% upon seated rest and PLB. Pt experiences significant coughing episodes throughout session. Impairments Requiring Therapeutic Intervention: decreased functional mobility, decreased strength, decreased safety awareness, decreased endurance, decreased balance  Prognosis: good  Clinical Assessment: Pt continues to present with decreased endurance and balance requiring RW for increased stability and safety. Pt completes functional transfers with SBA and ambulates with RW SBA. Pt lives in one level home with his wife and has a home health aide visit daily for 2hrs. Pt continues to require supplemental O2 for activity and experiences several severe coughing fits during session. Pt would continue to benefit from skilled PT in order to improve endurance and safely return to PLOF.     .      Safety Interventions: patient left in chair, chair alarm in place, call light within reach, gait belt, patient at risk for falls and nurse notified     Plan  Frequency: 3-5 x/per week  Current Treatment Recommendations: strengthening, balance training, functional mobility training, transfer training, gait training, endurance training and patient/caregiver education     Goals  Patient Goals: to return home   Short Term Goals:  Time Frame: Discharge   Patient will complete bed mobility at modified independent   Patient will complete transfers at modified independent   Patient will ambulate 100 ft with use of LRAD at [facility]   **none met this date  Therapy Session Time      Individual Group Co-treatment   Time In 1317       Time Out 1345       Minutes 28         Total Treatment Minutes:  28       Electronically Signed By: Nam Contreras Do [facility], [id number] [id number]

## 2022-06-15 NOTE — CARE COORDINATION
Discharge Planning:     (CM) spoke with admissions staff, Ivis, at [facility] who reported that they use [facility] for [employer] [facility]). CM spoke with the patient who is agreeable to a referral to [facility] for [address] and reported that he is familiar with [facility] and agreed to [facility] referring him for [address] services to them. Patient reported that he uses oxygen through the night, but not the day. CM confirmed with patient that he has a rolling walker at home, as therapy recommended one. CM presented patient with IMM (Important Message from Medicare) letter prior to discharge (w/in 48 hrs of dc). CM explained the right to appeal discharge and the process to follow. All questions answered. Copy of IMM provided to patient and patient signed CM copy. Date and time of signed IMM documented and placed on paper chart. CM spoke with patient's Son, Wesley Sarabia (on emergency contact list), who reported that the patient uses oxygen through the day and night. Wesley Sarabia reported that he is going to get patient's portable oxygen concentrator and then he will pick the patient up today. Wesley Sarabia to transport patient back to his home at [address] [place] [address] Road. CM called [facility] and left a voicemail for Intake staff, [employer] [facility] ([phone]), providing patient's referral for [address] services. CM encouraged callback with any questions and provided contact information. All CM discharge needs met at this time. Medical staff to inform CM team if additional discharge needs arise. FREDA Sherman, [facility]  Social Work -   [phone]    Electronically signed by MEEK Aguillon on 6/15/2022 at 2:29 PM        Update at :  CM received a call from [facility] at [facility] who confirmed that they can service the patient and that she has access to Epic to pull the patient's discharge paperwork.       FREDA Sherman [address]  [place] [address]  Social Work - Case Manager  [phone]    Electronically signed by MEEK Dee on 6/15/2022 at 2:36 PM

## 2022-07-29 ENCOUNTER — APPOINTMENT (OUTPATIENT)
Dept: GENERAL RADIOLOGY | Age: 87
DRG: 291 | End: 2022-07-29
Payer: MEDICARE

## 2022-07-29 ENCOUNTER — HOSPITAL ENCOUNTER (INPATIENT)
Age: 87
LOS: 4 days | Discharge: HOME HEALTH | DRG: 291 | End: 2022-08-02
Attending: EMERGENCY MEDICINE | Admitting: INTERNAL MEDICINE
Payer: MEDICARE

## 2022-07-29 DIAGNOSIS — I50.31 ACUTE DIASTOLIC CHF (CONGESTIVE HEART FAILURE) (HCC): Primary | ICD-10-CM

## 2022-07-29 PROBLEM — J18.9 MULTIFOCAL PNEUMONIA: Status: RESOLVED | Noted: 2022-04-21 | Resolved: 2022-07-29

## 2022-07-29 PROBLEM — I50.41 ACUTE COMBINED SYSTOLIC AND DIASTOLIC CHF, NYHA CLASS 1 (HCC): Status: ACTIVE | Noted: 2022-07-29

## 2022-07-29 LAB
ANION GAP SERPL CALCULATED.3IONS-SCNC: 11 MMOL/L (ref 3–16)
BASE EXCESS ARTERIAL: 4.4 MMOL/L (ref -3–3)
BASE EXCESS VENOUS: 4.7 MMOL/L (ref -3–3)
BASOPHILS ABSOLUTE: 0 K/UL (ref 0–0.2)
BASOPHILS RELATIVE PERCENT: 0.1 %
BUN BLDV-MCNC: 17 MG/DL (ref 7–20)
CALCIUM SERPL-MCNC: 9.3 MG/DL (ref 8.3–10.6)
CARBOXYHEMOGLOBIN ARTERIAL: 1.2 % (ref 0–1.5)
CARBOXYHEMOGLOBIN: 2.8 % (ref 0–1.5)
CHLORIDE BLD-SCNC: 103 MMOL/L (ref 99–110)
CO2: 30 MMOL/L (ref 21–32)
CREAT SERPL-MCNC: 1.1 MG/DL (ref 0.8–1.3)
EKG ATRIAL RATE: 214 BPM
EKG DIAGNOSIS: NORMAL
EKG Q-T INTERVAL: 310 MS
EKG QRS DURATION: 102 MS
EKG QTC CALCULATION (BAZETT): 436 MS
EKG R AXIS: -46 DEGREES
EKG T AXIS: 70 DEGREES
EKG VENTRICULAR RATE: 119 BPM
EOSINOPHILS ABSOLUTE: 0 K/UL (ref 0–0.6)
EOSINOPHILS RELATIVE PERCENT: 0.3 %
GFR AFRICAN AMERICAN: >60
GFR NON-AFRICAN AMERICAN: >60
GLUCOSE BLD-MCNC: 158 MG/DL (ref 70–99)
HCO3 ARTERIAL: 33 MMOL/L (ref 21–29)
HCO3 VENOUS: 31.5 MMOL/L (ref 23–29)
HCT VFR BLD CALC: 43.1 % (ref 40.5–52.5)
HEMOGLOBIN, ART, EXTENDED: 14.1 G/DL (ref 13.5–17.5)
HEMOGLOBIN: 13.9 G/DL (ref 13.5–17.5)
INR BLD: 1.02 (ref 0.87–1.14)
LACTIC ACID, SEPSIS: 2.4 MMOL/L (ref 0.4–1.9)
LYMPHOCYTES ABSOLUTE: 1.2 K/UL (ref 1–5.1)
LYMPHOCYTES RELATIVE PERCENT: 10.6 %
MCH RBC QN AUTO: 29.5 PG (ref 26–34)
MCHC RBC AUTO-ENTMCNC: 32.2 G/DL (ref 31–36)
MCV RBC AUTO: 91.9 FL (ref 80–100)
METHEMOGLOBIN ARTERIAL: 0 %
METHEMOGLOBIN VENOUS: 0.3 %
MONOCYTES ABSOLUTE: 0.2 K/UL (ref 0–1.3)
MONOCYTES RELATIVE PERCENT: 2.1 %
NEUTROPHILS ABSOLUTE: 10 K/UL (ref 1.7–7.7)
NEUTROPHILS RELATIVE PERCENT: 86.9 %
O2 CONTENT, VEN: 19 VOL %
O2 SAT, ARTERIAL: 96.2 %
O2 SAT, VEN: 98 %
O2 THERAPY: ABNORMAL
O2 THERAPY: ABNORMAL
PCO2 ARTERIAL: 66.2 MMHG (ref 35–45)
PCO2, VEN: 54.4 MMHG (ref 40–50)
PDW BLD-RTO: 16.1 % (ref 12.4–15.4)
PH ARTERIAL: 7.31 (ref 7.35–7.45)
PH VENOUS: 7.37 (ref 7.35–7.45)
PLATELET # BLD: 190 K/UL (ref 135–450)
PMV BLD AUTO: 7.9 FL (ref 5–10.5)
PO2 ARTERIAL: 84.5 MMHG (ref 75–108)
PO2, VEN: 97.7 MMHG (ref 25–40)
POTASSIUM SERPL-SCNC: 4.6 MMOL/L (ref 3.5–5.1)
PRO-BNP: 1176 PG/ML (ref 0–449)
PRO-BNP: 494 PG/ML (ref 0–449)
PROTHROMBIN TIME: 13.3 SEC (ref 11.7–14.5)
RBC # BLD: 4.69 M/UL (ref 4.2–5.9)
REASON FOR REJECTION: NORMAL
REJECTED TEST: NORMAL
SODIUM BLD-SCNC: 144 MMOL/L (ref 136–145)
TCO2 ARTERIAL: 78.4 MMOL/L
TCO2 CALC VENOUS: 74 MMOL/L
TROPONIN: 0.01 NG/ML
WBC # BLD: 11.5 K/UL (ref 4–11)

## 2022-07-29 PROCEDURE — 6360000002 HC RX W HCPCS: Performed by: EMERGENCY MEDICINE

## 2022-07-29 PROCEDURE — 2700000000 HC OXYGEN THERAPY PER DAY

## 2022-07-29 PROCEDURE — 1200000000 HC SEMI PRIVATE

## 2022-07-29 PROCEDURE — 80048 BASIC METABOLIC PNL TOTAL CA: CPT

## 2022-07-29 PROCEDURE — 83605 ASSAY OF LACTIC ACID: CPT

## 2022-07-29 PROCEDURE — 93010 ELECTROCARDIOGRAM REPORT: CPT | Performed by: INTERNAL MEDICINE

## 2022-07-29 PROCEDURE — 94761 N-INVAS EAR/PLS OXIMETRY MLT: CPT

## 2022-07-29 PROCEDURE — 36600 WITHDRAWAL OF ARTERIAL BLOOD: CPT

## 2022-07-29 PROCEDURE — 96375 TX/PRO/DX INJ NEW DRUG ADDON: CPT

## 2022-07-29 PROCEDURE — 71045 X-RAY EXAM CHEST 1 VIEW: CPT

## 2022-07-29 PROCEDURE — 96374 THER/PROPH/DIAG INJ IV PUSH: CPT

## 2022-07-29 PROCEDURE — 85610 PROTHROMBIN TIME: CPT

## 2022-07-29 PROCEDURE — 99285 EMERGENCY DEPT VISIT HI MDM: CPT

## 2022-07-29 PROCEDURE — 82803 BLOOD GASES ANY COMBINATION: CPT

## 2022-07-29 PROCEDURE — 93005 ELECTROCARDIOGRAM TRACING: CPT | Performed by: EMERGENCY MEDICINE

## 2022-07-29 PROCEDURE — 6360000002 HC RX W HCPCS: Performed by: INTERNAL MEDICINE

## 2022-07-29 PROCEDURE — 6370000000 HC RX 637 (ALT 250 FOR IP): Performed by: INTERNAL MEDICINE

## 2022-07-29 PROCEDURE — 6370000000 HC RX 637 (ALT 250 FOR IP): Performed by: EMERGENCY MEDICINE

## 2022-07-29 PROCEDURE — 36415 COLL VENOUS BLD VENIPUNCTURE: CPT

## 2022-07-29 PROCEDURE — 94640 AIRWAY INHALATION TREATMENT: CPT

## 2022-07-29 PROCEDURE — 87040 BLOOD CULTURE FOR BACTERIA: CPT

## 2022-07-29 PROCEDURE — 83880 ASSAY OF NATRIURETIC PEPTIDE: CPT

## 2022-07-29 PROCEDURE — 94660 CPAP INITIATION&MGMT: CPT

## 2022-07-29 PROCEDURE — 84484 ASSAY OF TROPONIN QUANT: CPT

## 2022-07-29 PROCEDURE — 85025 COMPLETE CBC W/AUTO DIFF WBC: CPT

## 2022-07-29 RX ORDER — AMITRIPTYLINE HYDROCHLORIDE 10 MG/1
10 TABLET, FILM COATED ORAL NIGHTLY
Status: DISCONTINUED | OUTPATIENT
Start: 2022-07-29 | End: 2022-08-02 | Stop reason: HOSPADM

## 2022-07-29 RX ORDER — GABAPENTIN 100 MG/1
100 CAPSULE ORAL 4 TIMES DAILY
Status: DISCONTINUED | OUTPATIENT
Start: 2022-07-29 | End: 2022-07-30

## 2022-07-29 RX ORDER — FINASTERIDE 5 MG/1
5 TABLET, FILM COATED ORAL DAILY
Status: DISCONTINUED | OUTPATIENT
Start: 2022-07-30 | End: 2022-08-02 | Stop reason: HOSPADM

## 2022-07-29 RX ORDER — UBIDECARENONE 75 MG
250 CAPSULE ORAL DAILY
Status: DISCONTINUED | OUTPATIENT
Start: 2022-07-29 | End: 2022-08-02 | Stop reason: HOSPADM

## 2022-07-29 RX ORDER — LOSARTAN POTASSIUM 100 MG/1
100 TABLET ORAL DAILY
Status: DISCONTINUED | OUTPATIENT
Start: 2022-07-29 | End: 2022-07-30

## 2022-07-29 RX ORDER — PANTOPRAZOLE SODIUM 40 MG/1
40 TABLET, DELAYED RELEASE ORAL DAILY
Status: DISCONTINUED | OUTPATIENT
Start: 2022-07-30 | End: 2022-08-02 | Stop reason: HOSPADM

## 2022-07-29 RX ORDER — CEFUROXIME AXETIL 250 MG/1
500 TABLET ORAL EVERY 12 HOURS SCHEDULED
Status: DISCONTINUED | OUTPATIENT
Start: 2022-07-29 | End: 2022-08-02 | Stop reason: HOSPADM

## 2022-07-29 RX ORDER — LIDOCAINE HYDROCHLORIDE 20 MG/ML
JELLY TOPICAL
Status: DISPENSED
Start: 2022-07-29 | End: 2022-07-30

## 2022-07-29 RX ORDER — POTASSIUM CHLORIDE 750 MG/1
10 TABLET, FILM COATED, EXTENDED RELEASE ORAL
Status: DISCONTINUED | OUTPATIENT
Start: 2022-07-30 | End: 2022-07-30

## 2022-07-29 RX ORDER — CHLOROTHIAZIDE SODIUM 500 MG/1
500 INJECTION INTRAVENOUS ONCE
Status: COMPLETED | OUTPATIENT
Start: 2022-07-29 | End: 2022-07-29

## 2022-07-29 RX ORDER — GAUZE BANDAGE 2" X 2"
100 BANDAGE TOPICAL DAILY
Status: DISCONTINUED | OUTPATIENT
Start: 2022-07-29 | End: 2022-08-02 | Stop reason: HOSPADM

## 2022-07-29 RX ORDER — ALBUTEROL SULFATE 2.5 MG/3ML
2.5 SOLUTION RESPIRATORY (INHALATION) EVERY 4 HOURS PRN
Status: DISCONTINUED | OUTPATIENT
Start: 2022-07-29 | End: 2022-08-02 | Stop reason: HOSPADM

## 2022-07-29 RX ORDER — NITROGLYCERIN 0.4 MG/1
0.4 TABLET SUBLINGUAL ONCE
Status: COMPLETED | OUTPATIENT
Start: 2022-07-29 | End: 2022-07-29

## 2022-07-29 RX ORDER — DOXAZOSIN MESYLATE 1 MG/1
4 TABLET ORAL NIGHTLY
Status: ON HOLD | COMMUNITY
End: 2022-08-02 | Stop reason: HOSPADM

## 2022-07-29 RX ORDER — ENOXAPARIN SODIUM 100 MG/ML
30 INJECTION SUBCUTANEOUS 2 TIMES DAILY
Status: DISCONTINUED | OUTPATIENT
Start: 2022-07-29 | End: 2022-08-02 | Stop reason: HOSPADM

## 2022-07-29 RX ORDER — AMLODIPINE BESYLATE 5 MG/1
10 TABLET ORAL DAILY
Status: DISCONTINUED | OUTPATIENT
Start: 2022-07-30 | End: 2022-08-02 | Stop reason: HOSPADM

## 2022-07-29 RX ORDER — FUROSEMIDE 10 MG/ML
20 INJECTION INTRAMUSCULAR; INTRAVENOUS DAILY
Status: DISCONTINUED | OUTPATIENT
Start: 2022-07-29 | End: 2022-08-02 | Stop reason: HOSPADM

## 2022-07-29 RX ORDER — FUROSEMIDE 10 MG/ML
100 INJECTION INTRAMUSCULAR; INTRAVENOUS ONCE
Status: COMPLETED | OUTPATIENT
Start: 2022-07-29 | End: 2022-07-29

## 2022-07-29 RX ORDER — MIDAZOLAM HYDROCHLORIDE 2 MG/2ML
1 INJECTION, SOLUTION INTRAMUSCULAR; INTRAVENOUS ONCE
Status: COMPLETED | OUTPATIENT
Start: 2022-07-29 | End: 2022-07-29

## 2022-07-29 RX ADMIN — CHLOROTHIAZIDE SODIUM 500 MG: 500 INJECTION, POWDER, LYOPHILIZED, FOR SOLUTION INTRAVENOUS at 13:02

## 2022-07-29 RX ADMIN — LOSARTAN POTASSIUM 100 MG: 100 TABLET, FILM COATED ORAL at 17:32

## 2022-07-29 RX ADMIN — Medication 2 PUFF: at 20:22

## 2022-07-29 RX ADMIN — FUROSEMIDE 100 MG: 100 INJECTION, SOLUTION INTRAMUSCULAR; INTRAVENOUS at 12:07

## 2022-07-29 RX ADMIN — MIDAZOLAM HYDROCHLORIDE 1 MG: 1 INJECTION, SOLUTION INTRAMUSCULAR; INTRAVENOUS at 12:06

## 2022-07-29 RX ADMIN — GABAPENTIN 100 MG: 100 CAPSULE ORAL at 19:54

## 2022-07-29 RX ADMIN — CEFUROXIME AXETIL 500 MG: 250 TABLET ORAL at 23:55

## 2022-07-29 RX ADMIN — GABAPENTIN 100 MG: 100 CAPSULE ORAL at 17:32

## 2022-07-29 RX ADMIN — FUROSEMIDE 20 MG: 10 INJECTION, SOLUTION INTRAMUSCULAR; INTRAVENOUS at 17:33

## 2022-07-29 RX ADMIN — NITROGLYCERIN 0.4 MG: 0.4 TABLET, ORALLY DISINTEGRATING SUBLINGUAL at 12:07

## 2022-07-29 RX ADMIN — ENOXAPARIN SODIUM 30 MG: 100 INJECTION SUBCUTANEOUS at 19:54

## 2022-07-29 RX ADMIN — AMITRIPTYLINE HYDROCHLORIDE 10 MG: 10 TABLET, FILM COATED ORAL at 19:55

## 2022-07-29 ASSESSMENT — PAIN SCALES - GENERAL
PAINLEVEL_OUTOF10: 1
PAINLEVEL_OUTOF10: 0
PAINLEVEL_OUTOF10: 0

## 2022-07-29 NOTE — ED PROVIDER NOTES
Emergency Department Encounter    Patient: Zakia Andrews  MRN: [id number]  : 1925  Date of Evaluation: 2022  ED Provider:  Obey Durbin MD    Triage Chief Complaint:   Respiratory Distress (Pt in from [facility] via [facility], [facility] reporting pt woke up shaking with chills, 99.5 temp, rapid COVID negative, pink frothy emesis, sbp 200's, pitting edema bilat, pt in via [facility] on a CPAP)    Pauma:  Zakia Andrews is a 80 y.o. male that presents to the ER for evaluation of acute severe dyspnea,. Increasing and pink frothy sputum positive increase in work of breathing. Unclear if prior history of congestive heart failure. Denies chest pain denies fevers. No hemoptysis.   Moderate to severe increase in work of breathing on arrival.    ROS - see HPI, below listed is current ROS at time of my eval:  General:  No fevers, no chills  Eyes:  No recent vison changes, no discharge  ENT:  No sore throat, no nasal congestion, no hearing changes  Cardiovascular:  No chest pain, no palpitations  Respiratory:  + shortness of breath,+ cough, no wheezing  Gastrointestinal:  No pain, no nausea, no vomiting, no diarrhea  Musculoskeletal:  No muscle pain, no joint pain  Skin:  No rash, no pruritis, no easy bruising  Neurologic:  No speech problems, no headache  Psychiatric:  No anxiety  Genitourinary:  No dysuria, no hematuria  Endocrine:  No unexpected weight gain, no unexpected weight loss  Extremities:  + edema, no pain    Past Medical History:   Diagnosis Date    BPH     Elevated prostate specific antigen (PSA)     Esophageal reflux     Fracture closed, humerus 09    Shoulder    HTN (hypertension)     Hyperlipidemia     Osteoarthritis     Peripheral neuropathy     Peripheral neuropathy     Type II or unspecified type diabetes mellitus without mention of complication, not stated as uncontrolled      Past Surgical History:   Procedure Laterality Date    ANKLE SURGERY      CATARACT REMOVAL 10-03    Right    COLONOSCOPY  10-02    Normal    KNEE SURGERY  1998    Arthroscopic Right    REVISION TOTAL KNEE ARTHROPLASTY  1-14-09    Infected knee prosthesis removed, space placed(Right knee)    REVISION TOTAL KNEE ARTHROPLASTY  2-23-09    Right knee spacer removed and new joint inserted    SHOULDER SURGERY  02/09/10    Total Shoulder arthroplasty    TONSILLECTOMY  1927    TOTAL KNEE ARTHROPLASTY  4-20-07    Right    TOTAL KNEE ARTHROPLASTY  5-06    Left     Family History   Problem Relation Age of Onset    Elevated Lipids Mother     Cancer Father         lung cancer    Emphysema Father      Social History     Socioeconomic History    Marital status:      Spouse name: Not on file    Number of children: Not on file    Years of education: Not on file    Highest education level: Not on file   Occupational History    Occupation: Retired   Tobacco Use    Smoking status: Never    Smokeless tobacco: Never   Substance and Sexual Activity    Alcohol use: Yes    Drug use: No    Sexual activity: Not on file   Other Topics Concern    Not on file   Social History Narrative    Not on file     Social Determinants of Health     Financial Resource Strain: Not on file   Food Insecurity: Not on file   Transportation Needs: Not on file   Physical Activity: Not on file   Stress: Not on file   Social Connections: Not on file   Intimate Partner Violence: Not on file   Housing Stability: Not on file     Current Facility-Administered Medications   Medication Dose Route Frequency Provider Last Rate Last Admin    lidocaine (XYLOCAINE) 2 % uro-jet              Current Outpatient Medications   Medication Sig Dispense Refill    fluticasone-salmeterol (ADVAIR DISKUS) 250-50 MCG/ACT AEPB diskus inhaler Inhale 1 puff into the lungs every 12 hours 60 each 3    losartan (COZAAR) 100 MG tablet Take 1 tablet by mouth daily 30 tablet 3    amitriptyline (ELAVIL) 10 mg tablet Take 10 mg by mouth nightly      amLODIPine (NORVASC) 10 MG tablet Take 1 tablet by mouth daily 30 tablet 3    losartan (COZAAR) 25 MG tablet Take 1 tablet by mouth daily 90 tablet 1    albuterol (PROVENTIL) (2.5 MG/3ML) 0.083% nebulizer solution Take 3 mLs by nebulization every 4 hours as needed for Wheezing 120 each 3    Cyanocobalamin (B-12) 250 MCG TABS Take 250 mcg by mouth daily 30 tablet 0    finasteride (PROSCAR) 5 MG tablet Take 1 tablet by mouth daily 30 tablet 3    gabapentin (NEURONTIN) 100 MG capsule Take 100 mg by mouth 4 times daily. pantoprazole (PROTONIX) 20 MG tablet Take 20 mg by mouth daily      vitamin B-1 (THIAMINE) 100 MG tablet Take 100 mg by mouth daily       Allergies   Allergen Reactions    Morphine Other (See Comments)     Confusion & agitation       Nursing Notes Reviewed    Physical Exam:  Triage VS:    ED Triage Vitals   Enc Vitals Group      BP 07/29/22 1138 (!) 151/69      Heart Rate 07/29/22 1138 (!) 120      Resp 07/29/22 1138 29      Temp 07/29/22 1300 98.8 °F (37.1 °C)      Temp src --       SpO2 07/29/22 1138 94 %      Weight 07/29/22 1138 248 lb 9.6 oz (112.8 kg)      Height 07/29/22 1138 5' 10\" (1.778 m)      Head Circumference --       Peak Flow --       Pain Score --       Pain Loc --       Pain Edu? --       Excl. in [address]? --         My pulse ox interpretation is - normal    General appearance:  No acute distress. Skin:  Warm. Dry. No rash. Neck:  Trachea midline, no JVD  Heart:  Regular rate and rhythm, normal S1 & S2.    Perfusion:  Intact  Respiratory: Positive tachypnea bilateral crackles positive increase in work of breathing. Abdominal:  soft, nontender, bowel sounds intact, nondistended   Back:  No CVA tenderness to palpation  Extremity:    1+ extremity pitting edema bilateral lower extremities   Neurological:  Alert and oriented X 3.        I have reviewed and interpreted all of the currently available lab results from this visit (if applicable):  Results for orders placed or performed during the hospital encounter of 07/29/22   CBC with Auto Differential   Result Value Ref Range    WBC 11.5 (H) 4.0 - 11.0 K/uL    RBC 4.69 4.20 - 5.90 M/uL    Hemoglobin 13.9 13.5 - 17.5 g/dL    Hematocrit 43.1 40.5 - 52.5 %    MCV 91.9 80.0 - 100.0 fL    MCH 29.5 26.0 - 34.0 pg    MCHC 32.2 31.0 - 36.0 g/dL    RDW 16.1 (H) 12.4 - 15.4 %    Platelets 437 145 - 719 K/uL    MPV 7.9 5.0 - 10.5 fL    Neutrophils % 86.9 %    Lymphocytes % 10.6 %    Monocytes % 2.1 %    Eosinophils % 0.3 %    Basophils % 0.1 %    Neutrophils Absolute 10.0 (H) 1.7 - 7.7 K/uL    Lymphocytes Absolute 1.2 1.0 - 5.1 K/uL    Monocytes Absolute 0.2 0.0 - 1.3 K/uL    Eosinophils Absolute 0.0 0.0 - 0.6 K/uL    Basophils Absolute 0.0 0.0 - 0.2 K/uL   Protime-INR   Result Value Ref Range    Protime 13.3 11.7 - 14.5 sec    INR 1.02 0.87 - 1.14   SPECIMEN REJECTION   Result Value Ref Range    Rejected Test bmp     Reason for Rejection see below    EKG 12 Lead   Result Value Ref Range    Ventricular Rate 119 BPM    Atrial Rate 214 BPM    QRS Duration 102 ms    Q-T Interval 310 ms    QTc Calculation (Bazett) 436 ms    R Axis -46 degrees    T Axis 70 degrees    Diagnosis       Undetermined rhythmLeft anterior fascicular blockAbnormal ECGConfirmed by Marshall Bishop ([id number]) on 7/29/2022 1:09:24 PM      Radiographs (if obtained):  Radiologist's Report Reviewed:  XR CHEST PORTABLE    Result Date: 7/29/2022  EXAMINATION: ONE XRAY VIEW OF THE CHEST 7/29/2022 11:47 am COMPARISON: 06/12/2022 HISTORY: ORDERING SYSTEM PROVIDED HISTORY: sob TECHNOLOGIST PROVIDED HISTORY: Reason for exam:->sob Reason for Exam: Respiratory Distress (Pt in from [facility] via [facility], [facility] reporting pt woke up shaking with chills, 99.5 temp, rapid COVID negative, pink frothy emesis, sbp 200's, pitting edema bilat, pt in via [facility] on a CPAP) FINDINGS: Cardiac silhouette is at the upper limits of normal.  Thoracic aorta is tortuous. Pulmonary vessels are indistinct.   There are perihilar, patchy opacities as well, greater on the right. No pleural effusion or pneumothorax is evident. Vascular congestion. Perihilar airspace disease, pulmonary edema or atelectasis, favored over pneumonia. EKG (if obtained): (All EKG's are interpreted by myself in the absence of a cardiologist)    MDM:  Patient presents to the ER for evaluation of acute decompensated congestive heart failure. #1: Acute decompensated congestive heart failure. Patient was initially placed on BiPAP and was able to be titrated off of this he received preload and afterload reduction diuresis with loop and thiazide diuretics. Cardiovascular rule out. Upon my evaluation, this patient had a high probability of imminent or life-threatening deterioration due to ACUTE CHF which required my direct attention, intervention, and personal management. The total critical care time personally spent while evaluating and treating this patient was 35 minutes exclusive of any time spent doing separately billable procedures. This includes time at the bedside, data interpretation, medication management, monitoring for potential decompensation and physician consultation. Specifics of interventions taken and potentially life-threatening diagnostic considerations are listed above in the medical decision making. Clinical Impression:  1. Acute diastolic CHF (congestive heart failure) ([facility] [address]      Disposition referral (if applicable):  No follow-up provider specified. Disposition medications (if applicable):  New Prescriptions    No medications on file     ED Provider Disposition Time  DISPOSITION Decision To Discharge 07/29/2022 01:31:47 PM      Comment: Please note this report has been produced using speech recognition software and may contain errors related to that system including errors in grammar, punctuation, and spelling, as well as words and phrases that may be inappropriate. Efforts were made to edit the dictations.       Sai Hess MD  07/29/22 0035

## 2022-07-29 NOTE — RT PROTOCOL NOTE
bronchodilator order(s) to equivalent RT Bronchodilator order with Frequency of every 4 hours PRN for wheezing or increased work of breathing using Per Protocol order mode. 4-6 - enter or revise RT Bronchodilator order(s) to two equivalent RT bronchodilator orders with one order with BID Frequency and one order with Frequency of every 4 hours PRN wheezing or increased work of breathing using Per Protocol order mode. 7-10 - enter or revise RT Bronchodilator order(s) to two equivalent RT bronchodilator orders with one order with TID Frequency and one order with Frequency of every 4 hours PRN wheezing or increased work of breathing using Per Protocol order mode. 11-13 - enter or revise RT Bronchodilator order(s) to one equivalent RT bronchodilator order with QID Frequency and an Albuterol order with Frequency of every 4 hours PRN wheezing or increased work of breathing using Per Protocol order mode. Greater than 13 - enter or revise RT Bronchodilator order(s) to one equivalent RT bronchodilator order with every 4 hours Frequency and an Albuterol order with Frequency of every 2 hours PRN wheezing or increased work of breathing using Per Protocol order mode. RT to enter RT Home Evaluation for COPD & MDI Assessment order using Per Protocol order mode.     Electronically signed by Kirsty Swartz RCP on 7/29/2022 at 5:04 PM

## 2022-07-29 NOTE — FLOWSHEET NOTE
07/29/22 1546   Vital Signs   Temp 98 °F (36.7 °C)   Temp Source Oral   Heart Rate 85   Heart Rate Source Monitor   Resp 18   BP (!) 144/75   BP Location Right upper arm   MAP (Calculated) 98   Level of Consciousness Alert (0)   MEWS Score 1   Pain Assessment   Pain Assessment None - Denies Pain   Pain Level 0   Oxygen Therapy   SpO2 90 %   O2 Device Nasal cannula   O2 Flow Rate (L/min) 6 L/min   Pt admitted to room 3313. Oriented to room and call light system. Pt oriented/disoriented at time. Son at bedside. Pt lethargic but awakens to voice. Spoke with pts daughter [facility]) and she stated pts code status is DO NOT INTUBATE. She will bring paperwork in when she comes to visit. Gave pt water with a straw and he started coughing. Pts son states he has had multiple hospitalizations with aspiration PNA. Speech therapy consulted. Pt did tolerated meds with pudding.

## 2022-07-29 NOTE — PROGRESS NOTES
Pharmacist Review and Automatic Dose Adjustment of Prophylactic Enoxaparin    *Review reason for admission/hospital problem list*    The reviewing pharmacist has made an adjustment to the ordered enoxaparin dose or converted to UFH per the approved [facility] protocol and table as identified below. Sylvia Piña is a 80 y.o. male. Recent Labs     07/29/22  1410   CREATININE 1.1       Estimated Creatinine Clearance: 49 mL/min (based on SCr of 1.1 mg/dL). Recent Labs     07/29/22  1139   HGB 13.9   HCT 43.1        Recent Labs     07/29/22  1139   INR 1.02       Height:   Ht Readings from Last 1 Encounters:   07/29/22 5' 10\" (1.778 m)     Weight:  Wt Readings from Last 1 Encounters:   07/29/22 248 lb 9.6 oz (112.8 kg)               Plan: Based upon the patient's weight and renal function, the ordered enoxaparin dose of 30 mg daily has been changed/converted to 30 mg BID.       Thank you,  Juan Jose Moody, [facility]  7/29/2022, 4:05 PM

## 2022-07-30 PROBLEM — E87.5 HYPERKALEMIA: Status: ACTIVE | Noted: 2022-07-30

## 2022-07-30 LAB
ANION GAP SERPL CALCULATED.3IONS-SCNC: 7 MMOL/L (ref 3–16)
BUN BLDV-MCNC: 26 MG/DL (ref 7–20)
CALCIUM SERPL-MCNC: 9.2 MG/DL (ref 8.3–10.6)
CHLORIDE BLD-SCNC: 99 MMOL/L (ref 99–110)
CO2: 33 MMOL/L (ref 21–32)
CREAT SERPL-MCNC: 1.4 MG/DL (ref 0.8–1.3)
GFR AFRICAN AMERICAN: 57
GFR NON-AFRICAN AMERICAN: 47
GLUCOSE BLD-MCNC: 190 MG/DL (ref 70–99)
POTASSIUM SERPL-SCNC: 5.1 MMOL/L (ref 3.5–5.1)
SODIUM BLD-SCNC: 139 MMOL/L (ref 136–145)

## 2022-07-30 PROCEDURE — 92526 ORAL FUNCTION THERAPY: CPT

## 2022-07-30 PROCEDURE — 80048 BASIC METABOLIC PNL TOTAL CA: CPT

## 2022-07-30 PROCEDURE — 6360000002 HC RX W HCPCS: Performed by: INTERNAL MEDICINE

## 2022-07-30 PROCEDURE — 94640 AIRWAY INHALATION TREATMENT: CPT

## 2022-07-30 PROCEDURE — 1200000000 HC SEMI PRIVATE

## 2022-07-30 PROCEDURE — 36415 COLL VENOUS BLD VENIPUNCTURE: CPT

## 2022-07-30 PROCEDURE — 6370000000 HC RX 637 (ALT 250 FOR IP): Performed by: INTERNAL MEDICINE

## 2022-07-30 PROCEDURE — 92610 EVALUATE SWALLOWING FUNCTION: CPT

## 2022-07-30 RX ORDER — GABAPENTIN 300 MG/1
600 CAPSULE ORAL DAILY
Status: DISCONTINUED | OUTPATIENT
Start: 2022-07-31 | End: 2022-08-02 | Stop reason: HOSPADM

## 2022-07-30 RX ORDER — GABAPENTIN 300 MG/1
300 CAPSULE ORAL 3 TIMES DAILY
Status: DISCONTINUED | OUTPATIENT
Start: 2022-07-30 | End: 2022-08-02 | Stop reason: HOSPADM

## 2022-07-30 RX ADMIN — GABAPENTIN 300 MG: 300 CAPSULE ORAL at 20:29

## 2022-07-30 RX ADMIN — FINASTERIDE 5 MG: 5 TABLET, FILM COATED ORAL at 09:24

## 2022-07-30 RX ADMIN — POTASSIUM CHLORIDE 10 MEQ: 750 TABLET, FILM COATED, EXTENDED RELEASE ORAL at 09:23

## 2022-07-30 RX ADMIN — GABAPENTIN 100 MG: 100 CAPSULE ORAL at 13:02

## 2022-07-30 RX ADMIN — Medication 2 PUFF: at 19:42

## 2022-07-30 RX ADMIN — GABAPENTIN 100 MG: 100 CAPSULE ORAL at 09:23

## 2022-07-30 RX ADMIN — Medication 250 MCG: at 09:45

## 2022-07-30 RX ADMIN — CEFUROXIME AXETIL 500 MG: 250 TABLET ORAL at 09:22

## 2022-07-30 RX ADMIN — PANTOPRAZOLE SODIUM 40 MG: 40 TABLET, DELAYED RELEASE ORAL at 09:22

## 2022-07-30 RX ADMIN — AMITRIPTYLINE HYDROCHLORIDE 10 MG: 10 TABLET, FILM COATED ORAL at 20:29

## 2022-07-30 RX ADMIN — Medication 2 PUFF: at 09:03

## 2022-07-30 RX ADMIN — ENOXAPARIN SODIUM 30 MG: 100 INJECTION SUBCUTANEOUS at 07:55

## 2022-07-30 RX ADMIN — CEFUROXIME AXETIL 500 MG: 250 TABLET ORAL at 20:29

## 2022-07-30 RX ADMIN — GABAPENTIN 100 MG: 100 CAPSULE ORAL at 17:05

## 2022-07-30 RX ADMIN — AMLODIPINE BESYLATE 10 MG: 5 TABLET ORAL at 09:22

## 2022-07-30 RX ADMIN — THIAMINE HCL TAB 100 MG 100 MG: 100 TAB at 09:24

## 2022-07-30 RX ADMIN — ENOXAPARIN SODIUM 30 MG: 100 INJECTION SUBCUTANEOUS at 20:29

## 2022-07-30 ASSESSMENT — PAIN DESCRIPTION - PAIN TYPE: TYPE: NEUROPATHIC PAIN

## 2022-07-30 ASSESSMENT — PAIN SCALES - GENERAL: PAINLEVEL_OUTOF10: 9

## 2022-07-30 ASSESSMENT — PAIN DESCRIPTION - DESCRIPTORS: DESCRIPTORS: TINGLING;BURNING

## 2022-07-30 ASSESSMENT — PAIN DESCRIPTION - FREQUENCY: FREQUENCY: INTERMITTENT

## 2022-07-30 ASSESSMENT — PAIN DESCRIPTION - LOCATION: LOCATION: LEG

## 2022-07-30 NOTE — PROGRESS NOTES
Facility/Department: [address] NURSING  Initial Assessment  DYSPHAGIA BEDSIDE SWALLOW EVALUATION     Patient: Yaz Fuller   : 1925   MRN: [id number]      Evaluation Date: 2022   Admitting Diagnosis: Acute combined systolic and diastolic CHF, NYHA class 1 (HCC) [W90.12]  Acute diastolic CHF (congestive heart failure) ([address] [I50.31]  Pain: Denies                                                       H&P: 2022  \"The patient is a 42-year-old white American gentleman came to the emergency room with history of shortness of breath. The patient is a poor historian, did appear to be in respiratory distress at [facility], but came via [address] The patient appeared also somewhat shaky, also had temperature of 99.5. Rapid COVID negative. The patient had a pink frothy emesis. Systolic blood pressure went as high as 200. The patient had pitting edema bilaterally. The patient was put on CPAP. The patient needed  evaluation for acute severe dyspnea. The patient did not have any angina pectoris. No abdominal pain. No hematemesis or melena. The patient had moderate-to-severe increase in work of breathing on arrival  to the emergency room. \"    Imaging:  Chest X-ray: 2022  Impression   Vascular congestion. Perihilar airspace disease, pulmonary edema or atelectasis, favored over   pneumonia. Prior Modified Barium Swallow Study: 2022  \"Modified Barium Swallow evaluation completed on 2022. Patient presents with oropharyngeal dysphagia secondary to prolonged mastication, decreased A-P bolus transport, intermittent pharyngeal pooling, minimally delayed swallow initiation, diminished pharyngeal stripping wave, decreased laryngeal elevation, decreased epiglottic inversion complicated by advanced age, co morbidities resulting in observed intermittent laryngeal penetration of thin liquids and trace/minimal pharyngeal residue.  Laryngeal penetration was noted during the swallow with thin liquids, material entered the airway, remained above the vocal folds and was ejected from the airway. laryngeal penetration with thin liquids appeared to be due to pharyngeal pooling with delayed swallow initiation and decreased laryngeal elevation. No aspiration was viewed. After the swallow intermittent trace to mild pharyngeal stasis was noted. Patient is at an increased risk of aspiration due to intermittent laryngeal penetration and decreased airway closure. The results of this study represent improvement from prior MBS completed 4/25/2022. See below for recommendations. \"    History/Prior Level of Function:   Living Status: [employer]  Prior Dysphagia History: Pt seen by speech therapy at this facility during previous admissions, most recently June 2022. Pt has had three modified barium swallow studies since 2019, most recent study 6/14/2022 recommending regular textured diet with thin liquids (see above). Pt poor historian and reduced recall of dysphagia history. Reason for referral: SLP evaluation orders received due to coughing with intake , prior hx of dysphagia , and new diagnosis of PNA     Dysphagia Impressions/Diagnosis: Oropharyngeal Dysphagia   Pt sleeping upon SLP entry but easily alerted and receptive, verbally responsive. Positioned upright in bed with HOB elevated, on 5L O2 via NC. Oral motor exam revealed functional lingual, labial, and buccal ROM and coordination. Adequate dentition for mastication, gross facial symmetry. Laryngeal function assessment revealed present volitional swallow, strong volitional cough, and clear vocal quality at baseline. Pt assessed with PO presentations, fed with set-up assistance from SLP: ice chips, thin liquids (spoon, cup, straw), puree, soft and bite-sized, and solids. Oral phase characterized by mildly prolonged but functional  oral manipulation, delayed A-P transit of solids with use of liquid wash to adequately clear oral residue.  Pt observed to hospital     Patient Positioning: Upright in bed     Current Diet Level (prior to evaluation): NPO      Respiratory Status:   []Room Air   [x]O2 via nasal cannula   []Other:    Dentition:  [x]Adequate  []Dentures   []Missing Many Teeth  []Edentulous  []Other:    Baseline Vocal Quality:  [x]Normal  []Dysphonic   []Aphonic   []Hoarse  []Wet  []Weak  []Other:    Volitional Cough:  [x]Strong  []Weak  []Wet  []Absent  []Congested  []Other:    Volitional Swallow:   []Absent   []Delayed     [x]Adequate     []Required use of drink     Oral Mechanism Exam:  [x]WFL []Mild   [] Moderate  []Severe  []To be assessed  Impaired:   []Left side      []Right side    []Labial ROM/Coordination    []Labial Symmetry   []Lingual ROM/Coordination   []Lingual Symmetry  []Gag  []Other:     Oral Phase: []WFL [x]Mild   [] Moderate  []Severe  []To be assessed   [x]Impaired/Prolonged Mastication:   []Oral Holding:   []Spillage Left:   []Spillage Right:  []Pocketing Left:   []Pocketing Right:   [x]Decreased Anterior to Posterior Transit:   [x]Suspected Premature Bolus Loss:   []Lingual/Palatal Residue:   []Other:     Pharyngeal Phase: []WFL [x]Mild   [] Moderate  []Severe  []To be assessed   [x]Delayed Swallow:   [x]Suspected Pharyngeal Pooling:   []Decreased Laryngeal Elevation:   []Absent Swallow:  []Wet Vocal Quality:   []Throat Clearing-Immediate:   []Throat Clearing-Delayed:   []Cough-Immediate:   [x]Cough-Delayed: dry, intermittent  []Change in Vital Signs:  [x]Suspected Delayed Pharyngeal Clearing:  []Other:       Eating Assistance:  []Independent  [x]Setup or clean-up assistance   [] Supervision or touching assistance   [] Partial or moderate assistance   [] Substantial or maximal assistance  [] Dependent       EDUCATION:   Provided education regarding role of SLP, results of assessment, recommendations and general speech pathology plan of care.    [x] Pt verbalized understanding and agreement   [x] Pt requires ongoing learning   [] No evidence of comprehension     If patient discharges prior to next visit, this note will serve as discharge.      Timed Code Minutes: 0  Total Treatment Minutes: 23    Electronically signed by:   Micky Manriquez, [facility] Pathologist  [phone]

## 2022-07-30 NOTE — H&P
[place] [address]                     [facility], [address]                              HISTORY AND PHYSICAL    PATIENT NAME: Oscar Betancourt                     :        1925  MED REC NO:   [id number]                          ROOM:       3816  ACCOUNT NO:   [de-identified]                           ADMIT DATE: 2022  PROVIDER:     Ben Marley MD    HISTORY OF PRESENT ILLNESS:  The patient is a 66-year-old white American  gentleman came to the emergency room with history of shortness of  breath. The patient is a poor historian, did appear to be in  respiratory distress at [facility], but [facility] [address] [facility]. The  patient appeared also somewhat shaky, also had temperature of 99.5. Rapid COVID negative. The patient had a pink frothy emesis. Systolic  blood pressure went as high as 200. The patient had pitting edema  bilaterally. The patient was put on CPAP. The patient needed  evaluation for acute severe dyspnea. The patient did not have any  angina pectoris. No abdominal pain. No hematemesis or melena. The  patient had moderate-to-severe increase in work of breathing on arrival  to the emergency room. PAST MEDICAL HISTORY:  Pertinent for aspiration pneumonia, BPH,  dementia, elevated prostate-specific antigen, esophageal reflux, closed  fracture of the humerus, hypertension, hyperlipidemia, osteoarthritis,  peripheral neuropathy, and type 2 diabetes mellitus without mention of  complications. PAST SURGICAL HISTORY:  Pertinent for tonsillectomy, knee surgery, ankle  surgery, colonoscopy, cataract removal, total knee arthroplasty on both  the sides and revision of the total knee arthroplasty on both the sides,  and shoulder surgery.     MEDICATIONS:  The patient is on amitriptyline, amlodipine, Diuril,  Proscar, Lasix, gabapentin, losartan, Dulera, Nitrostat sublingual  p.r.n., pantoprazole, potassium chloride, multivitamin, thiamine, and  vitamin B12. ALLERGIES:  MORPHINE. SOCIAL HISTORY:  He is a  man with two children. He was always a  nonsmoker, always a nondrinker. He used to work as a nurse. There is  no history of illicit substance abuse that he admits to. FAMILY HISTORY:  Unremarkable. REVIEW OF SYSTEMS:  Negative for loss of consciousness. No convulsions. Some visual blurring. The patient is morbidly obese. The patient does  have mild-to-moderate orthopnea. No paroxysmal nocturnal dyspnea. No  abdominal pain. The patient does have lower extremity edema. No  neurologic symptoms. His body mass index is 35.67. PHYSICAL EXAMINATION:  GENERAL:  Alert, awake, oriented x2, moderately distressed 70-year-old  white man looking consistent with his stated age. VITAL SIGNS:  His temperature is 98.1, blood pressure 171/76,  respirations 18, heart rate 88. O2 sat 93% on 6 liters. HEENT:  Oral mucosa dry. SKIN:  Warm and dry. NECK:  Supple. Faint carotid bruit. Mild-to-moderate jugular venous  distention. LUNGS:  Diminished breath sounds. Bilateral crackles. HEART:  Irregular rate and rhythm. S1, S2 without any S3 or S4 gallop. ABDOMEN:  Soft, nontender. Bowel sounds present. EXTREMITIES:  Show bilateral 2+ pitting edema up to the knee. NEUROLOGIC:  The patient appears grossly intact. He is frankly speaking  cannot really participate in active sensorimotor evaluation. Babinski  is absent. LABORATORY EVALUATION:  Shows sodium 144, potassium 4.6, chloride 103,  CO2 30, BUN 17, creatinine 1.1, anion gap is 11. Fasting plasma glucose  158. ProBNP level 494. ABG shows pH of 7.30, pCO2 of 66.2, pO2 84.5,  bicarbonate 33. EKG, normal sinus rhythm. Chest x-ray, vascular  congestion, perihilar airspace disease, pulmonary edema or atelectasis  favored over pneumonia. ASSESSMENT:  Acute-on-chronic combined heart failure, _____ pneumonia,  morbid obesity, and hypertension. PLAN:  Get him admitted. Treat him with IV Lasix. We will also give  the patient empiric antibiotic treatment. The patient is a limited code  only. We will probably give the patient cefuroxime.         Jaime Burns MD    D: 07/29/2022 23:24:30       T: 07/29/2022 23:27:31     [email or web address]  Job#: [id number]     Doc#: [id number]    CC:

## 2022-07-30 NOTE — PROGRESS NOTES
Patient Active Problem List   Diagnosis    Elevated prostate specific antigen (PSA)    Peripheral neuropathy    Type II or unspecified type diabetes mellitus without mention of complication, not stated as uncontrolled    Esophageal reflux    HTN (hypertension)    Lateral epicondylitis    Olecranon bursitis    Pneumonia    Type 2 diabetes mellitus (HCC)    Acute respiratory failure with hypoxia and hypercapnia (HCC)    Debility    Urinary retention    Acute exacerbation of chronic obstructive pulmonary disease (COPD) ([facility]    New onset seizure (HCC)    Acute combined systolic and diastolic CHF, NYHA class 1 ([facility]   H&P dictated

## 2022-07-30 NOTE — PROGRESS NOTES
Pt has c/o nerve pain in foot and said medicine wasn't strong enough (gabapentin 100 mg 4 times a day) Daughter Fernando Byrd called regarding gabapentin prescription: she given him 600 mg in morning and 300 mg three times a day (lunch dinner bedtime). Dr. Argelia Rasheed was called. He asked that this nurse change the order to what the daughter gives him.

## 2022-07-31 ENCOUNTER — APPOINTMENT (OUTPATIENT)
Dept: GENERAL RADIOLOGY | Age: 87
DRG: 291 | End: 2022-07-31
Payer: MEDICARE

## 2022-07-31 LAB
ANION GAP SERPL CALCULATED.3IONS-SCNC: 6 MMOL/L (ref 3–16)
BUN BLDV-MCNC: 36 MG/DL (ref 7–20)
CALCIUM SERPL-MCNC: 9.2 MG/DL (ref 8.3–10.6)
CHLORIDE BLD-SCNC: 100 MMOL/L (ref 99–110)
CO2: 36 MMOL/L (ref 21–32)
CREAT SERPL-MCNC: 1.2 MG/DL (ref 0.8–1.3)
GFR AFRICAN AMERICAN: >60
GFR NON-AFRICAN AMERICAN: 56
GLUCOSE BLD-MCNC: 133 MG/DL (ref 70–99)
POTASSIUM SERPL-SCNC: 4.2 MMOL/L (ref 3.5–5.1)
SODIUM BLD-SCNC: 142 MMOL/L (ref 136–145)

## 2022-07-31 PROCEDURE — 36415 COLL VENOUS BLD VENIPUNCTURE: CPT

## 2022-07-31 PROCEDURE — 80048 BASIC METABOLIC PNL TOTAL CA: CPT

## 2022-07-31 PROCEDURE — 71046 X-RAY EXAM CHEST 2 VIEWS: CPT

## 2022-07-31 PROCEDURE — 6370000000 HC RX 637 (ALT 250 FOR IP): Performed by: INTERNAL MEDICINE

## 2022-07-31 PROCEDURE — 94660 CPAP INITIATION&MGMT: CPT

## 2022-07-31 PROCEDURE — 6360000002 HC RX W HCPCS: Performed by: INTERNAL MEDICINE

## 2022-07-31 PROCEDURE — 94640 AIRWAY INHALATION TREATMENT: CPT

## 2022-07-31 PROCEDURE — 94761 N-INVAS EAR/PLS OXIMETRY MLT: CPT

## 2022-07-31 PROCEDURE — 1200000000 HC SEMI PRIVATE

## 2022-07-31 RX ORDER — LOSARTAN POTASSIUM 25 MG/1
50 TABLET ORAL DAILY
Status: DISCONTINUED | OUTPATIENT
Start: 2022-07-31 | End: 2022-08-02 | Stop reason: HOSPADM

## 2022-07-31 RX ORDER — POLYETHYLENE GLYCOL 3350 17 G/17G
17 POWDER, FOR SOLUTION ORAL DAILY
Status: DISCONTINUED | OUTPATIENT
Start: 2022-07-31 | End: 2022-08-02 | Stop reason: HOSPADM

## 2022-07-31 RX ORDER — SENNA AND DOCUSATE SODIUM 50; 8.6 MG/1; MG/1
2 TABLET, FILM COATED ORAL DAILY
Status: DISCONTINUED | OUTPATIENT
Start: 2022-07-31 | End: 2022-08-02 | Stop reason: HOSPADM

## 2022-07-31 RX ADMIN — Medication 2 PUFF: at 19:52

## 2022-07-31 RX ADMIN — FINASTERIDE 5 MG: 5 TABLET, FILM COATED ORAL at 09:39

## 2022-07-31 RX ADMIN — GABAPENTIN 300 MG: 300 CAPSULE ORAL at 12:36

## 2022-07-31 RX ADMIN — ENOXAPARIN SODIUM 30 MG: 100 INJECTION SUBCUTANEOUS at 09:39

## 2022-07-31 RX ADMIN — Medication 250 MCG: at 09:42

## 2022-07-31 RX ADMIN — AMITRIPTYLINE HYDROCHLORIDE 10 MG: 10 TABLET, FILM COATED ORAL at 20:48

## 2022-07-31 RX ADMIN — ENOXAPARIN SODIUM 30 MG: 100 INJECTION SUBCUTANEOUS at 20:48

## 2022-07-31 RX ADMIN — CEFUROXIME AXETIL 500 MG: 250 TABLET ORAL at 20:48

## 2022-07-31 RX ADMIN — Medication 2 PUFF: at 08:09

## 2022-07-31 RX ADMIN — AMLODIPINE BESYLATE 10 MG: 5 TABLET ORAL at 09:39

## 2022-07-31 RX ADMIN — LOSARTAN POTASSIUM 50 MG: 25 TABLET, FILM COATED ORAL at 18:16

## 2022-07-31 RX ADMIN — PANTOPRAZOLE SODIUM 40 MG: 40 TABLET, DELAYED RELEASE ORAL at 09:39

## 2022-07-31 RX ADMIN — POLYETHYLENE GLYCOL 3350 17 G: 17 POWDER, FOR SOLUTION ORAL at 16:46

## 2022-07-31 RX ADMIN — GABAPENTIN 300 MG: 300 CAPSULE ORAL at 16:46

## 2022-07-31 RX ADMIN — STANDARDIZED SENNA CONCENTRATE AND DOCUSATE SODIUM 2 TABLET: 8.6; 5 TABLET ORAL at 16:46

## 2022-07-31 RX ADMIN — GABAPENTIN 600 MG: 300 CAPSULE ORAL at 09:39

## 2022-07-31 RX ADMIN — CEFUROXIME AXETIL 500 MG: 250 TABLET ORAL at 09:40

## 2022-07-31 RX ADMIN — THIAMINE HCL TAB 100 MG 100 MG: 100 TAB at 09:40

## 2022-07-31 RX ADMIN — GABAPENTIN 300 MG: 300 CAPSULE ORAL at 20:47

## 2022-07-31 ASSESSMENT — PAIN DESCRIPTION - ORIENTATION: ORIENTATION: LEFT;RIGHT

## 2022-07-31 ASSESSMENT — PAIN DESCRIPTION - ONSET: ONSET: SUDDEN

## 2022-07-31 ASSESSMENT — PAIN SCALES - GENERAL
PAINLEVEL_OUTOF10: 8
PAINLEVEL_OUTOF10: 0

## 2022-07-31 ASSESSMENT — PAIN DESCRIPTION - LOCATION: LOCATION: FOOT

## 2022-07-31 ASSESSMENT — PAIN DESCRIPTION - PAIN TYPE: TYPE: NEUROPATHIC PAIN

## 2022-07-31 ASSESSMENT — PAIN DESCRIPTION - FREQUENCY: FREQUENCY: INTERMITTENT

## 2022-07-31 ASSESSMENT — PAIN DESCRIPTION - DESCRIPTORS: DESCRIPTORS: TINGLING

## 2022-07-31 NOTE — PLAN OF CARE
Problem: Safety - Adult  Goal: Free from fall injury  Outcome: Progressing     Problem: Skin/Tissue Integrity  Goal: Absence of new skin breakdown  Outcome: Progressing

## 2022-07-31 NOTE — PROGRESS NOTES
Pt stated he cnt tolerate the Bipap with the 840 vent and want to sleep with oxygen , family member will bring home unit   Later on today.

## 2022-07-31 NOTE — PROGRESS NOTES
Department of Internal Medicine  General Internal Medicine   Progress Note      SUBJECTIVE: moderat SOB , cough and congestion needing 5-6 L O2 , bowels constipated     History obtained from chart review and the patient  General ROS: positive for  - fatigue, malaise, and weight gain  negative for - chills, fever, or night sweats  Psychological ROS: negative  Ophthalmic ROS: negative  Respiratory ROS: positive for - cough and shortness of breath  negative for - hemoptysis, pleuritic pain, or wheezing  Cardiovascular ROS: positive for - dyspnea on exertion, orthopnea, and shortness of breath  negative for - chest pain, loss of consciousness, palpitations, or paroxysmal nocturnal dyspnea  Gastrointestinal ROS: no abdominal pain, change in bowel habits, or black or bloody stools, bowels constipated   Genito-Urinary ROS: no dysuria, trouble voiding, or hematuria  Musculoskeletal ROS: negative  Neurological ROS: no TIA or stroke symptoms  Dermatological ROS: negative    OBJECTIVE      Medications      Current Facility-Administered Medications: polyethylene glycol (GLYCOLAX) packet 17 g, 17 g, Oral, Daily  sennosides-docusate sodium (SENOKOT-S) 8.6-50 MG tablet 2 tablet, 2 tablet, Oral, Daily  gabapentin (NEURONTIN) capsule 600 mg, 600 mg, Oral, Daily **AND** gabapentin (NEURONTIN) capsule 300 mg, 300 mg, Oral, TID  albuterol (PROVENTIL) nebulizer solution 2.5 mg, 2.5 mg, Nebulization, Q4H PRN  amitriptyline (ELAVIL) tablet 10 mg, 10 mg, Oral, Nightly  amLODIPine (NORVASC) tablet 10 mg, 10 mg, Oral, Daily  vitamin B-12 (CYANOCOBALAMIN) tablet 250 mcg, 250 mcg, Oral, Daily  finasteride (PROSCAR) tablet 5 mg, 5 mg, Oral, Daily  mometasone-formoterol (DULERA) 100-5 MCG/ACT inhaler 2 puff, 2 puff, Inhalation, BID  perflutren lipid microspheres (DEFINITY) injection 1.65 mg, 1.5 mL, IntraVENous, ONCE PRN  [Held by provider] furosemide (LASIX) injection 20 mg, 20 mg, IntraVENous, Daily  enoxaparin Sodium (LOVENOX) injection 30 mg, 30 mg, SubCUTAneous, BID  pantoprazole (PROTONIX) tablet 40 mg, 40 mg, Oral, Daily  thiamine mononitrate tablet 100 mg, 100 mg, Oral, Daily  cefUROXime (CEFTIN) tablet 500 mg, 500 mg, Oral, 2 times per day    Physical      Vitals: BP (!) 149/68   Pulse 76   Temp 98.1 °F (36.7 °C) (Oral)   Resp 18   Ht 5' 10\" (1.778 m)   Wt 245 lb 11.2 oz (111.4 kg)   SpO2 92%   BMI 35.25 kg/m²   Temp: Temp: 98.1 °F (36.7 °C)  Max: Temp  Av.1 °F (36.7 °C)  Min: 97.5 °F (36.4 °C)  Max: 98.7 °F (37.1 °C)  Respiration range:  Resp  Av.5  Min: 16  Max: 20  Pulse Range:  Pulse  Av.3  Min: 66  Max: 81  Blood pressure range:  Systolic (16HXP), OVE:872 , Min:131 , KTY:831   , Diastolic (74HBH), AJW:12, Min:62, Max:68    SpO2  Av.6 %  Min: 90 %  Max: 96 %    Intake/Output Summary (Last 24 hours) at 2022 1445  Last data filed at 2022 1400  Gross per 24 hour   Intake 720 ml   Output 2125 ml   Net -1405 ml       Vent settings:  Pulse  Av  Min: 66  Max: 120  Resp  Av.7  Min: 16  Max: 34  SpO2  Av.5 %  Min: 89 %  Max: 96 %    CONSTITUTIONAL:  fatigued, alert, cooperative, mild distress, appears younger than stated age, and morbidly obese  EYES:  unremarkable   NECK:  mild JVD  and supple, symmetrical, trachea midline  BACK:  symmetric and no curvature  LUNGS:  tachypneic, Moderate respiratory distress, moderate air exchange, no retractions, and crackles scattered , rhonchi scattered   CARDIOVASCULAR:  normal apical pulses, regular rate and rhythm, normal S1 and S2, and no S3  ABDOMEN:  soft non tender BS +   MUSCULOSKELETAL:  + edema   NEUROLOGIC:  grossly intact ,   SKIN:  warm and dry  and no bruising or bleeding    Data      Recent Results (from the past 96 hour(s))   CBC with Auto Differential    Collection Time: 22 11:39 AM   Result Value Ref Range    WBC 11.5 (H) 4.0 - 11.0 K/uL    RBC 4.69 4.20 - 5.90 M/uL    Hemoglobin 13.9 13.5 - 17.5 g/dL    Hematocrit 43.1 40.5 - 52.5 % MCV 91.9 80.0 - 100.0 fL    MCH 29.5 26.0 - 34.0 pg    MCHC 32.2 31.0 - 36.0 g/dL    RDW 16.1 (H) 12.4 - 15.4 %    Platelets 861 304 - 810 K/uL    MPV 7.9 5.0 - 10.5 fL    Neutrophils % 86.9 %    Lymphocytes % 10.6 %    Monocytes % 2.1 %    Eosinophils % 0.3 %    Basophils % 0.1 %    Neutrophils Absolute 10.0 (H) 1.7 - 7.7 K/uL    Lymphocytes Absolute 1.2 1.0 - 5.1 K/uL    Monocytes Absolute 0.2 0.0 - 1.3 K/uL    Eosinophils Absolute 0.0 0.0 - 0.6 K/uL    Basophils Absolute 0.0 0.0 - 0.2 K/uL   Protime-INR    Collection Time: 07/29/22 11:39 AM   Result Value Ref Range    Protime 13.3 11.7 - 14.5 sec    INR 1.02 0.87 - 1.14   Culture, Blood 1    Collection Time: 07/29/22 11:39 AM    Specimen: Blood   Result Value Ref Range    Blood Culture, Routine       No Growth to date. Any change in status will be called. Lactate, Sepsis    Collection Time: 07/29/22 11:39 AM   Result Value Ref Range    Lactic Acid, Sepsis 2.4 (H) 0.4 - 1.9 mmol/L   EKG 12 Lead    Collection Time: 07/29/22 11:42 AM   Result Value Ref Range    Ventricular Rate 119 BPM    Atrial Rate 214 BPM    QRS Duration 102 ms    Q-T Interval 310 ms    QTc Calculation (Bazett) 436 ms    R Axis -46 degrees    T Axis 70 degrees    Diagnosis       Undetermined rhythmLeft anterior fascicular blockAbnormal ECGConfirmed by Victor Hugo Chambers ([id number]) on 7/29/2022 1:09:24 PM   Culture, Blood 2    Collection Time: 07/29/22 11:49 AM    Specimen: Blood   Result Value Ref Range    Culture, Blood 2       No Growth to date. Any change in status will be called.    SPECIMEN REJECTION    Collection Time: 07/29/22 12:32 PM   Result Value Ref Range    Rejected Test bmp     Reason for Rejection see below    Basic Metabolic Panel    Collection Time: 07/29/22  2:10 PM   Result Value Ref Range    Sodium 144 136 - 145 mmol/L    Potassium 4.6 3.5 - 5.1 mmol/L    Chloride 103 99 - 110 mmol/L    CO2 30 21 - 32 mmol/L    Anion Gap 11 3 - 16    Glucose 158 (H) 70 - 99 mg/dL    BUN 17 7 - 20 - 10.6 mg/dL   Basic Metabolic Panel    Collection Time: 07/31/22  4:51 AM   Result Value Ref Range    Sodium 142 136 - 145 mmol/L    Potassium 4.2 3.5 - 5.1 mmol/L    Chloride 100 99 - 110 mmol/L    CO2 36 (H) 21 - 32 mmol/L    Anion Gap 6 3 - 16    Glucose 133 (H) 70 - 99 mg/dL    BUN 36 (H) 7 - 20 mg/dL    Creatinine 1.2 0.8 - 1.3 mg/dL    GFR Non- 56 (A) >60    GFR African American >60 >60    Calcium 9.2 8.3 - 10.6 mg/dL       ASSESSMENT AND PLAN     Hospital Problems             Last Modified POA    * (Principal) Acute combined systolic and diastolic CHF, NYHA class 1 ([address] 7/29/2022 Yes    Pneumonia 7/29/2022 Yes    Type 2 diabetes mellitus ([address] 7/29/2022 Yes    Acute respiratory failure with hypoxia and hypercapnia ([address] 7/29/2022 Yes    Debility 7/29/2022 Yes    Acute exacerbation of chronic obstructive pulmonary disease (COPD) ([address] 7/29/2022 Yes    Hyperkalemia 7/30/2022 Yes    Peripheral neuropathy 7/30/2022 Yes    Esophageal reflux 7/30/2022 Yes    HTN (hypertension) 7/30/2022 Yes     PO Cefuroxime    IV Lasix    May need Home O2    Patient is a Limited code    Repeat CXR in am  Monitor renal function

## 2022-07-31 NOTE — PLAN OF CARE
Problem: Safety - Adult  Goal: Free from fall injury  7/31/2022 1049 by Valere Boxer, RN  Outcome: Progressing     Problem: Skin/Tissue Integrity  Goal: Absence of new skin breakdown  Description: 1. Monitor for areas of redness and/or skin breakdown  2. Assess vascular access sites hourly  3. Every 4-6 hours minimum:  Change oxygen saturation probe site  4. Every 4-6 hours:  If on nasal continuous positive airway pressure, respiratory therapy assess nares and determine need for appliance change or resting period.   7/31/2022 1049 by Valere Boxer, RN  Outcome: Progressing     Problem: Pain  Goal: Verbalizes/displays adequate comfort level or baseline comfort level  Outcome: Progressing     Problem: Neurosensory - Adult  Goal: Achieves stable or improved neurological status  Outcome: Progressing     Problem: Cardiovascular - Adult  Goal: Maintains optimal cardiac output and hemodynamic stability  Outcome: Progressing     Problem: Cardiovascular - Adult  Goal: Absence of cardiac dysrhythmias or at baseline  Outcome: Progressing     Problem: Skin/Tissue Integrity - Adult  Goal: Skin integrity remains intact  Outcome: Progressing     Problem: Gastrointestinal - Adult  Goal: Maintains or returns to baseline bowel function  Outcome: Progressing     Problem: Genitourinary - Adult  Goal: Urinary catheter remains patent  Outcome: Progressing     Problem: Infection - Adult  Goal: Absence of infection at discharge  Outcome: Progressing     Problem: Metabolic/Fluid and Electrolytes - Adult  Goal: Electrolytes maintained within normal limits  Outcome: Progressing     Problem: Metabolic/Fluid and Electrolytes - Adult  Goal: Hemodynamic stability and optimal renal function maintained  Outcome: Progressing     Problem: Hematologic - Adult  Goal: Maintains hematologic stability  Outcome: Progressing

## 2022-07-31 NOTE — PROGRESS NOTES
Pt had coughing fit after eating a bite of hash browns. Eggs and has browns removed. This nurse left a message for SLP to reevaluate pt. Pt reminded to tuck his chin. Pt ate applesauce and drank coffee without problem .

## 2022-08-01 ENCOUNTER — TELEPHONE (OUTPATIENT)
Dept: PULMONOLOGY | Age: 87
End: 2022-08-01

## 2022-08-01 LAB
LV EF: 60 %
LVEF MODALITY: NORMAL

## 2022-08-01 PROCEDURE — 92526 ORAL FUNCTION THERAPY: CPT

## 2022-08-01 PROCEDURE — 6370000000 HC RX 637 (ALT 250 FOR IP): Performed by: INTERNAL MEDICINE

## 2022-08-01 PROCEDURE — 94640 AIRWAY INHALATION TREATMENT: CPT

## 2022-08-01 PROCEDURE — 6360000002 HC RX W HCPCS: Performed by: INTERNAL MEDICINE

## 2022-08-01 PROCEDURE — 1200000000 HC SEMI PRIVATE

## 2022-08-01 PROCEDURE — 93306 TTE W/DOPPLER COMPLETE: CPT

## 2022-08-01 RX ADMIN — AMLODIPINE BESYLATE 10 MG: 5 TABLET ORAL at 09:22

## 2022-08-01 RX ADMIN — STANDARDIZED SENNA CONCENTRATE AND DOCUSATE SODIUM 2 TABLET: 8.6; 5 TABLET ORAL at 09:24

## 2022-08-01 RX ADMIN — AMITRIPTYLINE HYDROCHLORIDE 10 MG: 10 TABLET, FILM COATED ORAL at 21:37

## 2022-08-01 RX ADMIN — ENOXAPARIN SODIUM 30 MG: 100 INJECTION SUBCUTANEOUS at 21:36

## 2022-08-01 RX ADMIN — LOSARTAN POTASSIUM 50 MG: 25 TABLET, FILM COATED ORAL at 09:21

## 2022-08-01 RX ADMIN — Medication 2 PUFF: at 20:35

## 2022-08-01 RX ADMIN — GABAPENTIN 600 MG: 300 CAPSULE ORAL at 09:22

## 2022-08-01 RX ADMIN — FINASTERIDE 5 MG: 5 TABLET, FILM COATED ORAL at 09:23

## 2022-08-01 RX ADMIN — ENOXAPARIN SODIUM 30 MG: 100 INJECTION SUBCUTANEOUS at 09:21

## 2022-08-01 RX ADMIN — CEFUROXIME AXETIL 500 MG: 250 TABLET ORAL at 09:22

## 2022-08-01 RX ADMIN — GABAPENTIN 300 MG: 300 CAPSULE ORAL at 13:53

## 2022-08-01 RX ADMIN — Medication 250 MCG: at 09:35

## 2022-08-01 RX ADMIN — PANTOPRAZOLE SODIUM 40 MG: 40 TABLET, DELAYED RELEASE ORAL at 09:22

## 2022-08-01 RX ADMIN — CEFUROXIME AXETIL 500 MG: 250 TABLET ORAL at 21:37

## 2022-08-01 RX ADMIN — THIAMINE HCL TAB 100 MG 100 MG: 100 TAB at 09:23

## 2022-08-01 RX ADMIN — POLYETHYLENE GLYCOL 3350 17 G: 17 POWDER, FOR SOLUTION ORAL at 09:21

## 2022-08-01 RX ADMIN — GABAPENTIN 300 MG: 300 CAPSULE ORAL at 18:34

## 2022-08-01 RX ADMIN — GABAPENTIN 300 MG: 300 CAPSULE ORAL at 21:37

## 2022-08-01 ASSESSMENT — PAIN SCALES - GENERAL: PAINLEVEL_OUTOF10: 0

## 2022-08-01 NOTE — PROGRESS NOTES
Facility/Department: [address] NURSING  Speech Language Pathology   Dysphagia Treatment Note    Patient: Nereida Hare   : 1925   MRN: [id number]      Evaluation Date: 2022      Admitting Dx: Acute combined systolic and diastolic CHF, NYHA class 1 ([facility] [L98.92]  Acute diastolic CHF (congestive heart failure) ([facility] [I50.31]  Treatment Diagnosis: Oropharyngeal Dysphagia   Pain: Denies                                              Diet and Treatment Recommendations 2022:  Diet Solids Recommendation:  Dysphagia III Soft and bite sized  Liquid Consistency Recommendation: Thin liquids, No straws  Recommended form of Meds: Meds in puree         Compensatory strategies:   Upright as possible with all PO intake , No straws , Small bites/sips , Eat/feed slowly    Assessment of Texture Tolerance:  Diet level prior to treatment: Dysphagia III Soft and bite sized , Thin liquids, No straws   Tolerance of Current Diet Level:Report of pt demonstrating s/s of aspiration with intake (RN reported coughing episode with hash browns), pt reported he does not recall this event. Impressions: Pt was positioned Upright in chair, awake and alert. Currently on  2L O2 via nasal cannula . Trials of thin liquids, puree , soft solids, and regular solids  were provided to assess swallow function. Pt demonstrated appropriate feeding rate but large bite size. Demonstrated one instance of potential pharyngeal pooling vs penetration/aspiration after intake of mixed consistency. Mastication was mildly prolonged but functional for adequate oral clearance. Pt demonstrated clinical s/s delayed swallow initiation and concern for premature bolus loss. Pt reported recognition of safe swallow strategies but stated he has a hard time remembering to use them while eating. He would benefit from ongoing tx upon d/c from hospital to improve carry over in his home environment.    Pt demonstrates increased risk for aspiration due to respiratory status  and prior hx of dysphagia . Based on today's assessment recommend Dysphagia III Soft and bite sized  with Thin liquids, No straws , Meds in puree . If s/s of aspiration persist, recommend repeat MBS. Dysphagia Goals:   Pt will functionally tolerate recommended diet with no overt clinical s/s of aspiration (Ongoing 08/01/22)  Pt will demonstrate understanding of aspiration risk and precautions via education/demonstration with occasional prompting (Ongoing 08/01/22)  Pt will advance to least restrictive diet as indicated (Ongoing 08/01/22)  If clinical s/s of aspiration/penetration continue to be noted, Pt will participate in Modified Barium Swallow Study (Ongoing 08/01/22)    Plan:   3-5 times per week during acute care stay. Patient/Family Education:  Provided education regarding role of SLP, recommendations and general speech pathology plan of care. [x] Pt verbalized understanding and agreement   [x] Pt requires ongoing learning   [] No evidence of comprehension     Discharge Recommendations:    Recommend ongoing SLP for dysphagia therapy upon discharge from hospital     Timed Code Treatment:  0    Total Treatment Time:  12 minutes    If patient discharges prior to next session this note will serve as a discharge summary. Signature:  Makayla Patterson M.A.  CCC-SLP JOSSELINE [id number]  Speech-Language Pathologist   8/1/2022 3:15 PM

## 2022-08-01 NOTE — PLAN OF CARE
Problem: Safety - Adult  Goal: Free from fall injury  7/31/2022 2144 by Monica Tomlinson RN  Outcome: Progressing     Problem: Skin/Tissue Integrity  Goal: Absence of new skin breakdown  7/31/2022 2144 by Monica Tomlinson RN  Outcome: Progressing

## 2022-08-01 NOTE — TELEPHONE ENCOUNTER
[address] Daughter Yrismerlyn Jason and canceled his appoinment  For Thursday August 4th and stated  is Admitted at [facility].  Mrs Dulce Maria Tejeda wanted to know if Dr Edmund Avitia would visit [address] in the Hospital? Suzie Deal Number is [phone].

## 2022-08-01 NOTE — CARE COORDINATION
08/01/22 1033   Service Assessment   Patient Orientation Alert and Oriented   History Provided By Patient   PCP Verified by CM Yes   Last Visit to PCP Within last 6 months   Prior Functional Level Independent in ADLs/IADLs  (Uses a rolling walker sometimes)   Social/Functional History   Lives With Spouse   Type of Home Condo  (Independent Living Condo at [facility])   Home Equipment Deborah Magana, lou;Oxygen  (O2 leonel Ovalle)   Active  No  (Kids drive to/from appointments)   Services At/After Discharge   Services At/After Discharge   (None anticipated at this time.)   Confirm Follow Up Transport Family   Condition of Participation: Discharge Planning   The Plan for Transition of Care is related to the following treatment goals: No needs identified at this time. Informed pt to have family bring O2 tank at discharge.      Electronically signed by MEEK Nielsen, RAYMONDW on 8/1/2022 at 10:39 AM

## 2022-08-02 VITALS
WEIGHT: 243.5 LBS | DIASTOLIC BLOOD PRESSURE: 67 MMHG | HEART RATE: 72 BPM | BODY MASS INDEX: 34.86 KG/M2 | RESPIRATION RATE: 18 BRPM | TEMPERATURE: 97.4 F | HEIGHT: 70 IN | SYSTOLIC BLOOD PRESSURE: 152 MMHG | OXYGEN SATURATION: 92 %

## 2022-08-02 LAB
ALBUMIN SERPL-MCNC: 3.6 G/DL (ref 3.4–5)
ANION GAP SERPL CALCULATED.3IONS-SCNC: 5 MMOL/L (ref 3–16)
BLOOD CULTURE, ROUTINE: NORMAL
BUN BLDV-MCNC: 19 MG/DL (ref 7–20)
CALCIUM SERPL-MCNC: 9.8 MG/DL (ref 8.3–10.6)
CHLORIDE BLD-SCNC: 99 MMOL/L (ref 99–110)
CO2: 36 MMOL/L (ref 21–32)
CREAT SERPL-MCNC: 0.9 MG/DL (ref 0.8–1.3)
CULTURE, BLOOD 2: NORMAL
GFR AFRICAN AMERICAN: >60
GFR NON-AFRICAN AMERICAN: >60
GLUCOSE BLD-MCNC: 178 MG/DL (ref 70–99)
HCT VFR BLD CALC: 41.4 % (ref 40.5–52.5)
HEMOGLOBIN: 13.5 G/DL (ref 13.5–17.5)
MCH RBC QN AUTO: 30.1 PG (ref 26–34)
MCHC RBC AUTO-ENTMCNC: 32.8 G/DL (ref 31–36)
MCV RBC AUTO: 91.9 FL (ref 80–100)
PDW BLD-RTO: 16 % (ref 12.4–15.4)
PHOSPHORUS: 3.4 MG/DL (ref 2.5–4.9)
PLATELET # BLD: 202 K/UL (ref 135–450)
PMV BLD AUTO: 7.9 FL (ref 5–10.5)
POTASSIUM SERPL-SCNC: 4 MMOL/L (ref 3.5–5.1)
RBC # BLD: 4.5 M/UL (ref 4.2–5.9)
SODIUM BLD-SCNC: 140 MMOL/L (ref 136–145)
WBC # BLD: 4.8 K/UL (ref 4–11)

## 2022-08-02 PROCEDURE — 97530 THERAPEUTIC ACTIVITIES: CPT

## 2022-08-02 PROCEDURE — 97535 SELF CARE MNGMENT TRAINING: CPT

## 2022-08-02 PROCEDURE — 93306 TTE W/DOPPLER COMPLETE: CPT

## 2022-08-02 PROCEDURE — 85027 COMPLETE CBC AUTOMATED: CPT

## 2022-08-02 PROCEDURE — 97116 GAIT TRAINING THERAPY: CPT

## 2022-08-02 PROCEDURE — 97162 PT EVAL MOD COMPLEX 30 MIN: CPT

## 2022-08-02 PROCEDURE — 80069 RENAL FUNCTION PANEL: CPT

## 2022-08-02 PROCEDURE — 97165 OT EVAL LOW COMPLEX 30 MIN: CPT

## 2022-08-02 PROCEDURE — 36415 COLL VENOUS BLD VENIPUNCTURE: CPT

## 2022-08-02 PROCEDURE — 6370000000 HC RX 637 (ALT 250 FOR IP): Performed by: INTERNAL MEDICINE

## 2022-08-02 RX ORDER — LOSARTAN POTASSIUM 50 MG/1
50 TABLET ORAL DAILY
Qty: 30 TABLET | Refills: 3 | Status: SHIPPED | OUTPATIENT
Start: 2022-08-03

## 2022-08-02 RX ORDER — CEFUROXIME AXETIL 500 MG/1
500 TABLET ORAL EVERY 12 HOURS SCHEDULED
Qty: 10 TABLET | Refills: 0 | Status: SHIPPED | OUTPATIENT
Start: 2022-08-02 | End: 2022-08-07

## 2022-08-02 RX ORDER — POLYETHYLENE GLYCOL 3350 17 G/17G
17 POWDER, FOR SOLUTION ORAL DAILY
Qty: 527 G | Refills: 1 | Status: SHIPPED | OUTPATIENT
Start: 2022-08-03 | End: 2022-09-02

## 2022-08-02 RX ORDER — SENNA AND DOCUSATE SODIUM 50; 8.6 MG/1; MG/1
2 TABLET, FILM COATED ORAL DAILY
COMMUNITY
Start: 2022-08-03

## 2022-08-02 RX ORDER — FUROSEMIDE 20 MG/1
20 TABLET ORAL EVERY OTHER DAY
Qty: 60 TABLET | Refills: 3 | Status: SHIPPED | OUTPATIENT
Start: 2022-08-02

## 2022-08-02 RX ADMIN — FINASTERIDE 5 MG: 5 TABLET, FILM COATED ORAL at 09:04

## 2022-08-02 RX ADMIN — Medication 250 MCG: at 09:07

## 2022-08-02 RX ADMIN — PANTOPRAZOLE SODIUM 40 MG: 40 TABLET, DELAYED RELEASE ORAL at 09:04

## 2022-08-02 RX ADMIN — GABAPENTIN 600 MG: 300 CAPSULE ORAL at 09:03

## 2022-08-02 RX ADMIN — Medication 2 PUFF: at 09:09

## 2022-08-02 RX ADMIN — THIAMINE HCL TAB 100 MG 100 MG: 100 TAB at 09:04

## 2022-08-02 RX ADMIN — LOSARTAN POTASSIUM 50 MG: 25 TABLET, FILM COATED ORAL at 09:04

## 2022-08-02 RX ADMIN — AMLODIPINE BESYLATE 10 MG: 5 TABLET ORAL at 09:04

## 2022-08-02 RX ADMIN — CEFUROXIME AXETIL 500 MG: 250 TABLET ORAL at 09:04

## 2022-08-02 ASSESSMENT — PAIN SCALES - GENERAL
PAINLEVEL_OUTOF10: 0
PAINLEVEL_OUTOF10: 0

## 2022-08-02 NOTE — PROGRESS NOTES
Data- discharge order received, pt verbalized agreement to discharge, [facility]  , Nellie Brown reviewed and signed by MD, to be completed by RN. Action- AVS prepared, discharge instructions prepared and given to pt, reviewed with pts son and daughter over the phone, medication information packet given r/t NEW or CHANGED prescriptions, pt verbalized understanding further self-review. D/C instruction summary: Diet- general, Activity- as ricky, follow up with Primary Care Physician Cortney Patel III, [phone] appointment 8/8/2022, immunizations reviewed , medications prescriptions to be filled @ pts pharmacy. Inpatient surgical procedural reviewed: n/a. Contact information provided to above agencies used. Response- Case Management/ reported faxing completed RONY and AVS to needed HHC/DME services stated above. Pt belongings gathered, IV removed, pt dressed . Disposition is home with HHC/DME as stated above, transported with son, taken to lobby via w/c with PCA, no complications. 1. WEIGHT: Admit Weight: 248 lb 9.6 oz (112.8 kg) (07/29/22 1138)        Today  Weight: 243 lb 8 oz (110.5 kg) (08/02/22 0424)       2.  O2 SAT.: SpO2: 92 % (08/02/22 0839)

## 2022-08-02 NOTE — DISCHARGE INSTR - COC
Continuity of Care Form    Patient Name: Bridgett Chappell   :  1925  MRN:  [id number]    Admit date:  2022  Discharge date:  2022    Code Status Order: Limited   Advance Directives:     Admitting Physician:  Alise Aquino MD  PCP: Shaye Houston III, MD    Discharging Nurse: [facility] Unit/Room#: 3AN-3313/3313-01  Discharging Unit Phone Number: [phone]    Emergency Contact:   Extended Emergency Contact Information  Primary Emergency Contact: Nadia Phone: [phone]  Relation: Child  Secondary Emergency Contact: Verónica Palencia  Address: [address] [place] [address] Phone: [phone]  Mobile Phone: [phone]  Relation: Child    Past Surgical History:  Past Surgical History:   Procedure Laterality Date    ANKLE SURGERY      CATARACT REMOVAL  10-03    Right    COLONOSCOPY  10-02    Normal    KNEE SURGERY      Arthroscopic Right    REVISION TOTAL KNEE ARTHROPLASTY  09    Infected knee prosthesis removed, space placed(Right knee)    REVISION TOTAL KNEE ARTHROPLASTY  09    Right knee spacer removed and new joint inserted    SHOULDER SURGERY  02/09/10    Total Shoulder arthroplasty    TONSILLECTOMY  192    TOTAL KNEE ARTHROPLASTY  07    Right    TOTAL KNEE ARTHROPLASTY      Left       Immunization History:   Immunization History   Administered Date(s) Administered    COVID-19, PFIZER PURPLE top, DILUTE for use, (age 15 y+), 30mcg/0.3mL 2021    Influenza Whole 09/15/2009    Pneumococcal Conjugate 7-valent (Samaria Sender) 10/22/1998    Td, unspecified formulation 2009    Zoster Live (Zostavax) 2010       Active Problems:  Patient Active Problem List   Diagnosis Code    Peripheral neuropathy G62.9    Type II or unspecified type diabetes mellitus without mention of complication, not stated as uncontrolled E11.9    Esophageal reflux K21.9    HTN (hypertension) I10 Pneumonia J18.9    Type 2 diabetes mellitus ([facility]) E11.9    Acute respiratory failure with hypoxia and hypercapnia ([facility]) J96.01, J96.02    Debility R53.81    Urinary retention R33.9    Acute exacerbation of chronic obstructive pulmonary disease (COPD) ([facility]) J44.1    Acute combined systolic and diastolic CHF, NYHA class 1 ([facility]) I50.41    Hyperkalemia E87.5       Isolation/Infection:   Isolation            No Isolation          Patient Infection Status       Infection Onset Added Last Indicated Last Indicated By Review Planned Expiration Resolved Resolved By    None active    Resolved    COVID-19 (Rule Out) 04/22/22 04/22/22 04/22/22 Respiratory Panel, Molecular, with COVID-19 (Restricted: peds pts or suitable admitted adults) (Ordered)   04/22/22 Rule-Out Test Resulted    COVID-19 (Rule Out) 04/20/22 04/20/22 04/21/22 COVID-19 & Influenza Combo (Ordered)   04/21/22 Rule-Out Test Resulted            Nurse Assessment:  Last Vital Signs: BP (!) 152/67   Pulse 72   Temp 97.4 °F (36.3 °C) (Oral)   Resp 18   Ht 5' 10\" (1.778 m)   Wt 243 lb 8 oz (110.5 kg)   SpO2 92%   BMI 34.94 kg/m²     Last documented pain score (0-10 scale): Pain Level: 0  Last Weight:   Wt Readings from Last 1 Encounters:   08/02/22 243 lb 8 oz (110.5 kg)     Mental Status:  oriented and alert    IV Access:  - None    Nursing Mobility/ADLs:  Walking   Independent  Transfer  Independent  Bathing  Assisted  Dressing  Assisted  Toileting  Independent  Feeding  Independent  Med Admin  Independent  Med Delivery   whole    Wound Care Documentation and Therapy:        Elimination:  Continence: Bowel: Yes  Bladder: Yes  Urinary Catheter: Removal Date 8/1/2022    Colostomy/Ileostomy/Ileal Conduit: No       Date of Last BM: 8/2/2022    Intake/Output Summary (Last 24 hours) at 8/2/2022 1041  Last data filed at 8/2/2022 0908  Gross per 24 hour   Intake 780 ml   Output 1650 ml   Net -870 ml     I/O last 3 completed shifts:   In: 780 [P.O.:780]  Out: 2700 [Urine:2700]    Safety Concerns: At Risk for Falls    Impairments/Disabilities:      None    Nutrition Therapy:  Current Nutrition Therapy:   - Oral Diet:  Dysphagia 3 advanced and NO STRAWS, meds in puree     Routes of Feeding: Oral  Liquids: Thin Liquids  Daily Fluid Restriction: no  Last Modified Barium Swallow with Video (Video Swallowing Test): not done    Treatments at the Time of Hospital Discharge:   Respiratory Treatments:   Oxygen Therapy:  is on oxygen at 2 L/min per nasal cannula. Ventilator:    - cpap at night     Rehab Therapies: Physical Therapy, Occupational Therapy, and Speech/Language Therapy  Weight Bearing Status/Restrictions: No weight bearing restrictions  Other Medical Equipment (for information only, NOT a DME order):  walker  Other Treatments:     Patient's personal belongings (please select all that are sent with patient):  None    RN SIGNATURE:  Electronically signed by Skip Jeans, RN on 8/2/22 at 12:32 PM EDT    CASE MANAGEMENT/SOCIAL WORK SECTION    Inpatient Status Date: 7/29/2022    Readmission Risk Assessment Score:  Readmission Risk              Risk of Unplanned Readmission:  [id number]           Discharging to Facility/ [address] [facility]  Phone: [phone]  Fax: [phone]     / signature: Electronically signed by MEEK Colon on 8/2/22 at 1:07 PM EDT    PHYSICIAN SECTION    Prognosis: Guarded    Condition at Discharge: Stable    Rehab Potential (if transferring to Rehab): Fair    Recommended Labs or Other Treatments After Discharge: PT OT     Physician Certification: I certify the above information and transfer of Naya Loja  is necessary for the continuing treatment of the diagnosis listed and that he requires [facility] for less 30 days.      Update Admission H&P: No change in H&P    PHYSICIAN SIGNATURE:  Electronically signed by Diana Santa MD on 8/2/22 at 10:42 AM EDT

## 2022-08-02 NOTE — PROGRESS NOTES
Department of Internal Medicine  General Internal Medicine   Progress Note      SUBJECTIVE: some congested cough , O2 requirement declined     History obtained from chart review and the patient  General ROS: positive for  - fatigue, malaise, and weight gain  negative for - chills, fever, or night sweats  Psychological ROS: negative  Ophthalmic ROS: negative  Respiratory ROS: positive for - cough and shortness of breath  negative for - hemoptysis, pleuritic pain, or wheezing  Cardiovascular ROS: positive for - dyspnea on exertion, orthopnea, and shortness of breath  negative for - chest pain, loss of consciousness, palpitations, or paroxysmal nocturnal dyspnea  Gastrointestinal ROS: no abdominal pain, change in bowel habits, or black or bloody stools, bowels constipated   Genito-Urinary ROS: no dysuria, trouble voiding, or hematuria  Musculoskeletal ROS: negative  Neurological ROS: no TIA or stroke symptoms  Dermatological ROS: negative    OBJECTIVE      Medications      Current Facility-Administered Medications: polyethylene glycol (GLYCOLAX) packet 17 g, 17 g, Oral, Daily  sennosides-docusate sodium (SENOKOT-S) 8.6-50 MG tablet 2 tablet, 2 tablet, Oral, Daily  losartan (COZAAR) tablet 50 mg, 50 mg, Oral, Daily  gabapentin (NEURONTIN) capsule 600 mg, 600 mg, Oral, Daily **AND** gabapentin (NEURONTIN) capsule 300 mg, 300 mg, Oral, TID  albuterol (PROVENTIL) nebulizer solution 2.5 mg, 2.5 mg, Nebulization, Q4H PRN  amitriptyline (ELAVIL) tablet 10 mg, 10 mg, Oral, Nightly  amLODIPine (NORVASC) tablet 10 mg, 10 mg, Oral, Daily  vitamin B-12 (CYANOCOBALAMIN) tablet 250 mcg, 250 mcg, Oral, Daily  finasteride (PROSCAR) tablet 5 mg, 5 mg, Oral, Daily  mometasone-formoterol (DULERA) 100-5 MCG/ACT inhaler 2 puff, 2 puff, Inhalation, BID  perflutren lipid microspheres (DEFINITY) injection 1.65 mg, 1.5 mL, IntraVENous, ONCE PRN  [Held by provider] furosemide (LASIX) injection 20 mg, 20 mg, IntraVENous, Daily  enoxaparin Sodium (LOVENOX) injection 30 mg, 30 mg, SubCUTAneous, BID  pantoprazole (PROTONIX) tablet 40 mg, 40 mg, Oral, Daily  thiamine mononitrate tablet 100 mg, 100 mg, Oral, Daily  cefUROXime (CEFTIN) tablet 500 mg, 500 mg, Oral, 2 times per day    Physical      Vitals: BP (!) 152/67   Pulse 72   Temp 97.4 °F (36.3 °C) (Oral)   Resp 18   Ht 5' 10\" (1.778 m)   Wt 243 lb 8 oz (110.5 kg)   SpO2 92%   BMI 34.94 kg/m²   Temp: Temp: 97.4 °F (36.3 °C)  Max: Temp  Av.9 °F (36.6 °C)  Min: 97.4 °F (36.3 °C)  Max: 98.1 °F (36.7 °C)  Respiration range:  Resp  Av  Min: 18  Max: 22  Pulse Range:  Pulse  Av.3  Min: 69  Max: 78  Blood pressure range:  Systolic (70DIN), BPW:728 , Min:152 , QDU:477   , Diastolic (03OXC), FFS:05, Min:67, Max:90    SpO2  Av.9 %  Min: 77 %  Max: 93 %    Intake/Output Summary (Last 24 hours) at 2022 1031  Last data filed at 2022 0908  Gross per 24 hour   Intake 780 ml   Output 1650 ml   Net -870 ml       Vent settings:  Pulse  Av.7  Min: 63  Max: 120  Resp  Av.2  Min: 16  Max: 34  SpO2  Av.8 %  Min: 77 %  Max: 96 %    CONSTITUTIONAL:  fatigued, alert, cooperative, mild distress, appears younger than stated age, and morbidly obese  EYES:  unremarkable   NECK:  mild JVD  and supple, symmetrical, trachea midline  BACK:  symmetric and no curvature  LUNGS:  tachypneic, Moderate respiratory distress, moderate air exchange, no retractions, and crackles scattered , rhonchi scattered   CARDIOVASCULAR:  normal apical pulses, regular rate and rhythm, normal S1 and S2, and no S3  ABDOMEN:  soft non tender BS +   MUSCULOSKELETAL:  + edema   NEUROLOGIC:  grossly intact ,   SKIN:  warm and dry  and no bruising or bleeding    Data      Recent Results (from the past 96 hour(s))   CBC with Auto Differential    Collection Time: 22 11:39 AM   Result Value Ref Range    WBC 11.5 (H) 4.0 - 11.0 K/uL    RBC 4.69 4.20 - 5.90 M/uL    Hemoglobin 13.9 13.5 - 17.5 g/dL    Hematocrit 43.1 40.5 - 52.5 %    MCV 91.9 80.0 - 100.0 fL    MCH 29.5 26.0 - 34.0 pg    MCHC 32.2 31.0 - 36.0 g/dL    RDW 16.1 (H) 12.4 - 15.4 %    Platelets 284 900 - 963 K/uL    MPV 7.9 5.0 - 10.5 fL    Neutrophils % 86.9 %    Lymphocytes % 10.6 %    Monocytes % 2.1 %    Eosinophils % 0.3 %    Basophils % 0.1 %    Neutrophils Absolute 10.0 (H) 1.7 - 7.7 K/uL    Lymphocytes Absolute 1.2 1.0 - 5.1 K/uL    Monocytes Absolute 0.2 0.0 - 1.3 K/uL    Eosinophils Absolute 0.0 0.0 - 0.6 K/uL    Basophils Absolute 0.0 0.0 - 0.2 K/uL   Protime-INR    Collection Time: 07/29/22 11:39 AM   Result Value Ref Range    Protime 13.3 11.7 - 14.5 sec    INR 1.02 0.87 - 1.14   Culture, Blood 1    Collection Time: 07/29/22 11:39 AM    Specimen: Blood   Result Value Ref Range    Blood Culture, Routine       No Growth to date. Any change in status will be called. Lactate, Sepsis    Collection Time: 07/29/22 11:39 AM   Result Value Ref Range    Lactic Acid, Sepsis 2.4 (H) 0.4 - 1.9 mmol/L   EKG 12 Lead    Collection Time: 07/29/22 11:42 AM   Result Value Ref Range    Ventricular Rate 119 BPM    Atrial Rate 214 BPM    QRS Duration 102 ms    Q-T Interval 310 ms    QTc Calculation (Bazett) 436 ms    R Axis -46 degrees    T Axis 70 degrees    Diagnosis       Undetermined rhythmLeft anterior fascicular blockAbnormal ECGConfirmed by Amanda Basilio ([id number]) on 7/29/2022 1:09:24 PM   Culture, Blood 2    Collection Time: 07/29/22 11:49 AM    Specimen: Blood   Result Value Ref Range    Culture, Blood 2       No Growth to date. Any change in status will be called.    SPECIMEN REJECTION    Collection Time: 07/29/22 12:32 PM   Result Value Ref Range    Rejected Test bmp     Reason for Rejection see below    Basic Metabolic Panel    Collection Time: 07/29/22  2:10 PM   Result Value Ref Range    Sodium 144 136 - 145 mmol/L    Potassium 4.6 3.5 - 5.1 mmol/L    Chloride 103 99 - 110 mmol/L    CO2 30 21 - 32 mmol/L    Anion Gap 11 3 - 16    Glucose 158 (H) 70 - 99 mg/dL BUN 17 7 - 20 mg/dL    Creatinine 1.1 0.8 - 1.3 mg/dL    GFR Non-African American >60 >60    GFR African American >60 >60    Calcium 9.3 8.3 - 10.6 mg/dL   Troponin    Collection Time: 07/29/22  2:10 PM   Result Value Ref Range    Troponin 0.01 <0.01 ng/mL   Brain Natriuretic Peptide    Collection Time: 07/29/22  2:10 PM   Result Value Ref Range    Pro- (H) 0 - 449 pg/mL   Blood gas, arterial    Collection Time: 07/29/22  5:05 PM   Result Value Ref Range    pH, Arterial 7.306 (L) 7.350 - 7.450    pCO2, Arterial 66.2 (H) 35.0 - 45.0 mmHg    pO2, Arterial 84.5 75.0 - 108.0 mmHg    HCO3, Arterial 33.0 (H) 21.0 - 29.0 mmol/L    Base Excess, Arterial 4.4 (H) -3.0 - 3.0 mmol/L    Hemoglobin, Art, Extended 14.1 13.5 - 17.5 g/dL    O2 Sat, Arterial 96.2 >92 %    Carboxyhgb, Arterial 1.2 0.0 - 1.5 %    Methemoglobin, Arterial 0.0 <1.5 %    TCO2, Arterial 78.4 Not Established mmol/L    O2 Therapy Unknown    Blood gas, venous    Collection Time: 07/29/22  6:50 PM   Result Value Ref Range    pH, Carlos 7.372 7.350 - 7.450    pCO2, Carlos 54.4 (H) 40.0 - 50.0 mmHg    pO2, Carlos 97.7 (H) 25.0 - 40.0 mmHg    HCO3, Venous 31.5 (H) 23.0 - 29.0 mmol/L    Base Excess, Carlos 4.7 (H) -3.0 - 3.0 mmol/L    O2 Sat, Carlos 98 Not Established %    Carboxyhemoglobin 2.8 (H) 0.0 - 1.5 %    MetHgb, Carlos 0.3 <1.5 %    TC02 (Calc), Carlos 74 Not Established mmol/L    O2 Content, Carlos 19 Not Established VOL %    O2 Therapy Unknown    Brain Natriuretic Peptide    Collection Time: 07/29/22  6:50 PM   Result Value Ref Range    Pro-BNP 1,176 (H) 0 - 449 pg/mL   Basic Metabolic Panel    Collection Time: 07/30/22  5:41 AM   Result Value Ref Range    Sodium 139 136 - 145 mmol/L    Potassium 5.1 3.5 - 5.1 mmol/L    Chloride 99 99 - 110 mmol/L    CO2 33 (H) 21 - 32 mmol/L    Anion Gap 7 3 - 16    Glucose 190 (H) 70 - 99 mg/dL    BUN 26 (H) 7 - 20 mg/dL    Creatinine 1.4 (H) 0.8 - 1.3 mg/dL    GFR Non- 47 (A) >60    GFR  57 (A) >60 Calcium 9.2 8.3 - 10.6 mg/dL   Basic Metabolic Panel    Collection Time: 07/31/22  4:51 AM   Result Value Ref Range    Sodium 142 136 - 145 mmol/L    Potassium 4.2 3.5 - 5.1 mmol/L    Chloride 100 99 - 110 mmol/L    CO2 36 (H) 21 - 32 mmol/L    Anion Gap 6 3 - 16    Glucose 133 (H) 70 - 99 mg/dL    BUN 36 (H) 7 - 20 mg/dL    Creatinine 1.2 0.8 - 1.3 mg/dL    GFR Non- 56 (A) >60    GFR African American >60 >60    Calcium 9.2 8.3 - 10.6 mg/dL   CBC    Collection Time: 08/02/22  9:07 AM   Result Value Ref Range    WBC 4.8 4.0 - 11.0 K/uL    RBC 4.50 4. 20 - 5.90 M/uL    Hemoglobin 13.5 13.5 - 17.5 g/dL    Hematocrit 41.4 40.5 - 52.5 %    MCV 91.9 80.0 - 100.0 fL    MCH 30.1 26.0 - 34.0 pg    MCHC 32.8 31.0 - 36.0 g/dL    RDW 16.0 (H) 12.4 - 15.4 %    Platelets 005 827 - 235 K/uL    MPV 7.9 5.0 - 10.5 fL   Renal Function Panel    Collection Time: 08/02/22  9:07 AM   Result Value Ref Range    Sodium 140 136 - 145 mmol/L    Potassium 4.0 3.5 - 5.1 mmol/L    Chloride 99 99 - 110 mmol/L    CO2 36 (H) 21 - 32 mmol/L    Anion Gap 5 3 - 16    Glucose 178 (H) 70 - 99 mg/dL    BUN 19 7 - 20 mg/dL    Creatinine 0.9 0.8 - 1.3 mg/dL    GFR Non-African American >60 >60    GFR African American >60 >60    Calcium 9.8 8.3 - 10.6 mg/dL    Phosphorus 3.4 2.5 - 4.9 mg/dL    Albumin 3.6 3.4 - 5.0 g/dL       ASSESSMENT AND PLAN     Hospital Problems             Last Modified POA    * (Principal) Acute combined systolic and diastolic CHF, NYHA class 1 ([facility] 7/29/2022 Yes    Pneumonia 7/29/2022 Yes    Type 2 diabetes mellitus ([facility] 7/29/2022 Yes    Acute respiratory failure with hypoxia and hypercapnia ([facility] 7/29/2022 Yes    Debility 7/29/2022 Yes    Acute exacerbation of chronic obstructive pulmonary disease (COPD) ([facility] 7/29/2022 Yes    Hyperkalemia 7/30/2022 Yes    Peripheral neuropathy 7/30/2022 Yes    Esophageal reflux 7/30/2022 Yes    HTN (hypertension) 7/30/2022 Yes       Ct present care    CXR showed improvement SLP eval    Closer to dismissal

## 2022-08-02 NOTE — PROGRESS NOTES
[facility] Department   Phone: [phone]    Physical Therapy    [x] Initial Evaluation            [] Daily Treatment Note         [] Discharge Summary      Patient: Bowen Sun   : 1925   MRN: [id number]   Date of Service:  2022  Admitting Diagnosis: Acute combined systolic and diastolic CHF, NYHA class 1 ([address]  Current Admission Summary: Bowen Sun is a 80 y.o. male that presents to the ER for evaluation of acute severe dyspnea,. Increasing and pink frothy sputum positive increase in work of breathing. Unclear if prior history of congestive heart failure. Denies chest pain denies fevers. No hemoptysis. Moderate to severe increase in work of breathing on arrival.  Past Medical History:  has a past medical history of Aspiration pneumonia ([facility] BPH, Dementia ([facility] Elevated prostate specific antigen (PSA), Esophageal reflux, Fracture closed, humerus, HTN (hypertension), Hyperlipidemia, Osteoarthritis, Peripheral neuropathy, Peripheral neuropathy, and Type II or unspecified type diabetes mellitus without mention of complication, not stated as uncontrolled. Past Surgical History:  has a past surgical history that includes knee surgery (); Ankle surgery (); Tonsillectomy (); shoulder surgery (02/09/10); Colonoscopy (10-02); Cataract removal (10-03); Total knee arthroplasty (07); Revision total knee arthroplasty (09); Revision total knee arthroplasty (09); and Total knee arthroplasty (). Discharge Recommendations: Bowen Sun scored a 18/24 on the AM-PAC short mobility form. Current research shows that an AM-PAC score of 18 or greater is typically associated with a discharge to the patient's home setting. Based on the patient's AM-PAC score and their current functional mobility deficits, it is recommended that the patient have 2-3 sessions per week of Physical Therapy at d/c to increase the patient's independence.   At States they are in process of transitioning to assisted living with spouse    Examination   Vision:   Vision Corrective Device: wears glasses at all times  Hearing:   hard of hearing    ROM:   (B) LE AROM WFL  Strength:   (B) LE strength grossly WFL  Decision Making: medium complexity  Clinical Presentation: evolving      Subjective  General: Seated in bedside chair upon arrival with alarm on. Keyonna Da Silva to go home this date. Pain: 0/10  Pain Interventions: not applicable       Functional Mobility  Bed Mobility  Bed mobility not completed on this date. Comments:  Transfers  Sit to stand transfer: stand by assistance  Stand to sit transfer: stand by assistance  Comments:  Ambulation  Surface:level surface  Assistive Device: rolling walker  Other Appliance: supplemental O2, 3L O2  Assistance: contact guard assistance  Distance: 200'  Gait Mechanics: mildly unsteady, 1 minor LOB - able to self-correct (both occurred when RW too far ahead of patient and able to take short/shuffling steps to correct), flexed posture, decreased eric  Comments:  SOB noted following ambulation, SpO2 90% on 3L O2. Recovered to mid-90's following short seated rest break. Stair Mobility  Stair mobility not completed on this date.   Comments:    Balance  Static Sitting Balance: fair (+): maintains balance at SBA/supervision without use of UE support  Dynamic Sitting Balance: fair (+): maintains balance at SBA/supervision without use of UE support  Static Standing Balance: fair (+): maintains balance at SBA/supervision without use of UE support  Dynamic Standing Balance: fair (+): maintains balance at SBA/supervision without use of UE support  Comments:    Other Therapeutic Interventions  See OT note for ADL  Functional Outcomes  AM-PAC Inpatient Mobility Raw Score : 18              Cognition  WFL  Orientation:    alert and oriented x 4  Command Following:   [facility]    Education  Barriers To Learning: hearing  Patient Education: patient educated on goals, PT role and benefits, plan of care, general safety, functional mobility training, discharge recommendations  Learning Assessment:  patient verbalizes and demonstrates understanding    Assessment  Activity Tolerance: limited by SOB and fatigue, but able to continue with rest breaks  Impairments Requiring Therapeutic Intervention: decreased functional mobility, decreased strength, decreased safety awareness, decreased endurance, decreased balance  Prognosis: good  Clinical Assessment: Patient not at baseline function and would benefit from skilled PT to address above deficits and facilitate return to baseline function. Able to walk short distances with RW with CGA. Has a lot of assistance available at home.      Safety Interventions: patient left in chair, chair alarm in place, call light within reach, patient at risk for falls, and nurse notified    Plan  Frequency: 3-5 x/per week  Current Treatment Recommendations: strengthening, balance training, functional mobility training, transfer training, gait training, endurance training, and safety education    Goals  Patient Goals: to go home   Short Term Goals:  Time Frame: discharge  Patient will complete bed mobility at Independent   Patient will complete transfers at Independent   Patient will ambulate 50 ft with use of rolling walker at modified independent    Therapy Session Time      Individual Group Co-treatment   Time In     1151   Time Out     1231   Minutes     40     Timed Code Treatment Minutes:  25 Minutes  Total Treatment Minutes:  40       Electronically Signed By: Justus Smith, PT    Thanks, Justus Smith, PT, DPT [id number]

## 2022-08-02 NOTE — PROGRESS NOTES
Department of Internal Medicine  General Internal Medicine   Progress Note      SUBJECTIVE: sitting up in chair , no obvious distress on 2 L nasal canula     History obtained from chart review and the patient  General ROS: positive for  - fatigue, malaise, and weight gain  negative for - chills, fever, or night sweats  Psychological ROS: negative  Ophthalmic ROS: negative  Respiratory ROS: positive for - cough and shortness of breath  negative for - hemoptysis, pleuritic pain, or wheezing  Cardiovascular ROS: positive for - dyspnea on exertion, orthopnea, and shortness of breath  negative for - chest pain, loss of consciousness, palpitations, or paroxysmal nocturnal dyspnea  Gastrointestinal ROS: no abdominal pain, change in bowel habits, or black or bloody stools, bowels constipated   Genito-Urinary ROS: no dysuria, trouble voiding, or hematuria  Musculoskeletal ROS: negative  Neurological ROS: no TIA or stroke symptoms  Dermatological ROS: negative    OBJECTIVE      Medications      Current Facility-Administered Medications: polyethylene glycol (GLYCOLAX) packet 17 g, 17 g, Oral, Daily  sennosides-docusate sodium (SENOKOT-S) 8.6-50 MG tablet 2 tablet, 2 tablet, Oral, Daily  losartan (COZAAR) tablet 50 mg, 50 mg, Oral, Daily  gabapentin (NEURONTIN) capsule 600 mg, 600 mg, Oral, Daily **AND** gabapentin (NEURONTIN) capsule 300 mg, 300 mg, Oral, TID  albuterol (PROVENTIL) nebulizer solution 2.5 mg, 2.5 mg, Nebulization, Q4H PRN  amitriptyline (ELAVIL) tablet 10 mg, 10 mg, Oral, Nightly  amLODIPine (NORVASC) tablet 10 mg, 10 mg, Oral, Daily  vitamin B-12 (CYANOCOBALAMIN) tablet 250 mcg, 250 mcg, Oral, Daily  finasteride (PROSCAR) tablet 5 mg, 5 mg, Oral, Daily  mometasone-formoterol (DULERA) 100-5 MCG/ACT inhaler 2 puff, 2 puff, Inhalation, BID  perflutren lipid microspheres (DEFINITY) injection 1.65 mg, 1.5 mL, IntraVENous, ONCE PRN  [Held by provider] furosemide (LASIX) injection 20 mg, 20 mg, IntraVENous, Daily  enoxaparin Sodium (LOVENOX) injection 30 mg, 30 mg, SubCUTAneous, BID  pantoprazole (PROTONIX) tablet 40 mg, 40 mg, Oral, Daily  thiamine mononitrate tablet 100 mg, 100 mg, Oral, Daily  cefUROXime (CEFTIN) tablet 500 mg, 500 mg, Oral, 2 times per day    Physical      Vitals: BP (!) 152/67   Pulse 72   Temp 97.4 °F (36.3 °C) (Oral)   Resp 18   Ht 5' 10\" (1.778 m)   Wt 243 lb 8 oz (110.5 kg)   SpO2 92%   BMI 34.94 kg/m²   Temp: Temp: 97.4 °F (36.3 °C)  Max: Temp  Av.9 °F (36.6 °C)  Min: 97.4 °F (36.3 °C)  Max: 98.1 °F (36.7 °C)  Respiration range:  Resp  Av  Min: 18  Max: 22  Pulse Range:  Pulse  Av.3  Min: 69  Max: 78  Blood pressure range:  Systolic (78OBD), GVM:354 , Min:152 , BRQ:591   , Diastolic (15DCP), QKX:21, Min:67, Max:90    SpO2  Av.9 %  Min: 77 %  Max: 93 %    Intake/Output Summary (Last 24 hours) at 2022 1026  Last data filed at 2022 0908  Gross per 24 hour   Intake 1020 ml   Output 1650 ml   Net -630 ml       Vent settings:  Pulse  Av.7  Min: 63  Max: 120  Resp  Av.2  Min: 16  Max: 34  SpO2  Av.8 %  Min: 77 %  Max: 96 %    CONSTITUTIONAL:  fatigued, alert, cooperative, mild distress, appears younger than stated age, and morbidly obese  EYES:  unremarkable   NECK:  mild JVD  and supple, symmetrical, trachea midline  BACK:  symmetric and no curvature  LUNGS:  tachypneic, Moderate respiratory distress, moderate air exchange, no retractions, and crackles scattered , rhonchi scattered   CARDIOVASCULAR:  normal apical pulses, regular rate and rhythm, normal S1 and S2, and no S3  ABDOMEN:  soft non tender BS +   MUSCULOSKELETAL:  + edema   NEUROLOGIC:  grossly intact ,   SKIN:  warm and dry  and no bruising or bleeding    Data      Recent Results (from the past 96 hour(s))   CBC with Auto Differential    Collection Time: 22 11:39 AM   Result Value Ref Range    WBC 11.5 (H) 4.0 - 11.0 K/uL    RBC 4.69 4.20 - 5.90 M/uL    Hemoglobin 13.9 13.5 - 17.5 g/dL    Hematocrit 43.1 40.5 - 52.5 %    MCV 91.9 80.0 - 100.0 fL    MCH 29.5 26.0 - 34.0 pg    MCHC 32.2 31.0 - 36.0 g/dL    RDW 16.1 (H) 12.4 - 15.4 %    Platelets 369 352 - 699 K/uL    MPV 7.9 5.0 - 10.5 fL    Neutrophils % 86.9 %    Lymphocytes % 10.6 %    Monocytes % 2.1 %    Eosinophils % 0.3 %    Basophils % 0.1 %    Neutrophils Absolute 10.0 (H) 1.7 - 7.7 K/uL    Lymphocytes Absolute 1.2 1.0 - 5.1 K/uL    Monocytes Absolute 0.2 0.0 - 1.3 K/uL    Eosinophils Absolute 0.0 0.0 - 0.6 K/uL    Basophils Absolute 0.0 0.0 - 0.2 K/uL   Protime-INR    Collection Time: 07/29/22 11:39 AM   Result Value Ref Range    Protime 13.3 11.7 - 14.5 sec    INR 1.02 0.87 - 1.14   Culture, Blood 1    Collection Time: 07/29/22 11:39 AM    Specimen: Blood   Result Value Ref Range    Blood Culture, Routine       No Growth to date. Any change in status will be called. Lactate, Sepsis    Collection Time: 07/29/22 11:39 AM   Result Value Ref Range    Lactic Acid, Sepsis 2.4 (H) 0.4 - 1.9 mmol/L   EKG 12 Lead    Collection Time: 07/29/22 11:42 AM   Result Value Ref Range    Ventricular Rate 119 BPM    Atrial Rate 214 BPM    QRS Duration 102 ms    Q-T Interval 310 ms    QTc Calculation (Bazett) 436 ms    R Axis -46 degrees    T Axis 70 degrees    Diagnosis       Undetermined rhythmLeft anterior fascicular blockAbnormal ECGConfirmed by Wes Millan ([id number]) on 7/29/2022 1:09:24 PM   Culture, Blood 2    Collection Time: 07/29/22 11:49 AM    Specimen: Blood   Result Value Ref Range    Culture, Blood 2       No Growth to date. Any change in status will be called.    SPECIMEN REJECTION    Collection Time: 07/29/22 12:32 PM   Result Value Ref Range    Rejected Test bmp     Reason for Rejection see below    Basic Metabolic Panel    Collection Time: 07/29/22  2:10 PM   Result Value Ref Range    Sodium 144 136 - 145 mmol/L    Potassium 4.6 3.5 - 5.1 mmol/L    Chloride 103 99 - 110 mmol/L    CO2 30 21 - 32 mmol/L    Anion Gap 11 3 - 16    Glucose 158 (H) 70 - 99 mg/dL    BUN 17 7 - 20 mg/dL    Creatinine 1.1 0.8 - 1.3 mg/dL    GFR Non-African American >60 >60    GFR African American >60 >60    Calcium 9.3 8.3 - 10.6 mg/dL   Troponin    Collection Time: 07/29/22  2:10 PM   Result Value Ref Range    Troponin 0.01 <0.01 ng/mL   Brain Natriuretic Peptide    Collection Time: 07/29/22  2:10 PM   Result Value Ref Range    Pro- (H) 0 - 449 pg/mL   Blood gas, arterial    Collection Time: 07/29/22  5:05 PM   Result Value Ref Range    pH, Arterial 7.306 (L) 7.350 - 7.450    pCO2, Arterial 66.2 (H) 35.0 - 45.0 mmHg    pO2, Arterial 84.5 75.0 - 108.0 mmHg    HCO3, Arterial 33.0 (H) 21.0 - 29.0 mmol/L    Base Excess, Arterial 4.4 (H) -3.0 - 3.0 mmol/L    Hemoglobin, Art, Extended 14.1 13.5 - 17.5 g/dL    O2 Sat, Arterial 96.2 >92 %    Carboxyhgb, Arterial 1.2 0.0 - 1.5 %    Methemoglobin, Arterial 0.0 <1.5 %    TCO2, Arterial 78.4 Not Established mmol/L    O2 Therapy Unknown    Blood gas, venous    Collection Time: 07/29/22  6:50 PM   Result Value Ref Range    pH, Carlos 7.372 7.350 - 7.450    pCO2, Carlos 54.4 (H) 40.0 - 50.0 mmHg    pO2, Carlos 97.7 (H) 25.0 - 40.0 mmHg    HCO3, Venous 31.5 (H) 23.0 - 29.0 mmol/L    Base Excess, Carlos 4.7 (H) -3.0 - 3.0 mmol/L    O2 Sat, Carlos 98 Not Established %    Carboxyhemoglobin 2.8 (H) 0.0 - 1.5 %    MetHgb, Carlos 0.3 <1.5 %    TC02 (Calc), Carlos 74 Not Established mmol/L    O2 Content, Carlos 19 Not Established VOL %    O2 Therapy Unknown    Brain Natriuretic Peptide    Collection Time: 07/29/22  6:50 PM   Result Value Ref Range    Pro-BNP 1,176 (H) 0 - 449 pg/mL   Basic Metabolic Panel    Collection Time: 07/30/22  5:41 AM   Result Value Ref Range    Sodium 139 136 - 145 mmol/L    Potassium 5.1 3.5 - 5.1 mmol/L    Chloride 99 99 - 110 mmol/L    CO2 33 (H) 21 - 32 mmol/L    Anion Gap 7 3 - 16    Glucose 190 (H) 70 - 99 mg/dL    BUN 26 (H) 7 - 20 mg/dL    Creatinine 1.4 (H) 0.8 - 1.3 mg/dL    GFR Non- 47 (A) >60    GFR  American 57 (A) >60    Calcium 9.2 8.3 - 10.6 mg/dL   Basic Metabolic Panel    Collection Time: 07/31/22  4:51 AM   Result Value Ref Range    Sodium 142 136 - 145 mmol/L    Potassium 4.2 3.5 - 5.1 mmol/L    Chloride 100 99 - 110 mmol/L    CO2 36 (H) 21 - 32 mmol/L    Anion Gap 6 3 - 16    Glucose 133 (H) 70 - 99 mg/dL    BUN 36 (H) 7 - 20 mg/dL    Creatinine 1.2 0.8 - 1.3 mg/dL    GFR Non- 56 (A) >60    GFR African American >60 >60    Calcium 9.2 8.3 - 10.6 mg/dL   CBC    Collection Time: 08/02/22  9:07 AM   Result Value Ref Range    WBC 4.8 4.0 - 11.0 K/uL    RBC 4.50 4. 20 - 5.90 M/uL    Hemoglobin 13.5 13.5 - 17.5 g/dL    Hematocrit 41.4 40.5 - 52.5 %    MCV 91.9 80.0 - 100.0 fL    MCH 30.1 26.0 - 34.0 pg    MCHC 32.8 31.0 - 36.0 g/dL    RDW 16.0 (H) 12.4 - 15.4 %    Platelets 129 206 - 282 K/uL    MPV 7.9 5.0 - 10.5 fL   Renal Function Panel    Collection Time: 08/02/22  9:07 AM   Result Value Ref Range    Sodium 140 136 - 145 mmol/L    Potassium 4.0 3.5 - 5.1 mmol/L    Chloride 99 99 - 110 mmol/L    CO2 36 (H) 21 - 32 mmol/L    Anion Gap 5 3 - 16    Glucose 178 (H) 70 - 99 mg/dL    BUN 19 7 - 20 mg/dL    Creatinine 0.9 0.8 - 1.3 mg/dL    GFR Non-African American >60 >60    GFR African American >60 >60    Calcium 9.8 8.3 - 10.6 mg/dL    Phosphorus 3.4 2.5 - 4.9 mg/dL    Albumin 3.6 3.4 - 5.0 g/dL       ASSESSMENT AND PLAN     Hospital Problems             Last Modified POA    * (Principal) Acute combined systolic and diastolic CHF, NYHA class 1 ([address] 7/29/2022 Yes    Pneumonia 7/29/2022 Yes    Type 2 diabetes mellitus ([facility] 7/29/2022 Yes    Acute respiratory failure with hypoxia and hypercapnia ([facility] 7/29/2022 Yes    Debility 7/29/2022 Yes    Acute exacerbation of chronic obstructive pulmonary disease (COPD) ([facility] 7/29/2022 Yes    Hyperkalemia 7/30/2022 Yes    Peripheral neuropathy 7/30/2022 Yes    Esophageal reflux 7/30/2022 Yes    HTN (hypertension) 7/30/2022 Yes     PO Cefuroxime    SLP  eval and recommendation noted Dysphagia II diet soft bite sized , thin liquids no straws , meds crushed in puree  DVT prophylaxis   ECHO - LVEF 60 percent , moderate Aortic and Mitral regurgitation with moderate Pulm HTN

## 2022-08-02 NOTE — CARE COORDINATION
Discharge Planning. The patient will be discharging on 8/2/2022 to [facility]. The patient is active with [facility].  The SW called  informed [address] the patient will be discharging and the Nellie Brown faxed the Nellie Streeters [place] to  [phone]    Electronically signed by MEEK Huff on 8/2/2022 at 1:15 PM

## 2022-08-02 NOTE — PROGRESS NOTES
[facility]   Phone: [phone]    Occupational Therapy    [x] Initial Evaluation            [] Daily Treatment Note         [] Discharge Summary      Patient: Courtney Duong   : 1925   MRN: [id number]   Date of Service:  2022    Admitting Diagnosis:  Acute combined systolic and diastolic CHF, NYHA class 1 ([address]  Current Admission Summary: 79 yo female who came to the emergency room with history of shortness of  breath. The patient is a poor historian, did appear to be in  respiratory distress at [facility], but came via [address] The  patient appeared also somewhat shaky, also had temperature of 99.5. Rapid COVID negative. The patient had a pink frothy emesis. Systolic  blood pressure went as high as 200. The patient had pitting edema  bilaterally. The patient was put on CPAP. The patient needed  evaluation for acute severe dyspnea. The patient did not have any  angina pectoris. No abdominal pain. No hematemesis or melena. The  patient had moderate-to-severe increase in work of breathing on arrival  to the emergency room. Past Medical History:  has a past medical history of Aspiration pneumonia ([address] BPH, Dementia ([address] Elevated prostate specific antigen (PSA), Esophageal reflux, Fracture closed, humerus, HTN (hypertension), Hyperlipidemia, Osteoarthritis, Peripheral neuropathy, Peripheral neuropathy, and Type II or unspecified type diabetes mellitus without mention of complication, not stated as uncontrolled. Past Surgical History:  has a past surgical history that includes knee surgery (); Ankle surgery (); Tonsillectomy (); shoulder surgery (02/09/10); Colonoscopy (10-02); Cataract removal (10-03); Total knee arthroplasty (07); Revision total knee arthroplasty (09); Revision total knee arthroplasty (09); and Total knee arthroplasty ().     Discharge Recommendations: Courtney Duong scored a  on the AM-PAC ADL Inpatient form. Current research shows that an AM-PAC score of 18 or greater is typically associated with a discharge to the patient's home setting. Based on the patient's AM-PAC score, and their current ADL deficits, it is recommended that the patient have 2-3 sessions per week of Occupational Therapy at d/c to increase the patient's independence. At this time, this patient demonstrates the endurance and safety to discharge home with home based OT (home vs OP services) and a follow up treatment frequency of 2-3x/wk. Please see assessment section for further patient specific details. If patient discharges prior to next session this note will serve as a discharge summary. Please see below for the latest assessment towards goals. DME Required For Discharge: no DME required at discharge    Precautions/Restrictions: high fall risk  Weight Bearing Restrictions: no restrictions  [] Right Upper Extremity  [] Left Upper Extremity [] Right Lower Extremity  [] Left Lower Extremity     Required Braces/Orthotics: no braces required   [] Right  [] Left  Positional Restrictions:no positional restrictions    Pre-Admission Information   Social/Functional History  Lives With: Spouse  Type of Home: House (Independent living at [facility])  Home Layout: One level  Home Access: Level entry  Bathroom Shower/Tub: Walk-in shower  Bathroom Equipment: Grab bars in shower,Built-in shower seat,Hand-held shower  Home Equipment: Walker, rolling  Has the patient had two or more falls in the past year or any fall with injury in the past year?: No  Receives Help From: Home health  ADL Assistance: [address]: Needs assistance (has home assist in the morning for 4 hours - prepares breakfast and cleans up)  Homemaking Responsibilities: No  Ambulation Assistance:  Mod I with RW  Transfer Assistance: Independent  Active : Yes  Occupation: Retired  Type of Occupation: finance for [employer]  Leisure & Hobbies: watch TV, take walks  Additional Comments: Pt reports that his wife has gotten frail recently; pt has adult children who live locally     Examination   Vision:   Vision Gross Assessment: Impaired and Vision Corrective Device: wears glasses at all times  Hearing:   hard of hearing  Posture: Forward head; rounded shoulders  ROM:   (B) Shoulder AROM WFL  Strength:   (B) UE strength grossly WFL    Decision Making: low complexity  Clinical Presentation: stable      Subjective  General: Pt presenting upright sitting in chair upon arrival. He is pleasant and agreeable to PT/OT evaluations. He is on 3L O2 and saturating 93% at rest.   Pain: 0/10  Pain Interventions: not applicable        Activities of Daily Living  Basic Activities of Daily Living  Upper Extremity Bathing: setup assistance . Comment: assist to wash back in sitting. Lower Extremity Bathing: moderate assistance   Upper Extremity Dressing: setup assistance stand by assistance  Lower Extremity Dressing: moderate assistance . Comment: Mod A to thread pants and guanaco shoes in sitting. Pt Min A to pull pants from knees to hips in stance. Instrumental Activities of Daily Living  No IADL completed on this date. Functional Mobility  Bed Mobility  Bed mobility not completed on this date. Comments:  Transfers  Sit to stand transfer:contact guard assistance  Stand to sit transfer: contact guard assistance  Comments:  Functional Mobility:  Sitting Balance: supervision. Standing Balance: contact guard assistance, . Comment Pt stood for ~5-6 min during ambulation, transfers and ADL completion with RW for UE support during static/dynamic stance. Functional Mobility: .  contact guard assistance, . Comment pt ambulated 200ft with 3L portable O2 and RW. Pt with x3 instances of minor LOB and difficulty with walker navigation; however, he is able to self-correct.  Pt desaturated to 88% on 3L; however recovers to 92-94% within 1-2 min at rest.     Other Therapeutic Interventions    Functional Outcomes  -PAC Inpatient Daily Activity Raw Score: 17    Cognition  Overall Cognitive Status: Impaired  Following Commands: follows all commands without difficulty  Attention Span: appears intact  Safety Judgement: good awareness of safety precautions  Problem Solving: assistance required to generate solutions, assistance required to implement solutions, assistance required to identify errors made  Insights: fully aware of deficits  Initiation: does not require cues  Sequencing: does not require cues  Orientation:    oriented to person, oriented to place, and oriented to situation  Command Following:   [facility]     Education  Barriers To Learning: cognition  Patient Education: patient educated on goals, OT role and benefits, plan of care, precautions, HEP, transfer training, discharge recommendations  Learning Assessment:  patient verbalizes understanding, would benefit from continued reinforcement    Assessment  Activity Tolerance: Pt tolerated treatment well. Impairments Requiring Therapeutic Intervention: decreased functional mobility, decreased ADL status, decreased strength, decreased safety awareness, decreased cognition, decreased endurance, decreased sensation, decreased balance  Prognosis: good  Clinical Assessment: Pt presenting slightly below his functional baseline with the above deficits associated with CHF exacerbation. He is primarily limited by decreased balance and unsteadiness on his feet. However, pt has access to substantial care and supervision at home from multiple aide staff at [facility]. States he and spouse plan on transitioning to assisted living long term. Anticipate safe and successful return to OF with increased activity. Continued OT indicated in order to maximize safety and independence with all occupational pursuits.    Safety Interventions: patient left in chair, chair alarm in place, call light within reach, patient at risk for falls, and nurse notified    Plan  Frequency: 3-5 x/per week  Current Treatment Recommendations: strengthening, balance training, functional mobility training, transfer training, gait training, endurance training, ADL/self-care training, home management training, and safety education    Goals  Patient Goals: Return to home   Short Term Goals:  Time Frame: Discharge  Patient will complete upper body ADL at [facility]   Patient will complete lower body ADL at [facility]   Patient will complete toileting at modified independent   Patient will complete grooming at [facility]   Patient will complete functional transfers at [facility]     Therapy Session Time     Individual Group Co-treatment   Time In    1154   Time Out    1232   Minutes    38        Timed Code Treatment Minutes:  23 minutes   Total Treatment Minutes:  38 minutes       Electronically Signed By: DESMOND Dangelo OTR/L  [id number]

## 2022-08-03 ENCOUNTER — TELEPHONE (OUTPATIENT)
Dept: OTHER | Age: 87
End: 2022-08-03

## 2022-08-03 NOTE — PROGRESS NOTES
Physician Progress Note      Tresa Sams  CSN #:                  [id number]  :                       1925  ADMIT DATE:       2022 11:29 AM  DISCH DATE:        2022 1:26 PM  RESPONDING  PROVIDER #:        Dayo Rai MD          QUERY TEXT:    Pt admitted with Acute CHF. Pt noted to have abnormal labs and vitals. If   possible, please document in the progress notes and discharge summary if you   are evaluating and /or treating any of the following: The medical record reflects the following:  Risk Factors: PNA  Clinical Indicators: WBC- 11.5, LS- 2.4. HR- 120, Resp- 29. Treatment: Labs, Ceftin  Options provided:  -- Sepsis, present on admission  -- pneumonia without Sepsis  -- Sepsis was ruled out  -- Other - I will add my own diagnosis  -- Disagree - Not applicable / Not valid  -- Disagree - Clinically unable to determine / Unknown  -- Refer to Clinical Documentation Reviewer    PROVIDER RESPONSE TEXT:    After further study, sepsis was ruled out for this patient.     Query created by: Charly Reynolds on 2022 2:45 PM      Electronically signed by:  Dayo Rai MD 8/3/2022 1:53 PM

## 2022-08-03 NOTE — TELEPHONE ENCOUNTER
[address] FAILURE PROGRAM  TELEPHONE ENCOUNTER FORM    Jr Marinelli 12/22/1925    Attempted to call patient for HF follow-up. No answer at this time.       Next MD/ Clinic appointment: 8/8 with pcp      EUGENIE FREDERICK RN 8/3/2022 11:47 AM

## 2022-08-04 ENCOUNTER — TELEPHONE (OUTPATIENT)
Dept: OTHER | Age: 87
End: 2022-08-04

## 2022-08-05 ENCOUNTER — TELEPHONE (OUTPATIENT)
Dept: OTHER | Age: 87
End: 2022-08-05

## 2022-09-08 NOTE — PROGRESS NOTES
Patient seen , discharge dictated scripts given , arrangements made , RONY completed .  Discussed with nursing staff  And   If applicable ,  Discussed with  Patient's family , all questions answered and concerns addressed  When applicable

## 2022-09-08 NOTE — DISCHARGE SUMMARY
[address]                     [address] [facility], [facility]                               DISCHARGE SUMMARY    PATIENT NAME: Tomas Burns                     :        1925  MED REC NO:   [id number]                          ROOM:       9504  ACCOUNT NO:   [de-identified]                           ADMIT DATE: 2022  PROVIDER:     Clark Pettit MD                  DISCHARGE DATE:  2022    FINAL DIAGNOSES:  1. Acute-on-chronic combined heart failure. 2.  Pneumonia. 3.  Morbid obesity. 4.  Hypertension. DISCHARGE MEDICATIONS:  1. Losartan 50 mg once a day. 2.  Ceftin 500 mg p.o. b.i.d.  3.  Miralax powder 17 gm once a day. 4.  Senokot-S two tablets daily. 5.  Lasix 20 mg every other day. 6.  Doxazosin 1 mg nightly. 7.  Advair two puffs twice a day. 8.  Losartan 100 mg once a day. 9.  Amitriptyline 25 mg at dinner. 10.  Amlodipine 10 mg once a day. 11.  Proventil nebulized aerosol one every four hours p.r.n.  12.  Proscar 5 mg at bedtime. 13.  Cyanocobalamin 250 mcg tablets daily. 14.  Gabapentin 300 mg in the morning and in the evening. 15.  Pantoprazole 20 mg in the morning and at bedtime. 16.  Thiamine 100 mg once a day. HOSPITAL COURSE:  This 68-year-old obese white man came to the emergency  room with increased shortness of breath, poor historian, but he was in  moderate respiratory distress. Blood pressure went as high as 200. The  patient was put on CPAP. The patient was hypoxemic initially, was found  to have radiographic congestive heart failure and pneumonia. Sodium was 144, potassium 4.6, chloride 103, CO2 of 30, BUN 17,  creatinine 1.1. ABG shows pH of 7.30, pCO2 of 66.2, pO2 of 84.5,  bicarbonate 33. EKG, normal sinus rhythm. Chest x-ray shows vascular  congestion, perihilar airspace disease. Treated with IV Lasix, DuoNeb,  and empiric antibiotic treatment. The patient is a limited code.   After  3 to 4 days of treatment, general condition stabilized. The patient was  given PT and OT evaluation and supplemental oxygen as needed. Transthoracic echocardiogram was ordered, which was done on 08/02/2022  after dismissal.  LV ejection fraction was 60%, moderate concentric left  ventricular hypertrophy, reveals a _____ flow velocity across the mitral  valve, moderate-to-severe mitral regurgitation. The left trium was  normal in size. Mild-to-moderate aortic regurgitation also present. After 3 to 4 days of treatment, general condition stabilized. The  patient was discharged on above medications in stable condition.         Stephanie Burks MD    D: 09/07/2022 03:86:67       T: 09/08/2022 4:31:05     [email or web address]  Job#: [id number]     Doc#: [id number]    CC:

## 2023-09-07 NOTE — PROGRESS NOTES
Patient voided 250ml in toilet.  Will continue to monitor Provider Procedure Text (A): After obtaining clear surgical margins the defect was repaired by another provider.

## 2023-11-07 ENCOUNTER — APPOINTMENT (OUTPATIENT)
Dept: GENERAL RADIOLOGY | Age: 88
End: 2023-11-07
Payer: MEDICARE

## 2023-11-07 ENCOUNTER — HOSPITAL ENCOUNTER (INPATIENT)
Age: 88
LOS: 3 days | Discharge: HOME | End: 2023-11-10
Attending: EMERGENCY MEDICINE | Admitting: INTERNAL MEDICINE
Payer: MEDICARE

## 2023-11-07 DIAGNOSIS — R09.02 HYPOXEMIA: ICD-10-CM

## 2023-11-07 DIAGNOSIS — R29.6 FREQUENT FALLS: ICD-10-CM

## 2023-11-07 DIAGNOSIS — J18.9 PNEUMONIA DUE TO INFECTIOUS ORGANISM, UNSPECIFIED LATERALITY, UNSPECIFIED PART OF LUNG: Primary | ICD-10-CM

## 2023-11-07 PROBLEM — J15.9 PNEUMONIA, BACTERIAL: Status: ACTIVE | Noted: 2023-11-07

## 2023-11-07 PROBLEM — A41.9 SEPSIS (HCC): Status: ACTIVE | Noted: 2023-11-07

## 2023-11-07 LAB
ALBUMIN SERPL-MCNC: 4 G/DL (ref 3.4–5)
ALBUMIN/GLOB SERPL: 1.3 {RATIO} (ref 1.1–2.2)
ALP SERPL-CCNC: 68 U/L (ref 40–129)
ALT SERPL-CCNC: 17 U/L (ref 10–40)
ANION GAP SERPL CALCULATED.3IONS-SCNC: 12 MMOL/L (ref 3–16)
AST SERPL-CCNC: 23 U/L (ref 15–37)
BASE EXCESS BLDV CALC-SCNC: 3.1 MMOL/L (ref -3–3)
BASOPHILS # BLD: 0 K/UL (ref 0–0.2)
BASOPHILS NFR BLD: 0.3 %
BILIRUB SERPL-MCNC: 0.6 MG/DL (ref 0–1)
BUN SERPL-MCNC: 21 MG/DL (ref 7–20)
CALCIUM SERPL-MCNC: 9.1 MG/DL (ref 8.3–10.6)
CHLORIDE SERPL-SCNC: 102 MMOL/L (ref 99–110)
CO2 BLDV-SCNC: 65 MMOL/L
CO2 SERPL-SCNC: 25 MMOL/L (ref 21–32)
COHGB MFR BLDV: 4 % (ref 0–1.5)
CREAT SERPL-MCNC: 1.3 MG/DL (ref 0.8–1.3)
DEPRECATED RDW RBC AUTO: 14.6 % (ref 12.4–15.4)
EKG ATRIAL RATE: 96 BPM
EKG DIAGNOSIS: NORMAL
EKG Q-T INTERVAL: 362 MS
EKG QRS DURATION: 128 MS
EKG QTC CALCULATION (BAZETT): 457 MS
EKG R AXIS: -40 DEGREES
EKG T AXIS: 30 DEGREES
EKG VENTRICULAR RATE: 96 BPM
EOSINOPHIL # BLD: 0 K/UL (ref 0–0.6)
EOSINOPHIL NFR BLD: 0.1 %
FLUAV RNA RESP QL NAA+PROBE: NOT DETECTED
FLUBV RNA RESP QL NAA+PROBE: NOT DETECTED
GFR SERPLBLD CREATININE-BSD FMLA CKD-EPI: 50 ML/MIN/{1.73_M2}
GLUCOSE BLD-MCNC: 174 MG/DL (ref 70–99)
GLUCOSE SERPL-MCNC: 148 MG/DL (ref 70–99)
HCO3 BLDV-SCNC: 27.7 MMOL/L (ref 23–29)
HCT VFR BLD AUTO: 43.5 % (ref 40.5–52.5)
HGB BLD-MCNC: 14.1 G/DL (ref 13.5–17.5)
LACTATE BLDV-SCNC: 2.2 MMOL/L (ref 0.4–2)
LYMPHOCYTES # BLD: 0.8 K/UL (ref 1–5.1)
LYMPHOCYTES NFR BLD: 7.1 %
MCH RBC QN AUTO: 30.5 PG (ref 26–34)
MCHC RBC AUTO-ENTMCNC: 32.4 G/DL (ref 31–36)
MCV RBC AUTO: 93.9 FL (ref 80–100)
METHGB MFR BLDV: 0.1 %
MONOCYTES # BLD: 0.6 K/UL (ref 0–1.3)
MONOCYTES NFR BLD: 4.8 %
NEUTROPHILS # BLD: 10.2 K/UL (ref 1.7–7.7)
NEUTROPHILS NFR BLD: 87.7 %
NT-PROBNP SERPL-MCNC: 230 PG/ML (ref 0–449)
O2 CT VFR BLDV CALC: 20 VOL %
O2 THERAPY: ABNORMAL
PCO2 BLDV: 41.4 MMHG (ref 40–50)
PERFORMED ON: ABNORMAL
PH BLDV: 7.43 [PH] (ref 7.35–7.45)
PLATELET # BLD AUTO: 165 K/UL (ref 135–450)
PMV BLD AUTO: 8.1 FL (ref 5–10.5)
PO2 BLDV: 182 MMHG (ref 25–40)
POTASSIUM SERPL-SCNC: 4.5 MMOL/L (ref 3.5–5.1)
PROT SERPL-MCNC: 7.1 G/DL (ref 6.4–8.2)
RBC # BLD AUTO: 4.64 M/UL (ref 4.2–5.9)
SAO2 % BLDV: 100 %
SARS-COV-2 RNA RESP QL NAA+PROBE: NOT DETECTED
SODIUM SERPL-SCNC: 139 MMOL/L (ref 136–145)
TROPONIN, HIGH SENSITIVITY: 47 NG/L (ref 0–22)
TROPONIN, HIGH SENSITIVITY: 50 NG/L (ref 0–22)
WBC # BLD AUTO: 11.6 K/UL (ref 4–11)

## 2023-11-07 PROCEDURE — 93005 ELECTROCARDIOGRAM TRACING: CPT | Performed by: EMERGENCY MEDICINE

## 2023-11-07 PROCEDURE — 6370000000 HC RX 637 (ALT 250 FOR IP): Performed by: INTERNAL MEDICINE

## 2023-11-07 PROCEDURE — 82803 BLOOD GASES ANY COMBINATION: CPT

## 2023-11-07 PROCEDURE — 85025 COMPLETE CBC W/AUTO DIFF WBC: CPT

## 2023-11-07 PROCEDURE — 2700000000 HC OXYGEN THERAPY PER DAY

## 2023-11-07 PROCEDURE — 94640 AIRWAY INHALATION TREATMENT: CPT

## 2023-11-07 PROCEDURE — 6360000002 HC RX W HCPCS: Performed by: EMERGENCY MEDICINE

## 2023-11-07 PROCEDURE — 83880 ASSAY OF NATRIURETIC PEPTIDE: CPT

## 2023-11-07 PROCEDURE — 6370000000 HC RX 637 (ALT 250 FOR IP): Performed by: EMERGENCY MEDICINE

## 2023-11-07 PROCEDURE — 83036 HEMOGLOBIN GLYCOSYLATED A1C: CPT

## 2023-11-07 PROCEDURE — 2580000003 HC RX 258: Performed by: INTERNAL MEDICINE

## 2023-11-07 PROCEDURE — 93010 ELECTROCARDIOGRAM REPORT: CPT | Performed by: INTERNAL MEDICINE

## 2023-11-07 PROCEDURE — 94760 N-INVAS EAR/PLS OXIMETRY 1: CPT

## 2023-11-07 PROCEDURE — 36415 COLL VENOUS BLD VENIPUNCTURE: CPT

## 2023-11-07 PROCEDURE — 71045 X-RAY EXAM CHEST 1 VIEW: CPT

## 2023-11-07 PROCEDURE — 6360000002 HC RX W HCPCS: Performed by: INTERNAL MEDICINE

## 2023-11-07 PROCEDURE — 2580000003 HC RX 258: Performed by: EMERGENCY MEDICINE

## 2023-11-07 PROCEDURE — 99285 EMERGENCY DEPT VISIT HI MDM: CPT

## 2023-11-07 PROCEDURE — 83605 ASSAY OF LACTIC ACID: CPT

## 2023-11-07 PROCEDURE — 80053 COMPREHEN METABOLIC PANEL: CPT

## 2023-11-07 PROCEDURE — 1200000000 HC SEMI PRIVATE

## 2023-11-07 PROCEDURE — 84484 ASSAY OF TROPONIN QUANT: CPT

## 2023-11-07 PROCEDURE — 87636 SARSCOV2 & INF A&B AMP PRB: CPT

## 2023-11-07 RX ORDER — IPRATROPIUM BROMIDE AND ALBUTEROL SULFATE 2.5; .5 MG/3ML; MG/3ML
1 SOLUTION RESPIRATORY (INHALATION) ONCE
Status: COMPLETED | OUTPATIENT
Start: 2023-11-07 | End: 2023-11-07

## 2023-11-07 RX ORDER — SODIUM CHLORIDE 9 MG/ML
INJECTION, SOLUTION INTRAVENOUS PRN
Status: DISCONTINUED | OUTPATIENT
Start: 2023-11-07 | End: 2023-11-10 | Stop reason: HOSPADM

## 2023-11-07 RX ORDER — ALBUTEROL SULFATE 2.5 MG/3ML
2.5 SOLUTION RESPIRATORY (INHALATION) EVERY 4 HOURS PRN
Status: DISCONTINUED | OUTPATIENT
Start: 2023-11-07 | End: 2023-11-10 | Stop reason: HOSPADM

## 2023-11-07 RX ORDER — SODIUM CHLORIDE 0.9 % (FLUSH) 0.9 %
5-40 SYRINGE (ML) INJECTION EVERY 12 HOURS SCHEDULED
Status: DISCONTINUED | OUTPATIENT
Start: 2023-11-07 | End: 2023-11-10 | Stop reason: HOSPADM

## 2023-11-07 RX ORDER — FLUTICASONE FUROATE AND VILANTEROL 100; 25 UG/1; UG/1
1 POWDER RESPIRATORY (INHALATION) DAILY
Status: DISCONTINUED | OUTPATIENT
Start: 2023-11-07 | End: 2023-11-07 | Stop reason: CLARIF

## 2023-11-07 RX ORDER — CHOLECALCIFEROL (VITAMIN D3) 125 MCG
2000 CAPSULE ORAL DAILY
COMMUNITY

## 2023-11-07 RX ORDER — IPRATROPIUM BROMIDE AND ALBUTEROL SULFATE 2.5; .5 MG/3ML; MG/3ML
1 SOLUTION RESPIRATORY (INHALATION)
Status: DISCONTINUED | OUTPATIENT
Start: 2023-11-07 | End: 2023-11-10 | Stop reason: HOSPADM

## 2023-11-07 RX ORDER — ONDANSETRON 4 MG/1
4 TABLET, ORALLY DISINTEGRATING ORAL EVERY 8 HOURS PRN
Status: DISCONTINUED | OUTPATIENT
Start: 2023-11-07 | End: 2023-11-10 | Stop reason: HOSPADM

## 2023-11-07 RX ORDER — CYANOCOBALAMIN (VITAMIN B-12) 250 MCG
250 TABLET ORAL DAILY
Status: DISCONTINUED | OUTPATIENT
Start: 2023-11-07 | End: 2023-11-07

## 2023-11-07 RX ORDER — AZITHROMYCIN 500 MG/1
500 INJECTION, POWDER, LYOPHILIZED, FOR SOLUTION INTRAVENOUS ONCE
Status: DISCONTINUED | OUTPATIENT
Start: 2023-11-07 | End: 2023-11-07 | Stop reason: SDUPTHER

## 2023-11-07 RX ORDER — 0.9 % SODIUM CHLORIDE 0.9 %
500 INTRAVENOUS SOLUTION INTRAVENOUS PRN
Status: DISCONTINUED | OUTPATIENT
Start: 2023-11-07 | End: 2023-11-10 | Stop reason: HOSPADM

## 2023-11-07 RX ORDER — AMLODIPINE BESYLATE 5 MG/1
5 TABLET ORAL DAILY
COMMUNITY

## 2023-11-07 RX ORDER — AMITRIPTYLINE HYDROCHLORIDE 25 MG/1
25 TABLET, FILM COATED ORAL NIGHTLY
Status: DISCONTINUED | OUTPATIENT
Start: 2023-11-07 | End: 2023-11-10 | Stop reason: HOSPADM

## 2023-11-07 RX ORDER — INSULIN LISPRO 100 [IU]/ML
0-4 INJECTION, SOLUTION INTRAVENOUS; SUBCUTANEOUS NIGHTLY
Status: DISCONTINUED | OUTPATIENT
Start: 2023-11-07 | End: 2023-11-10 | Stop reason: HOSPADM

## 2023-11-07 RX ORDER — FINASTERIDE 5 MG/1
5 TABLET, FILM COATED ORAL DAILY
Status: DISCONTINUED | OUTPATIENT
Start: 2023-11-08 | End: 2023-11-10 | Stop reason: HOSPADM

## 2023-11-07 RX ORDER — SENNA AND DOCUSATE SODIUM 50; 8.6 MG/1; MG/1
2 TABLET, FILM COATED ORAL DAILY
Status: DISCONTINUED | OUTPATIENT
Start: 2023-11-08 | End: 2023-11-10 | Stop reason: HOSPADM

## 2023-11-07 RX ORDER — ACETAMINOPHEN 650 MG/1
650 SUPPOSITORY RECTAL EVERY 6 HOURS PRN
Status: DISCONTINUED | OUTPATIENT
Start: 2023-11-07 | End: 2023-11-10 | Stop reason: HOSPADM

## 2023-11-07 RX ORDER — ASCORBIC ACID 500 MG
500 TABLET ORAL DAILY
COMMUNITY

## 2023-11-07 RX ORDER — METHOCARBAMOL 500 MG/1
500 TABLET, FILM COATED ORAL 4 TIMES DAILY
Status: DISCONTINUED | OUTPATIENT
Start: 2023-11-07 | End: 2023-11-10 | Stop reason: HOSPADM

## 2023-11-07 RX ORDER — ONDANSETRON 2 MG/ML
4 INJECTION INTRAMUSCULAR; INTRAVENOUS EVERY 6 HOURS PRN
Status: DISCONTINUED | OUTPATIENT
Start: 2023-11-07 | End: 2023-11-10 | Stop reason: HOSPADM

## 2023-11-07 RX ORDER — LOSARTAN POTASSIUM 100 MG/1
50 TABLET ORAL DAILY
Status: DISCONTINUED | OUTPATIENT
Start: 2023-11-07 | End: 2023-11-09

## 2023-11-07 RX ORDER — PANTOPRAZOLE SODIUM 40 MG/1
40 TABLET, DELAYED RELEASE ORAL
Status: DISCONTINUED | OUTPATIENT
Start: 2023-11-07 | End: 2023-11-10 | Stop reason: HOSPADM

## 2023-11-07 RX ORDER — GABAPENTIN 300 MG/1
300 CAPSULE ORAL 4 TIMES DAILY
Status: DISCONTINUED | OUTPATIENT
Start: 2023-11-07 | End: 2023-11-10 | Stop reason: HOSPADM

## 2023-11-07 RX ORDER — HYDRALAZINE HYDROCHLORIDE 20 MG/ML
10 INJECTION INTRAMUSCULAR; INTRAVENOUS EVERY 6 HOURS PRN
Status: DISCONTINUED | OUTPATIENT
Start: 2023-11-07 | End: 2023-11-10 | Stop reason: HOSPADM

## 2023-11-07 RX ORDER — FLUTICASONE FUROATE AND VILANTEROL 100; 25 UG/1; UG/1
1 POWDER RESPIRATORY (INHALATION) DAILY
COMMUNITY

## 2023-11-07 RX ORDER — AMLODIPINE BESYLATE 5 MG/1
10 TABLET ORAL DAILY
Status: DISCONTINUED | OUTPATIENT
Start: 2023-11-07 | End: 2023-11-07

## 2023-11-07 RX ORDER — GAUZE BANDAGE 2" X 2"
100 BANDAGE TOPICAL DAILY
Status: DISCONTINUED | OUTPATIENT
Start: 2023-11-07 | End: 2023-11-07

## 2023-11-07 RX ORDER — POTASSIUM CHLORIDE 7.45 MG/ML
10 INJECTION INTRAVENOUS PRN
Status: DISCONTINUED | OUTPATIENT
Start: 2023-11-07 | End: 2023-11-10 | Stop reason: HOSPADM

## 2023-11-07 RX ORDER — METHOCARBAMOL 500 MG/1
500 TABLET, FILM COATED ORAL EVERY 6 HOURS PRN
COMMUNITY

## 2023-11-07 RX ORDER — POTASSIUM CHLORIDE 20 MEQ/1
40 TABLET, EXTENDED RELEASE ORAL PRN
Status: DISCONTINUED | OUTPATIENT
Start: 2023-11-07 | End: 2023-11-10 | Stop reason: HOSPADM

## 2023-11-07 RX ORDER — SODIUM CHLORIDE 9 MG/ML
INJECTION, SOLUTION INTRAVENOUS CONTINUOUS
Status: DISCONTINUED | OUTPATIENT
Start: 2023-11-07 | End: 2023-11-09

## 2023-11-07 RX ORDER — ENOXAPARIN SODIUM 100 MG/ML
30 INJECTION SUBCUTANEOUS 2 TIMES DAILY
Status: DISCONTINUED | OUTPATIENT
Start: 2023-11-07 | End: 2023-11-10 | Stop reason: HOSPADM

## 2023-11-07 RX ORDER — DOXAZOSIN MESYLATE 4 MG/1
4 TABLET ORAL NIGHTLY
COMMUNITY

## 2023-11-07 RX ORDER — INSULIN LISPRO 100 [IU]/ML
0-8 INJECTION, SOLUTION INTRAVENOUS; SUBCUTANEOUS
Status: DISCONTINUED | OUTPATIENT
Start: 2023-11-07 | End: 2023-11-10 | Stop reason: HOSPADM

## 2023-11-07 RX ORDER — AZITHROMYCIN 250 MG/1
500 TABLET, FILM COATED ORAL EVERY 24 HOURS
Status: DISCONTINUED | OUTPATIENT
Start: 2023-11-08 | End: 2023-11-09

## 2023-11-07 RX ORDER — ACETAMINOPHEN 325 MG/1
650 TABLET ORAL EVERY 6 HOURS PRN
Status: DISCONTINUED | OUTPATIENT
Start: 2023-11-07 | End: 2023-11-10 | Stop reason: HOSPADM

## 2023-11-07 RX ORDER — AMOXICILLIN 500 MG/1
2000 TABLET, FILM COATED ORAL AS NEEDED
Status: ON HOLD | COMMUNITY
End: 2023-11-09 | Stop reason: HOSPADM

## 2023-11-07 RX ORDER — SODIUM CHLORIDE 0.9 % (FLUSH) 0.9 %
10 SYRINGE (ML) INJECTION PRN
Status: DISCONTINUED | OUTPATIENT
Start: 2023-11-07 | End: 2023-11-10 | Stop reason: HOSPADM

## 2023-11-07 RX ORDER — DEXTROSE MONOHYDRATE 100 MG/ML
INJECTION, SOLUTION INTRAVENOUS CONTINUOUS PRN
Status: DISCONTINUED | OUTPATIENT
Start: 2023-11-07 | End: 2023-11-10 | Stop reason: HOSPADM

## 2023-11-07 RX ORDER — GLUCAGON 1 MG/ML
1 KIT INJECTION PRN
Status: DISCONTINUED | OUTPATIENT
Start: 2023-11-07 | End: 2023-11-10 | Stop reason: HOSPADM

## 2023-11-07 RX ORDER — VITAMIN B COMPLEX
1 CAPSULE ORAL DAILY
COMMUNITY

## 2023-11-07 RX ADMIN — GABAPENTIN 300 MG: 300 CAPSULE ORAL at 20:01

## 2023-11-07 RX ADMIN — AZITHROMYCIN DIHYDRATE 500 MG: 250 TABLET ORAL at 23:39

## 2023-11-07 RX ADMIN — PANTOPRAZOLE SODIUM 40 MG: 40 TABLET, DELAYED RELEASE ORAL at 17:04

## 2023-11-07 RX ADMIN — IPRATROPIUM BROMIDE AND ALBUTEROL SULFATE 1 DOSE: 2.5; .5 SOLUTION RESPIRATORY (INHALATION) at 21:11

## 2023-11-07 RX ADMIN — ENOXAPARIN SODIUM 30 MG: 100 INJECTION SUBCUTANEOUS at 20:02

## 2023-11-07 RX ADMIN — METHOCARBAMOL TABLETS 500 MG: 500 TABLET, COATED ORAL at 20:01

## 2023-11-07 RX ADMIN — IPRATROPIUM BROMIDE AND ALBUTEROL SULFATE 1 DOSE: .5; 3 SOLUTION RESPIRATORY (INHALATION) at 10:49

## 2023-11-07 RX ADMIN — ACETAMINOPHEN 650 MG: 325 TABLET ORAL at 17:04

## 2023-11-07 RX ADMIN — METHOCARBAMOL TABLETS 500 MG: 500 TABLET, COATED ORAL at 17:04

## 2023-11-07 RX ADMIN — AMITRIPTYLINE HYDROCHLORIDE 25 MG: 25 TABLET, FILM COATED ORAL at 20:02

## 2023-11-07 RX ADMIN — LOSARTAN POTASSIUM 50 MG: 100 TABLET, FILM COATED ORAL at 17:04

## 2023-11-07 RX ADMIN — CEFTRIAXONE SODIUM 1000 MG: 1 INJECTION, POWDER, FOR SOLUTION INTRAMUSCULAR; INTRAVENOUS at 13:07

## 2023-11-07 RX ADMIN — AZITHROMYCIN MONOHYDRATE 500 MG: 500 INJECTION, POWDER, LYOPHILIZED, FOR SOLUTION INTRAVENOUS at 13:42

## 2023-11-07 RX ADMIN — SODIUM CHLORIDE: 9 INJECTION, SOLUTION INTRAVENOUS at 18:11

## 2023-11-07 RX ADMIN — GABAPENTIN 300 MG: 300 CAPSULE ORAL at 17:04

## 2023-11-07 ASSESSMENT — PATIENT HEALTH QUESTIONNAIRE - PHQ9
SUM OF ALL RESPONSES TO PHQ QUESTIONS 1-9: 0
1. LITTLE INTEREST OR PLEASURE IN DOING THINGS: 0
SUM OF ALL RESPONSES TO PHQ QUESTIONS 1-9: 0
SUM OF ALL RESPONSES TO PHQ QUESTIONS 1-9: 0
2. FEELING DOWN, DEPRESSED OR HOPELESS: 0
SUM OF ALL RESPONSES TO PHQ9 QUESTIONS 1 & 2: 0
SUM OF ALL RESPONSES TO PHQ QUESTIONS 1-9: 0

## 2023-11-07 ASSESSMENT — PAIN DESCRIPTION - ONSET: ONSET: ON-GOING

## 2023-11-07 ASSESSMENT — PAIN DESCRIPTION - LOCATION: LOCATION: SHOULDER

## 2023-11-07 ASSESSMENT — LIFESTYLE VARIABLES
HOW OFTEN DO YOU HAVE A DRINK CONTAINING ALCOHOL: NEVER
HOW MANY STANDARD DRINKS CONTAINING ALCOHOL DO YOU HAVE ON A TYPICAL DAY: PATIENT DOES NOT DRINK

## 2023-11-07 ASSESSMENT — PAIN DESCRIPTION - FREQUENCY: FREQUENCY: INTERMITTENT

## 2023-11-07 ASSESSMENT — PAIN DESCRIPTION - PAIN TYPE: TYPE: CHRONIC PAIN

## 2023-11-07 ASSESSMENT — PAIN SCALES - GENERAL
PAINLEVEL_OUTOF10: 0
PAINLEVEL_OUTOF10: 8

## 2023-11-07 ASSESSMENT — PAIN DESCRIPTION - DESCRIPTORS: DESCRIPTORS: ACHING

## 2023-11-07 ASSESSMENT — PAIN DESCRIPTION - ORIENTATION: ORIENTATION: LEFT

## 2023-11-07 NOTE — PROGRESS NOTES
Pharmacy Home Medication Reconciliation Note    A medication reconciliation has been completed for Sam Castorena 12/22/1925    Pharmacy: [facility] [id number]  [facility] [address] [place] [address], [address] [place] [address] provided by: facility list and nurse Delia Bettencourt    The patient's home medication list is as follows: No current facility-administered medications on file prior to encounter. Current Outpatient Medications on File Prior to Encounter   Medication Sig Dispense Refill    amoxicillin (AMOXIL) 500 MG tablet Take 4 tablets by mouth as needed (Dental procedures.) Give 4 tablets by mouth as needed: 1 [address] fluticasone furoate-vilanterol (BREO ELLIPTA) 100-25 MCG/ACT inhaler Inhale 1 puff into the lungs daily Indications: Chronic respiratory failure. methocarbamol (ROBAXIN) 500 MG tablet Take 1 tablet by mouth every 6 hours as needed (Pain related to polyneuropathy.)      amLODIPine (NORVASC) 5 MG tablet Take 1 tablet by mouth daily      doxazosin (CARDURA) 4 MG tablet Take 1 tablet by mouth nightly      b complex vitamins capsule Take 1 capsule by mouth daily      vitamin C (ASCORBIC ACID) 500 MG tablet Take 1 tablet by mouth daily      Cholecalciferol (VITAMIN D3) 50 MCG (2000 UT) TABS Take 1 tablet by mouth daily      losartan (COZAAR) 50 MG tablet Take 1 tablet by mouth in the morning. (Patient taking differently: Take 1 tablet by mouth Daily with lunch) 30 tablet 3    sennosides-docusate sodium (SENOKOT-S) 8.6-50 MG tablet Take 2 tablets by mouth in the morning. furosemide (LASIX) 20 MG tablet Take 1 tablet by mouth every other day (Patient not taking: Reported on 11/7/2023) 60 tablet 3    fluticasone-salmeterol (ADVAIR DISKUS) 250-50 MCG/ACT AEPB diskus inhaler Inhale 1 puff into the lungs every 12 hours (Patient not taking: Reported on 11/7/2023) 60 each 3    amitriptyline (ELAVIL) 10 mg tablet Take 1 tablet by mouth 2 times daily Indications: Major depressive disorder.

## 2023-11-07 NOTE — PROGRESS NOTES
Pt admitted for pna, resp failure      Full h+p to follow    Active Hospital Problems    Diagnosis Date Noted    Acute respiratory failure with hypoxia and hypercapnia (HCC) [J96.01, J96.02] 04/26/2022     Priority: Medium    Pneumonia [J18.9] 04/21/2022     Priority: Medium    Type 2 diabetes mellitus ([address]) [E11.9] 04/21/2022     Priority: Medium    Sepsis ([address]) [A41.9] 11/07/2023    HTN (hypertension) [I10]        Please use [employer] to contact me with any questions during the day. The hospitalist service will provide cross-coverage for this patient from 7pm to 7am.    During those hours, contact the on-call hospitalist MD/EVELYN for questions.

## 2023-11-07 NOTE — ED PROVIDER NOTES
EMERGENCY MEDICINE ATTENDING NOTE  Elisabeth Zapata. Travis Taveras., DO, Marco Antonio Few        CHIEF COMPLAINT  Chief Complaint   Patient presents with    Fall     PT IN FROM [address] [facility] REPORTING HYPOXIA. PT HAS NO COMPLAINTS OF PAIN OR SOB. EKG COMPLETED AT TRIAGE. PT WEARS A CPAP AT NIGHT AND REFUSED IT. HISTORY OF PRESENT ILLNESS  Jr Dickinson is a 80 y.o. male who presents to the ED for evaluation of hypoxia. EMS was originally called as the patient had had several falls and they wanted him evaluated. The patient was denying any complaint when EMS saw him however they noted that he was hypoxic into the low to mid 80s on room air. He is not normally on oxygen. Patient does usually sleep with the CPAP last night but did not sleep with it last night. He denies that he feels short of breath. Denies any cough. He is fully oriented and has no complaints. Nursing/triage notes reviewed. No other complaints, modifying factors or associated symptoms. REVIEW OF SYSTEMS:  All systems are reviewed and are negative unless noted in the HPI.     PAST MEDICAL HISTORY  Past Medical History:   Diagnosis Date    Anxiety     Aspiration pneumonia (HCC)     BPH     BPH (benign prostatic hyperplasia)     Chronic respiratory failure with hypoxia (HCC)     Cognitive communication deficit     Constipation     Dementia (HCC)     Disorder of prostate, unspecified     Dysphagia     Elevated prostate specific antigen (PSA)     Esophageal reflux     Fracture closed, humerus 12/09/2009    Shoulder    GERD (gastroesophageal reflux disease)     HTN (hypertension)     Hypercapnia     Hypercapnia     Hyperlipidemia     Lateral epicondylitis     Major depressive disorder, recurrent (HCC)     Olecranon bursitis, unspecified laterality     Osteoarthritis     Other abnormalities of gait and mobility     Other lack of coordination     Peripheral neuropathy     Peripheral neuropathy     PNA (pneumonia) Not Established mmol/L    O2 Content, Carlos 20 Not Established VOL %    O2 Therapy Unknown    Troponin   Result Value Ref Range    Troponin, High Sensitivity 47 (H) 0 - 22 ng/L   Troponin   Result Value Ref Range    Troponin, High Sensitivity 50 (H) 0 - 22 ng/L   BNP   Result Value Ref Range    Pro- 0 - 449 pg/mL   Lactic Acid   Result Value Ref Range    Lactic Acid 2.2 (H) 0.4 - 2.0 mmol/L   EKG 12 Lead   Result Value Ref Range    Ventricular Rate 96 BPM    Atrial Rate 96 BPM    QRS Duration 128 ms    Q-T Interval 362 ms    QTc Calculation (Bazett) 457 ms    R Axis -40 degrees    T Axis 30 degrees    Diagnosis       Wide QRS rhythm with Fusion complexesLeft axis deviationRight bundle branch blockAbnormal ECG       RADIOLOGY  Any applicable radiology studies including x-ray, CT, MRI, and/or ultrasound, were reviewed independently by me in addition to the radiologist.  I reviewed all radiology images and reports as well from this evaluation. XR CHEST PORTABLE   Final Result   Dense multifocal right-sided airspace opacities suggesting pneumonia               ED COURSE/MDM  Old records reviewed. Labs and imaging reviewed. Patient seen and evaluated by myself. Patient does present for evaluation of hypoxia. Patient apparently has been having some falls but has no visible injury and is complaining of no injury. Patient also states he does not feel short of breath but is noted to be hypoxic on room air. Patient's work-up here does reveal pneumonia. Given his new oxygen requirement along with his pneumonia I do feel hospitalization is most appropriate. He is given initial dose of Rocephin and azithromycin here and is brought into the hospitalist service.     Consults:  Hospitalist service consulted for admission    History obtained from:   Patient and EMS    Pertinent social determinants of health:   None applicable    Review of other records:  Nursing home or ECF records    Medications given:  Medications

## 2023-11-07 NOTE — PROGRESS NOTES
4 Eyes Skin Assessment     NAME:  Angela Blanchard OF BIRTH:  12/22/1925  MEDICAL RECORD NUMBER:  [id number]    The patient is being assessed for  Admission    I agree that at least one RN has performed a thorough Head to Toe Skin Assessment on the patient. ALL assessment sites listed below have been assessed. Areas assessed by both nurses:    Head, Face, Ears, Shoulders, Back, Chest, Arms, Elbows, Hands, Sacrum. Buttock, Coccyx, Ischium, Legs. Feet and Heels, and Under Medical Devices         Does the Patient have a Wound?  No noted wound(s)       Bautista Prevention initiated by RN: No  Wound Care Orders initiated by RN: No    Pressure Injury (Stage 3,4, Unstageable, DTI, NWPT, and Complex wounds) if present, place Wound referral order by RN under : No    New Ostomies, if present place, Ostomy referral order under : No     Nurse 1 eSignature: Electronically signed by Tom Chisholm RN on 11/7/23 at 6:27 PM EST    **SHARE this note so that the co-signing nurse can place an eSignature**    Nurse 2 eSignature: Electronically signed by Ganga Thomas RN on 11/7/23 at 6:28 PM EST

## 2023-11-07 NOTE — ED NOTES
Report given to Edy GILLIAM RN. All questions were answered. No further concern at this time.      Delia Deutsch, RN  11/07/23 2168

## 2023-11-07 NOTE — PROGRESS NOTES
Patient seen in ED, room 02. Admission completed through Personal Belongings. IP nurse will need to begin with Psychosocial Review and complete the admission including the 4 Eyes Assessment, Immunizations, Vaccines, Rights and Responsibilities, Orientation to room, Plan of Care, Education/Learning Assessment and Education Plan, white board, height and weight, pain assessment and head to toe assessment. Patient is alert when awake and is being admitted for pneumonia. Home Medication Reconciliation was completed by Kassidy Dasilva. All questions answered.

## 2023-11-07 NOTE — ED NOTES
ED TO INPATIENT SBAR HANDOFF    Patient Name: Bo Calixto   :  1925  80 y.o. MRN:  [id number]  Preferred Name  [facility]  ED Room #:  ED-0002/02  Family/Caregiver Present yes   Restraints no   Sitter no   Sepsis Risk Score Sepsis Risk Score: 4.84    Situation  Code Status: FULL CODE No additional code details. Allergies: Morphine  Weight: Patient Vitals for the past 96 hrs (Last 3 readings):   Weight   23 0958 110.5 kg (243 lb 9.7 oz)     Arrived from: nursing home  Chief Complaint:   Chief Complaint   Patient presents with    Fall     PT [facility] REPORTING HYPOXIA. PT HAS NO COMPLAINTS OF PAIN OR SOB. EKG COMPLETED AT TRIAGE. PT WEARS A CPAP AT NIGHT AND REFUSED IT. Hospital Problem/Diagnosis:  Principal Problem:    Pneumonia  Active Problems:    Type 2 diabetes mellitus ([address])    Acute respiratory failure with hypoxia and hypercapnia (HCC)    HTN (hypertension)    Sepsis ([address])    Pneumonia, bacterial  Resolved Problems:    * No resolved hospital problems.  *    Imaging:   XR CHEST PORTABLE   Final Result   Dense multifocal right-sided airspace opacities suggesting pneumonia           Abnormal labs:   Abnormal Labs Reviewed   CBC WITH AUTO DIFFERENTIAL - Abnormal; Notable for the following components:       Result Value    WBC 11.6 (*)     Neutrophils Absolute 10.2 (*)     Lymphocytes Absolute 0.8 (*)     All other components within normal limits   COMPREHENSIVE METABOLIC PANEL W/ REFLEX TO MG FOR LOW K - Abnormal; Notable for the following components:    Glucose 148 (*)     BUN 21 (*)     Est, Glom Filt Rate 50 (*)     All other components within normal limits   BLOOD GAS, VENOUS - Abnormal; Notable for the following components:    pO2, Carlos 182.0 (*)     Base Excess, Carlos 3.1 (*)     Carboxyhemoglobin 4.0 (*)     All other components within normal limits   TROPONIN - Abnormal; Notable for the following components:    Troponin, High Sensitivity 47 (*)     All other

## 2023-11-07 NOTE — PROGRESS NOTES
11/07/23 1755   RT Protocol   History Pulmonary Disease 2   Respiratory pattern 2   Breath sounds 6   Cough 0   Indications for Bronchodilator Therapy Decreased or absent breath sounds   Bronchodilator Assessment Score 10

## 2023-11-08 ENCOUNTER — APPOINTMENT (OUTPATIENT)
Dept: GENERAL RADIOLOGY | Age: 88
End: 2023-11-08
Payer: MEDICARE

## 2023-11-08 PROBLEM — N17.9 AKI (ACUTE KIDNEY INJURY) (HCC): Status: ACTIVE | Noted: 2023-11-08

## 2023-11-08 LAB
ANION GAP SERPL CALCULATED.3IONS-SCNC: 10 MMOL/L (ref 3–16)
BACTERIA URNS QL MICRO: ABNORMAL /HPF
BASOPHILS # BLD: 0.1 K/UL (ref 0–0.2)
BASOPHILS NFR BLD: 0.6 %
BILIRUB UR QL STRIP.AUTO: NEGATIVE
BUN SERPL-MCNC: 28 MG/DL (ref 7–20)
CALCIUM SERPL-MCNC: 8.5 MG/DL (ref 8.3–10.6)
CHLORIDE SERPL-SCNC: 103 MMOL/L (ref 99–110)
CLARITY UR: CLEAR
CO2 SERPL-SCNC: 27 MMOL/L (ref 21–32)
COLOR UR: ABNORMAL
CREAT SERPL-MCNC: 1.6 MG/DL (ref 0.8–1.3)
CRP SERPL-MCNC: 115.3 MG/L (ref 0–5.1)
DEPRECATED RDW RBC AUTO: 14.9 % (ref 12.4–15.4)
EOSINOPHIL # BLD: 0 K/UL (ref 0–0.6)
EOSINOPHIL NFR BLD: 0.3 %
EPI CELLS #/AREA URNS AUTO: 3 /HPF (ref 0–5)
EST. AVERAGE GLUCOSE BLD GHB EST-MCNC: 125.5 MG/DL
GFR SERPLBLD CREATININE-BSD FMLA CKD-EPI: 39 ML/MIN/{1.73_M2}
GLUCOSE BLD-MCNC: 107 MG/DL (ref 70–99)
GLUCOSE BLD-MCNC: 133 MG/DL (ref 70–99)
GLUCOSE BLD-MCNC: 152 MG/DL (ref 70–99)
GLUCOSE BLD-MCNC: 161 MG/DL (ref 70–99)
GLUCOSE SERPL-MCNC: 127 MG/DL (ref 70–99)
GLUCOSE UR STRIP.AUTO-MCNC: NEGATIVE MG/DL
HBA1C MFR BLD: 6 %
HCT VFR BLD AUTO: 40 % (ref 40.5–52.5)
HGB BLD-MCNC: 12.9 G/DL (ref 13.5–17.5)
HGB UR QL STRIP.AUTO: ABNORMAL
HYALINE CASTS #/AREA URNS AUTO: 8 /LPF (ref 0–8)
KETONES UR STRIP.AUTO-MCNC: NEGATIVE MG/DL
LACTATE BLDV-SCNC: 0.8 MMOL/L (ref 0.4–2)
LEUKOCYTE ESTERASE UR QL STRIP.AUTO: ABNORMAL
LYMPHOCYTES # BLD: 1.6 K/UL (ref 1–5.1)
LYMPHOCYTES NFR BLD: 15.4 %
MCH RBC QN AUTO: 31 PG (ref 26–34)
MCHC RBC AUTO-ENTMCNC: 32.2 G/DL (ref 31–36)
MCV RBC AUTO: 96.3 FL (ref 80–100)
MONOCYTES # BLD: 0.6 K/UL (ref 0–1.3)
MONOCYTES NFR BLD: 5.7 %
NEUTROPHILS # BLD: 8.1 K/UL (ref 1.7–7.7)
NEUTROPHILS NFR BLD: 78 %
NITRITE UR QL STRIP.AUTO: NEGATIVE
PERFORMED ON: ABNORMAL
PH UR STRIP.AUTO: 5.5 [PH] (ref 5–8)
PLATELET # BLD AUTO: 122 K/UL (ref 135–450)
PMV BLD AUTO: 9.1 FL (ref 5–10.5)
POTASSIUM SERPL-SCNC: 4 MMOL/L (ref 3.5–5.1)
PROCALCITONIN SERPL IA-MCNC: 2.97 NG/ML (ref 0–0.15)
PROT UR STRIP.AUTO-MCNC: ABNORMAL MG/DL
RBC # BLD AUTO: 4.15 M/UL (ref 4.2–5.9)
RBC CLUMPS #/AREA URNS AUTO: 22 /HPF (ref 0–4)
SODIUM SERPL-SCNC: 140 MMOL/L (ref 136–145)
SP GR UR STRIP.AUTO: 1.01 (ref 1–1.03)
UA COMPLETE W REFLEX CULTURE PNL UR: ABNORMAL
UA DIPSTICK W REFLEX MICRO PNL UR: YES
URN SPEC COLLECT METH UR: ABNORMAL
UROBILINOGEN UR STRIP-ACNC: 1 E.U./DL
WBC # BLD AUTO: 10.4 K/UL (ref 4–11)
WBC #/AREA URNS AUTO: 5 /HPF (ref 0–5)

## 2023-11-08 PROCEDURE — 2700000000 HC OXYGEN THERAPY PER DAY

## 2023-11-08 PROCEDURE — 2580000003 HC RX 258: Performed by: INTERNAL MEDICINE

## 2023-11-08 PROCEDURE — 81001 URINALYSIS AUTO W/SCOPE: CPT

## 2023-11-08 PROCEDURE — 92610 EVALUATE SWALLOWING FUNCTION: CPT

## 2023-11-08 PROCEDURE — 6370000000 HC RX 637 (ALT 250 FOR IP): Performed by: INTERNAL MEDICINE

## 2023-11-08 PROCEDURE — 92526 ORAL FUNCTION THERAPY: CPT

## 2023-11-08 PROCEDURE — 6370000000 HC RX 637 (ALT 250 FOR IP)

## 2023-11-08 PROCEDURE — 84145 PROCALCITONIN (PCT): CPT

## 2023-11-08 PROCEDURE — 83605 ASSAY OF LACTIC ACID: CPT

## 2023-11-08 PROCEDURE — 85025 COMPLETE CBC W/AUTO DIFF WBC: CPT

## 2023-11-08 PROCEDURE — 71046 X-RAY EXAM CHEST 2 VIEWS: CPT

## 2023-11-08 PROCEDURE — 51798 US URINE CAPACITY MEASURE: CPT

## 2023-11-08 PROCEDURE — 6360000002 HC RX W HCPCS: Performed by: INTERNAL MEDICINE

## 2023-11-08 PROCEDURE — 80048 BASIC METABOLIC PNL TOTAL CA: CPT

## 2023-11-08 PROCEDURE — 94761 N-INVAS EAR/PLS OXIMETRY MLT: CPT

## 2023-11-08 PROCEDURE — 87449 NOS EACH ORGANISM AG IA: CPT

## 2023-11-08 PROCEDURE — 94640 AIRWAY INHALATION TREATMENT: CPT

## 2023-11-08 PROCEDURE — 1200000000 HC SEMI PRIVATE

## 2023-11-08 PROCEDURE — 86140 C-REACTIVE PROTEIN: CPT

## 2023-11-08 RX ORDER — TAMSULOSIN HYDROCHLORIDE 0.4 MG/1
0.4 CAPSULE ORAL DAILY
Status: DISCONTINUED | OUTPATIENT
Start: 2023-11-08 | End: 2023-11-10 | Stop reason: HOSPADM

## 2023-11-08 RX ADMIN — ENOXAPARIN SODIUM 30 MG: 100 INJECTION SUBCUTANEOUS at 10:21

## 2023-11-08 RX ADMIN — PANTOPRAZOLE SODIUM 40 MG: 40 TABLET, DELAYED RELEASE ORAL at 06:21

## 2023-11-08 RX ADMIN — PANTOPRAZOLE SODIUM 40 MG: 40 TABLET, DELAYED RELEASE ORAL at 15:10

## 2023-11-08 RX ADMIN — Medication 2 PUFF: at 22:36

## 2023-11-08 RX ADMIN — METHOCARBAMOL TABLETS 500 MG: 500 TABLET, COATED ORAL at 10:20

## 2023-11-08 RX ADMIN — GABAPENTIN 300 MG: 300 CAPSULE ORAL at 15:10

## 2023-11-08 RX ADMIN — IPRATROPIUM BROMIDE AND ALBUTEROL SULFATE 1 DOSE: 2.5; .5 SOLUTION RESPIRATORY (INHALATION) at 09:52

## 2023-11-08 RX ADMIN — Medication 2 PUFF: at 09:53

## 2023-11-08 RX ADMIN — CEFTRIAXONE SODIUM 1000 MG: 1 INJECTION, POWDER, FOR SOLUTION INTRAMUSCULAR; INTRAVENOUS at 15:10

## 2023-11-08 RX ADMIN — SODIUM CHLORIDE: 9 INJECTION, SOLUTION INTRAVENOUS at 22:24

## 2023-11-08 RX ADMIN — AMITRIPTYLINE HYDROCHLORIDE 25 MG: 25 TABLET, FILM COATED ORAL at 20:07

## 2023-11-08 RX ADMIN — IPRATROPIUM BROMIDE AND ALBUTEROL SULFATE 1 DOSE: 2.5; .5 SOLUTION RESPIRATORY (INHALATION) at 22:36

## 2023-11-08 RX ADMIN — GABAPENTIN 300 MG: 300 CAPSULE ORAL at 10:21

## 2023-11-08 RX ADMIN — FINASTERIDE 5 MG: 5 TABLET, FILM COATED ORAL at 10:21

## 2023-11-08 RX ADMIN — ENOXAPARIN SODIUM 30 MG: 100 INJECTION SUBCUTANEOUS at 20:07

## 2023-11-08 RX ADMIN — GABAPENTIN 300 MG: 300 CAPSULE ORAL at 20:07

## 2023-11-08 RX ADMIN — SODIUM CHLORIDE: 9 INJECTION, SOLUTION INTRAVENOUS at 06:20

## 2023-11-08 RX ADMIN — TAMSULOSIN HYDROCHLORIDE 0.4 MG: 0.4 CAPSULE ORAL at 10:21

## 2023-11-08 RX ADMIN — METHOCARBAMOL TABLETS 500 MG: 500 TABLET, COATED ORAL at 20:07

## 2023-11-08 RX ADMIN — IPRATROPIUM BROMIDE AND ALBUTEROL SULFATE 1 DOSE: 2.5; .5 SOLUTION RESPIRATORY (INHALATION) at 16:50

## 2023-11-08 RX ADMIN — AZITHROMYCIN DIHYDRATE 500 MG: 250 TABLET ORAL at 23:39

## 2023-11-08 RX ADMIN — ACETAMINOPHEN 650 MG: 325 TABLET ORAL at 23:38

## 2023-11-08 RX ADMIN — SENNOSIDES AND DOCUSATE SODIUM 2 TABLET: 8.6; 5 TABLET ORAL at 10:20

## 2023-11-08 RX ADMIN — METHOCARBAMOL TABLETS 500 MG: 500 TABLET, COATED ORAL at 15:10

## 2023-11-08 RX ADMIN — GABAPENTIN 300 MG: 300 CAPSULE ORAL at 17:22

## 2023-11-08 RX ADMIN — IPRATROPIUM BROMIDE AND ALBUTEROL SULFATE 1 DOSE: 2.5; .5 SOLUTION RESPIRATORY (INHALATION) at 12:32

## 2023-11-08 RX ADMIN — LOSARTAN POTASSIUM 50 MG: 100 TABLET, FILM COATED ORAL at 10:18

## 2023-11-08 RX ADMIN — METHOCARBAMOL TABLETS 500 MG: 500 TABLET, COATED ORAL at 17:21

## 2023-11-08 ASSESSMENT — PAIN - FUNCTIONAL ASSESSMENT: PAIN_FUNCTIONAL_ASSESSMENT: ACTIVITIES ARE NOT PREVENTED

## 2023-11-08 ASSESSMENT — PAIN DESCRIPTION - ONSET: ONSET: ON-GOING

## 2023-11-08 ASSESSMENT — PAIN DESCRIPTION - FREQUENCY: FREQUENCY: INTERMITTENT

## 2023-11-08 ASSESSMENT — PAIN SCALES - GENERAL
PAINLEVEL_OUTOF10: 3
PAINLEVEL_OUTOF10: 3
PAINLEVEL_OUTOF10: 4
PAINLEVEL_OUTOF10: 4
PAINLEVEL_OUTOF10: 2

## 2023-11-08 ASSESSMENT — PAIN DESCRIPTION - DESCRIPTORS: DESCRIPTORS: ACHING

## 2023-11-08 ASSESSMENT — PAIN DESCRIPTION - ORIENTATION: ORIENTATION: MID

## 2023-11-08 ASSESSMENT — PAIN DESCRIPTION - LOCATION
LOCATION: HEAD
LOCATION: SHOULDER

## 2023-11-08 ASSESSMENT — PAIN DESCRIPTION - PAIN TYPE: TYPE: ACUTE PAIN

## 2023-11-08 NOTE — CONSULTS
Urology Consult Note  [facility]     Patient: Thaddeus De Anda MRN: [id number]  Room/Bed: [address] [place], [place] [address]60-24   YOB: 1925  Age/Sex: 80 y. o.male  Admission Date: 11/7/2023     Date of Service:  11/8/2023    Consulting Provider: Elton Scott PA-C CNP  Admitting/Requesting Physician: Rossana Schneider MD  Primary Care Physician: Stacie Medina MD    Reason for Consult: Urinary Retention    ASSESSMENT/PLAN     79 yo male hx of BPH admitted with hypoxia, pna. Urology consulted for elevated pvrs. Pt reports he is voiding fine and denies any  complaints today. Nursing unable to str cath patient. No urologic imaging this admission. Takes cardura 4 mg daily per previous office note 8/2023. His cr today is 1.6 baseline is WNL. He reports to me he voided fine earlier this morning but only 200 mL output. I bladder scanned him myself during encounter with pvr 434. He is being very noncompliant with a escobar at this time. I discussed with son at [facility]. Denies SP pain/pressure    Recommendations:  -Pt typically takes cardura per previous office note. However, this had been discontinued at [facility]. Will start him on flomax now. Continue finasteride  -Patient being non compliant with escobar. He will refuse to let me place an indwelling at this time. He voided 200-300 mL this morning. I discussed all options/risks/benefits with patient and son. Will see how he does with the flomax, and will  bladder scan him after his next void. If he is unable to void/pvrs remain elevated he will need an indwelling escobar. Pt and son in agreement in plan. -Trend cr  -Will follow call urology with questions     All patient questions were answered. He understands the plan as listed above. HISTORY     Chief Complaint:   Chief Complaint   Patient presents with    Fall     PT [facility] REPORTING HYPOXIA. PT HAS NO COMPLAINTS OF PAIN OR SOB. EKG COMPLETED AT TRIAGE.  PT WEARS A CPAP Negative   Respiratory: Negative   Gastrointestinal: Negative   Musculoskeletal: Negative   Neurological: Negative   Psychiatric: Negative   Integumentary: Negative     PHYSICAL EXAM     Vitals:    11/07/23 2112   BP:    Pulse: 65   Resp: 18   Temp:    SpO2: 95%     CONSTITUTIONAL: The patient is well nourished/developed, with no distress noted. NEUROLOGICAL/PSYCHIATRIC: Oriented to place and time, normal affected noted. NECK: The neck is symmetrical and supple, with no masses noted. CARDIOVASCULAR: Regular appearing rate and rhythm, no evidence of swelling noted. RESPIRATORY: Nasal cannula  ABDOMEN: Abdomen appears soft  GENITOURINARY: Deferred    Ins/Outs:    Intake/Output Summary (Last 24 hours) at 11/8/2023 0940  Last data filed at 11/7/2023 2103  Gross per 24 hour   Intake 240 ml   Output --   Net 240 ml       LABS     CBC   Lab Results   Component Value Date/Time    WBC 10.4 11/08/2023 04:30 AM    RBC 4.15 11/08/2023 04:30 AM    HGB 12.9 11/08/2023 04:30 AM    HCT 40.0 11/08/2023 04:30 AM    MCV 96.3 11/08/2023 04:30 AM    MCH 31.0 11/08/2023 04:30 AM    MCHC 32.2 11/08/2023 04:30 AM    RDW 14.9 11/08/2023 04:30 AM     11/08/2023 04:30 AM    MPV 9.1 11/08/2023 04:30 AM     BMP   Lab Results   Component Value Date/Time     11/08/2023 04:30 AM    K 4.0 11/08/2023 04:30 AM    K 4.5 11/07/2023 10:15 AM     11/08/2023 04:30 AM    CO2 27 11/08/2023 04:30 AM    BUN 28 11/08/2023 04:30 AM    CREATININE 1.6 11/08/2023 04:30 AM    GLUCOSE 127 11/08/2023 04:30 AM    CALCIUM 8.5 11/08/2023 04:30 AM     Urinalysis:   Lab Results   Component Value Date/Time    COLORU DARK YELLOW 06/12/2022 03:42 PM    GLUCOSEU Negative 06/12/2022 03:42 PM    BLOODU Negative 06/12/2022 03:42 PM    NITRU Negative 06/12/2022 03:42 PM    LEUKOCYTESUR TRACE 06/12/2022 03:42 PM     Urine culture: No results for input(s): \"LABURIN\" in the last 72 hours.   PSA: No results found for: \"PSA\"      IMAGING     XR CHEST

## 2023-11-08 NOTE — PROGRESS NOTES
Brief  Note    79 yo male hx of BPH admitted with hypoxia, pna. Urology consulted for elevated pvrs. Pt reports he is voiding fine and denies any  complaints today. Nursing unable to str cath patient. No urologic imaging this admission. Takes cardura 4 mg daily per previous office note 8/2023. His cr today is 1.6 baseline is WNL. He reports to me he voided fine earlier this morning but only 200 mL output. I bladder scanned him myself during encounter with pvr 434. He is being very noncompliant with a escobar at this time. I discussed with son at [facility]. Denies SP pain/pressure  ==  Patient able to void on own this afternoon  Per nursing bladder scan is <60 ccs.     Reccs  Continue flomax  Continue to bladder scan every shift to ensure pt is emptying bladder okay   Can hold off on escobar for now as he is now voiding fine with low pvr  Trend Cr  Will follow call urology with any questions     Signed:  Belkys Watters PA-C  11/8/23

## 2023-11-08 NOTE — PLAN OF CARE
Problem: Safety - Adult  Goal: Free from fall injury  Outcome: Progressing     Problem: Pain  Goal: Verbalizes/displays adequate comfort level or baseline comfort level  Outcome: Progressing     Problem: ABCDS Injury Assessment  Goal: Absence of physical injury  Outcome: Progressing     Problem: Skin/Tissue Integrity  Goal: Absence of new skin breakdown  Description: 1. Monitor for areas of redness and/or skin breakdown  2. Assess vascular access sites hourly  3. Every 4-6 hours minimum:  Change oxygen saturation probe site  4. Every 4-6 hours:  If on nasal continuous positive airway pressure, respiratory therapy assess nares and determine need for appliance change or resting period.   Outcome: Progressing     Problem: Discharge Planning  Goal: Discharge to home or other facility with appropriate resources  Outcome: Progressing

## 2023-11-08 NOTE — PROGRESS NOTES
Facility/Department: [address] NURSING  Speech Language Pathology  DYSPHAGIA BEDSIDE SWALLOW EVALUATION     Patient: Lilian Benedict   : 1925   MRN: [id number]      Evaluation Date: 2023   Admitting Diagnosis: Hypoxemia [R09.02]  Pneumonia, bacterial [J15.9]  Frequent falls [R29.6]  Pneumonia due to infectious organism, unspecified laterality, unspecified part of lung [J18.9]  Pain: Did not state                                                       H&P: 80 y.o. male who presents to the ED for evaluation of hypoxia. EMS was originally called as the patient had had several falls and they wanted him evaluated. The patient was denying any complaint when EMS saw him however they noted that he was hypoxic into the low to mid 80s on room air. He is not normally on oxygen. Patient does usually sleep with the CPAP last night but did not sleep with it last night. Cxr done in ER, reviewed by self, I see pneumonia  Pt remains on oxygen  WBC elevated 11.6  Also some LEO with Creat 2.2     To be admitted  Placed on iv abx    Imaging:  Chest X-ray:   Dense multifocal right-sided airspace opacities suggesting pneumonia    Prior Modified Barium Swallow Study:  Modified Barium Swallow evaluation completed on 2022. Patient presents with oropharyngeal dysphagia secondary to prolonged mastication, decreased A-P bolus transport, intermittent pharyngeal pooling, minimally delayed swallow initiation, diminished pharyngeal stripping wave, decreased laryngeal elevation, decreased epiglottic inversion complicated by advanced age, co morbidities resulting in observed intermittent laryngeal penetration of thin liquids and trace/minimal pharyngeal residue. Laryngeal penetration was noted during the swallow with thin liquids, material entered the airway, remained above the vocal folds and was ejected from the airway.  laryngeal penetration with thin liquids appeared to be due to pharyngeal pooling with delayed swallow

## 2023-11-08 NOTE — CARE COORDINATION
Call to [address] [place] [address]. Will need pre-cert if coming back SNF.      Electronically signed by Taina Justice RN on 11/8/2023 at 9:13 AM

## 2023-11-08 NOTE — PROGRESS NOTES
Patient has no urine output since the beginning of the shift at 7 pm and no records of urine output from day shift. Bladder scan done showed 596 ml, NP [facility] made aware, ordered straight cath.  Electronically signed by Jerica Stephens RN on 11/8/2023 at 1:34 AM

## 2023-11-08 NOTE — PROGRESS NOTES
Progress Note - Dr. Sweetie Ashraf - Internal Medicine  PCP: Kobi Aldana MD [id number] [facility] / [place] [id number] [phone]    Hospital Day: 1  Code Status: Full Code  Current Diet: ADULT DIET; Regular; 4 carb choices (60 gm/meal)        CC: follow up on medical issues    Subjective:   rJ Horton is a 80 y.o. male. Pt seen and examined  Chart reviewed since last visit, labs and imaging below      Complains of pain, suprapubic  Appears to be retaining urine  Fernández could not be placed overnight  Urology consulted    Pt remains on iv abx for pna  Wbc normal, but procal and crp still elevated      Review of Systems: (1 system for EPF, 2-9 for detailed, 10+ for comprehensive)  Constitutional: Negative for chills and fever. HENT: Negative for dental problem, nosebleeds and rhinorrhea. Eyes: Negative for photophobia and visual disturbance. Respiratory: Negative for cough, chest tightness and shortness of breath. Cardiovascular: Negative for chest pain and leg swelling. Gastrointestinal: Negative for diarrhea, nausea and vomiting. Endocrine: Negative for polydipsia and polyphagia. Genitourinary: Negative for frequency, hematuria and urgency. Musculoskeletal: Negative for back pain and myalgias. Skin: Negative for rash. Allergic/Immunologic: Negative for food allergies. Neurological: Negative for dizziness, seizures, syncope and facial asymmetry. Hematological: Negative for adenopathy. Psychiatric/Behavioral: Negative for dysphoric mood. The patient is not nervous/anxious. I have reviewed the patient's medical and social history in detail and updated the computerized patient record.   To recap: He  has a past medical history of Anxiety, Aspiration pneumonia ([address]), BPH, BPH (benign prostatic hyperplasia), Chronic respiratory failure with hypoxia ([address]), Cognitive communication deficit, Constipation, Dementia ([address]), Disorder of prostate, unspecified,

## 2023-11-08 NOTE — PROGRESS NOTES
Aspiration Screen    Name: Mary Sanchez  : 1925  Medical Diagnosis: Hypoxemia [R09.02]  Pneumonia, bacterial [J15.9]  Frequent falls [R29.6]  Pneumonia due to infectious organism, unspecified laterality, unspecified part of lung [J18.9]    Swallow screen to assess aspiration risk completed per pneumonia protocol. Patient demonstrates some high risk indicators for potential dysphagia / aspiration per swallow screen. RECOMMEND: Clinical Swallow Evaluation at bedside to assess swallowing function, rule out aspiration, and determine appropriate diet level.      Solomon Haddad M.A., [address] [place] [address]  Speech-Language Pathologist

## 2023-11-08 NOTE — PROGRESS NOTES
Tried Straight cath and coude but unsuccessful. MIKAL Anaya made aware.  Electronically signed by Dinah Santizo RN on 11/8/2023 at 2:24 AM

## 2023-11-09 ENCOUNTER — APPOINTMENT (OUTPATIENT)
Dept: GENERAL RADIOLOGY | Age: 88
End: 2023-11-09
Payer: MEDICARE

## 2023-11-09 LAB
ANION GAP SERPL CALCULATED.3IONS-SCNC: 10 MMOL/L (ref 3–16)
BASOPHILS # BLD: 0 K/UL (ref 0–0.2)
BASOPHILS NFR BLD: 0.5 %
BUN SERPL-MCNC: 20 MG/DL (ref 7–20)
CALCIUM SERPL-MCNC: 9 MG/DL (ref 8.3–10.6)
CHLORIDE SERPL-SCNC: 107 MMOL/L (ref 99–110)
CO2 SERPL-SCNC: 26 MMOL/L (ref 21–32)
CREAT SERPL-MCNC: 1 MG/DL (ref 0.8–1.3)
DEPRECATED RDW RBC AUTO: 14.5 % (ref 12.4–15.4)
EOSINOPHIL # BLD: 0.1 K/UL (ref 0–0.6)
EOSINOPHIL NFR BLD: 0.8 %
GFR SERPLBLD CREATININE-BSD FMLA CKD-EPI: >60 ML/MIN/{1.73_M2}
GLUCOSE BLD-MCNC: 103 MG/DL (ref 70–99)
GLUCOSE BLD-MCNC: 112 MG/DL (ref 70–99)
GLUCOSE BLD-MCNC: 120 MG/DL (ref 70–99)
GLUCOSE BLD-MCNC: 146 MG/DL (ref 70–99)
GLUCOSE SERPL-MCNC: 129 MG/DL (ref 70–99)
HCT VFR BLD AUTO: 37 % (ref 40.5–52.5)
HGB BLD-MCNC: 12.1 G/DL (ref 13.5–17.5)
LACTATE BLDV-SCNC: 0.6 MMOL/L (ref 0.4–2)
LEGIONELLA AG UR QL: NORMAL
LYMPHOCYTES # BLD: 1.2 K/UL (ref 1–5.1)
LYMPHOCYTES NFR BLD: 19.5 %
MCH RBC QN AUTO: 30.8 PG (ref 26–34)
MCHC RBC AUTO-ENTMCNC: 32.7 G/DL (ref 31–36)
MCV RBC AUTO: 94.2 FL (ref 80–100)
MONOCYTES # BLD: 0.4 K/UL (ref 0–1.3)
MONOCYTES NFR BLD: 7.4 %
NEUTROPHILS # BLD: 4.3 K/UL (ref 1.7–7.7)
NEUTROPHILS NFR BLD: 71.8 %
PERFORMED ON: ABNORMAL
PLATELET # BLD AUTO: 140 K/UL (ref 135–450)
PMV BLD AUTO: 8.1 FL (ref 5–10.5)
POTASSIUM SERPL-SCNC: 4.1 MMOL/L (ref 3.5–5.1)
PROCALCITONIN SERPL IA-MCNC: 1.65 NG/ML (ref 0–0.15)
RBC # BLD AUTO: 3.93 M/UL (ref 4.2–5.9)
SODIUM SERPL-SCNC: 143 MMOL/L (ref 136–145)
WBC # BLD AUTO: 6 K/UL (ref 4–11)

## 2023-11-09 PROCEDURE — 6370000000 HC RX 637 (ALT 250 FOR IP): Performed by: INTERNAL MEDICINE

## 2023-11-09 PROCEDURE — 36415 COLL VENOUS BLD VENIPUNCTURE: CPT

## 2023-11-09 PROCEDURE — 1200000000 HC SEMI PRIVATE

## 2023-11-09 PROCEDURE — 94761 N-INVAS EAR/PLS OXIMETRY MLT: CPT

## 2023-11-09 PROCEDURE — 80048 BASIC METABOLIC PNL TOTAL CA: CPT

## 2023-11-09 PROCEDURE — 94640 AIRWAY INHALATION TREATMENT: CPT

## 2023-11-09 PROCEDURE — 6360000002 HC RX W HCPCS: Performed by: INTERNAL MEDICINE

## 2023-11-09 PROCEDURE — 6370000000 HC RX 637 (ALT 250 FOR IP)

## 2023-11-09 PROCEDURE — 84145 PROCALCITONIN (PCT): CPT

## 2023-11-09 PROCEDURE — 97535 SELF CARE MNGMENT TRAINING: CPT

## 2023-11-09 PROCEDURE — 97530 THERAPEUTIC ACTIVITIES: CPT

## 2023-11-09 PROCEDURE — 2700000000 HC OXYGEN THERAPY PER DAY

## 2023-11-09 PROCEDURE — 85025 COMPLETE CBC W/AUTO DIFF WBC: CPT

## 2023-11-09 PROCEDURE — 92526 ORAL FUNCTION THERAPY: CPT

## 2023-11-09 PROCEDURE — 2580000003 HC RX 258: Performed by: INTERNAL MEDICINE

## 2023-11-09 PROCEDURE — 83605 ASSAY OF LACTIC ACID: CPT

## 2023-11-09 PROCEDURE — 97116 GAIT TRAINING THERAPY: CPT

## 2023-11-09 PROCEDURE — 97162 PT EVAL MOD COMPLEX 30 MIN: CPT

## 2023-11-09 PROCEDURE — 73030 X-RAY EXAM OF SHOULDER: CPT

## 2023-11-09 PROCEDURE — 97165 OT EVAL LOW COMPLEX 30 MIN: CPT

## 2023-11-09 RX ORDER — FLUTICASONE PROPIONATE 50 MCG
1 SPRAY, SUSPENSION (ML) NASAL DAILY
Status: DISCONTINUED | OUTPATIENT
Start: 2023-11-09 | End: 2023-11-10 | Stop reason: HOSPADM

## 2023-11-09 RX ORDER — LOSARTAN POTASSIUM 100 MG/1
100 TABLET ORAL DAILY
Status: DISCONTINUED | OUTPATIENT
Start: 2023-11-10 | End: 2023-11-10 | Stop reason: HOSPADM

## 2023-11-09 RX ORDER — CEFUROXIME AXETIL 250 MG/1
500 TABLET ORAL EVERY 12 HOURS SCHEDULED
Status: DISCONTINUED | OUTPATIENT
Start: 2023-11-09 | End: 2023-11-10 | Stop reason: HOSPADM

## 2023-11-09 RX ORDER — AMLODIPINE BESYLATE 5 MG/1
10 TABLET ORAL DAILY
Status: DISCONTINUED | OUTPATIENT
Start: 2023-11-09 | End: 2023-11-10 | Stop reason: HOSPADM

## 2023-11-09 RX ORDER — TAMSULOSIN HYDROCHLORIDE 0.4 MG/1
0.4 CAPSULE ORAL DAILY
Qty: 30 CAPSULE | Refills: 3 | Status: SHIPPED | OUTPATIENT
Start: 2023-11-10

## 2023-11-09 RX ORDER — CEFUROXIME AXETIL 500 MG/1
500 TABLET ORAL EVERY 12 HOURS SCHEDULED
Qty: 14 TABLET | Refills: 0 | Status: SHIPPED | OUTPATIENT
Start: 2023-11-09 | End: 2023-11-16

## 2023-11-09 RX ADMIN — METHOCARBAMOL TABLETS 500 MG: 500 TABLET, COATED ORAL at 21:55

## 2023-11-09 RX ADMIN — AMLODIPINE BESYLATE 10 MG: 5 TABLET ORAL at 21:55

## 2023-11-09 RX ADMIN — ENOXAPARIN SODIUM 30 MG: 100 INJECTION SUBCUTANEOUS at 08:18

## 2023-11-09 RX ADMIN — GABAPENTIN 300 MG: 300 CAPSULE ORAL at 15:03

## 2023-11-09 RX ADMIN — Medication 2 PUFF: at 08:04

## 2023-11-09 RX ADMIN — IPRATROPIUM BROMIDE AND ALBUTEROL SULFATE 1 DOSE: 2.5; .5 SOLUTION RESPIRATORY (INHALATION) at 08:04

## 2023-11-09 RX ADMIN — FINASTERIDE 5 MG: 5 TABLET, FILM COATED ORAL at 08:18

## 2023-11-09 RX ADMIN — SENNOSIDES AND DOCUSATE SODIUM 2 TABLET: 8.6; 5 TABLET ORAL at 08:18

## 2023-11-09 RX ADMIN — HYDRALAZINE HYDROCHLORIDE 10 MG: 20 INJECTION INTRAMUSCULAR; INTRAVENOUS at 17:55

## 2023-11-09 RX ADMIN — IPRATROPIUM BROMIDE AND ALBUTEROL SULFATE 1 DOSE: 2.5; .5 SOLUTION RESPIRATORY (INHALATION) at 11:28

## 2023-11-09 RX ADMIN — FLUTICASONE PROPIONATE 1 SPRAY: 50 SPRAY, METERED NASAL at 18:43

## 2023-11-09 RX ADMIN — METHOCARBAMOL TABLETS 500 MG: 500 TABLET, COATED ORAL at 17:55

## 2023-11-09 RX ADMIN — PANTOPRAZOLE SODIUM 40 MG: 40 TABLET, DELAYED RELEASE ORAL at 08:17

## 2023-11-09 RX ADMIN — LOSARTAN POTASSIUM 50 MG: 100 TABLET, FILM COATED ORAL at 08:18

## 2023-11-09 RX ADMIN — IPRATROPIUM BROMIDE AND ALBUTEROL SULFATE 1 DOSE: 2.5; .5 SOLUTION RESPIRATORY (INHALATION) at 15:18

## 2023-11-09 RX ADMIN — Medication 2 PUFF: at 19:54

## 2023-11-09 RX ADMIN — ENOXAPARIN SODIUM 30 MG: 100 INJECTION SUBCUTANEOUS at 21:56

## 2023-11-09 RX ADMIN — GABAPENTIN 300 MG: 300 CAPSULE ORAL at 21:56

## 2023-11-09 RX ADMIN — GABAPENTIN 300 MG: 300 CAPSULE ORAL at 17:55

## 2023-11-09 RX ADMIN — TAMSULOSIN HYDROCHLORIDE 0.4 MG: 0.4 CAPSULE ORAL at 08:18

## 2023-11-09 RX ADMIN — PANTOPRAZOLE SODIUM 40 MG: 40 TABLET, DELAYED RELEASE ORAL at 17:55

## 2023-11-09 RX ADMIN — METHOCARBAMOL TABLETS 500 MG: 500 TABLET, COATED ORAL at 15:03

## 2023-11-09 RX ADMIN — CEFUROXIME AXETIL 500 MG: 250 TABLET ORAL at 11:51

## 2023-11-09 RX ADMIN — HYDRALAZINE HYDROCHLORIDE 10 MG: 20 INJECTION INTRAMUSCULAR; INTRAVENOUS at 11:51

## 2023-11-09 RX ADMIN — METHOCARBAMOL TABLETS 500 MG: 500 TABLET, COATED ORAL at 08:18

## 2023-11-09 RX ADMIN — CEFUROXIME AXETIL 500 MG: 250 TABLET ORAL at 21:56

## 2023-11-09 RX ADMIN — Medication 10 ML: at 22:03

## 2023-11-09 RX ADMIN — GABAPENTIN 300 MG: 300 CAPSULE ORAL at 08:18

## 2023-11-09 RX ADMIN — IPRATROPIUM BROMIDE AND ALBUTEROL SULFATE 1 DOSE: 2.5; .5 SOLUTION RESPIRATORY (INHALATION) at 19:54

## 2023-11-09 RX ADMIN — AMITRIPTYLINE HYDROCHLORIDE 25 MG: 25 TABLET, FILM COATED ORAL at 21:55

## 2023-11-09 ASSESSMENT — PAIN SCALES - GENERAL
PAINLEVEL_OUTOF10: 0

## 2023-11-09 NOTE — PROGRESS NOTES
[facility] Department   Phone: [phone]    Physical Therapy    [x] Initial Evaluation            [] Daily Treatment Note         [] Discharge Summary      Patient: Callie Raya   : 1925   MRN: [id number]   Date of Service:  2023  Admitting Diagnosis: Pneumonia  Current Admission Summary: Callie Raya is a 80 y.o. male who presents to the ED for evaluation of hypoxia. EMS was originally called as the patient had had several falls and they wanted him evaluated. The patient was denying any complaint when EMS saw him however they noted that he was hypoxic into the low to mid 80s on room air. He is not normally on oxygen. Patient does usually sleep with the CPAP last night but did not sleep with it last night. Past Medical History:  has a past medical history of Anxiety, Aspiration pneumonia ([address]), BPH, BPH (benign prostatic hyperplasia), Chronic respiratory failure with hypoxia ([address]), Cognitive communication deficit, Constipation, Dementia ([address]), Disorder of prostate, unspecified, Dysphagia, Elevated prostate specific antigen (PSA), Esophageal reflux, Fracture closed, humerus, GERD (gastroesophageal reflux disease), HTN (hypertension), Hypercapnia, Hypercapnia, Hyperlipidemia, Lateral epicondylitis, Major depressive disorder, recurrent ([address]), Olecranon bursitis, unspecified laterality, Osteoarthritis, Other abnormalities of gait and mobility, Other lack of coordination, Peripheral neuropathy, Peripheral neuropathy, PNA (pneumonia), Polyneuropathy, Retention of urine, Type II or unspecified type diabetes mellitus without mention of complication, not stated as uncontrolled, Unsteadiness on feet, Vitamin deficiency, and Weakness. Past Surgical History:  has a past surgical history that includes knee surgery (); Ankle surgery (); Tonsillectomy (); shoulder surgery (02/09/10); Colonoscopy (10-02); Cataract removal (10-03);  Total knee arthroplasty use of UE support  Static Standing Balance: fair (-): maintains balance at CGA with use of UE support  Dynamic Standing Balance: fair (-): maintains balance at CGA with use of UE support  Comments:    Other Therapeutic Interventions    Functional Outcomes  -PAC Inpatient Mobility Raw Score : 16              Cognition  Overall Cognitive Status: Impaired  Memory: decreased recall of recent events, decreased short term memory  Safety Judgement: decreased awareness of need for assistance, decreased awareness of need for safety  Insights: decreased awareness of deficits  Orientation:    alert and oriented x 4  Command Following:   [facility]    Education  Barriers To Learning: hearing  Patient Education: patient educated on goals, PT role and benefits, plan of care, transfer training, discharge recommendations  Learning Assessment:  patient verbalizes and demonstrates understanding    Assessment  Activity Tolerance: limited by fatigue  Impairments Requiring Therapeutic Intervention: decreased functional mobility, decreased safety awareness, decreased endurance, decreased balance  Prognosis: good  Clinical Assessment: Patient not at baseline function and would benefit from skilled PT to address above deficits and facilitate return to baseline function. Patient demonstrating decreased safety awareness and decreased balance with min A needed to correct balance with turning.      Safety Interventions: patient left in chair, chair alarm in place, call light within reach, patient at risk for falls, and nurse notified    Plan  Frequency: 3-5 x/per week  Current Treatment Recommendations: strengthening, balance training, functional mobility training, transfer training, gait training, endurance training, home exercise program, and safety education    Goals  Patient Goals: did not state   Short Term Goals:  Time Frame: discharge  Patient will complete bed mobility at stand by assistance   Patient will complete transfers at stand by

## 2023-11-09 NOTE — CARE COORDINATION
Patient noted to have a discharge order.   Pt has been medically cleared for transition to [address]    Patient discharged to   [facility] AT [address]  Phone: [phone]  Fax: [phone]  Call to  [facility]  [phone]     HENS Completed:  YES    Pre-cert required/obtained:  yes    Transportation scheduled for 17:30 pm    Transportation provided by   [facility]  ([phone])     AVS faxed and agency notified: [phone]  Herlinda Sweet states no need to send RONY-they care access Epic    The following prescriptions sent with pt: NONE    Family Notified:  yes  RN Jayce Jameson states she will let family know    Nurse to call report to facility [phone]  Call Jayce Jameson RN to complete RONY

## 2023-11-09 NOTE — PLAN OF CARE
Problem: Discharge Planning  Goal: Discharge to home or other facility with appropriate resources  11/9/2023 1008 by Ashley Bro RN  Outcome: Progressing  11/8/2023 2207 by Alexandra Pink RN  Outcome: Progressing  Flowsheets (Taken 11/8/2023 2015)  Discharge to home or other facility with appropriate resources: Identify barriers to discharge with patient and caregiver     Problem: Safety - Adult  Goal: Free from fall injury  11/9/2023 1008 by Ashley Bro RN  Outcome: Progressing  11/8/2023 2207 by Alexandra Pink RN  Outcome: Progressing     Problem: Pain  Goal: Verbalizes/displays adequate comfort level or baseline comfort level  11/9/2023 1008 by Ashley Bro RN  Outcome: Progressing  11/8/2023 2207 by Alexandra Pink RN  Outcome: Progressing     Problem: ABCDS Injury Assessment  Goal: Absence of physical injury  11/9/2023 1008 by Ashley Bro RN  Outcome: Progressing  11/8/2023 2207 by Alexandra Pink RN  Outcome: Progressing     Problem: Skin/Tissue Integrity  Goal: Absence of new skin breakdown  Description: 1. Monitor for areas of redness and/or skin breakdown  2. Assess vascular access sites hourly  3. Every 4-6 hours minimum:  Change oxygen saturation probe site  4. Every 4-6 hours:  If on nasal continuous positive airway pressure, respiratory therapy assess nares and determine need for appliance change or resting period.   11/9/2023 1008 by Ashley Bro RN  Outcome: Progressing  11/8/2023 2207 by Alexandra Pink RN  Outcome: Progressing

## 2023-11-09 NOTE — DISCHARGE INSTR - COC
Continuity of Care Form    Patient Name: Giselle Fernández   :  1925  MRN:  [id number]    Admit date:  2023  Discharge date:  2023    Code Status Order: Full Code   Advance Directives:     Admitting Physician:  Lalito Nelson MD  PCP: Virlinda Mohs, MD    Discharging Nurse: Seleta Hatchet [facility] Unit/Room#: 1GL-7180/2565-36  Discharging Unit Phone Number: [phone]    Emergency Contact:   Extended Emergency Contact Information  Primary Emergency Contact: [facility] Phone: [phone]  Relation: Child  Secondary Emergency Contact: [facility]  Address: [address] [place] [address] of [address] Phone: [phone]  Mobile Phone: [phone]  Relation: Child    Past Surgical History:  Past Surgical History:   Procedure Laterality Date    ANKLE SURGERY      CATARACT REMOVAL  10-03    Right    COLONOSCOPY  10-02    Normal    KNEE SURGERY      Arthroscopic Right    REVISION TOTAL KNEE ARTHROPLASTY  09    Infected knee prosthesis removed, space placed(Right knee)    REVISION TOTAL KNEE ARTHROPLASTY  09    Right knee spacer removed and new joint inserted    SHOULDER SURGERY  02/09/10    Total Shoulder arthroplasty    TONSILLECTOMY      TOTAL KNEE ARTHROPLASTY  07    Right    TOTAL KNEE ARTHROPLASTY      Left       Immunization History:   Immunization History   Administered Date(s) Administered    COVID-19, PFIZER PURPLE top, DILUTE for use, (age 15 y+), 30mcg/0.3mL 2021    Influenza Whole 09/15/2009    Pneumococcal Conjugate 7-valent (Terisa Ly) 10/22/1998    Td, unspecified formulation 2009    Zoster Live (Zostavax) 2010       Active Problems:  Patient Active Problem List   Diagnosis Code    Peripheral neuropathy G62.9    Type II or unspecified type diabetes mellitus without mention of complication, not stated as uncontrolled E11.9    Esophageal reflux K21.9    HTN (hypertension) Video (Video Swallowing Test): not done    Treatments at the Time of Hospital Discharge:   Respiratory Treatments:HHN  Oxygen Therapy:  is on oxygen at 2 L/min per nasal cannula. Ventilator:    - No ventilator support    Rehab Therapies: Physical Therapy, Occupational Therapy, and Speech/Language Therapy  Weight Bearing Status/Restrictions: No weight bearing restrictions  Other Medical Equipment (for information only, NOT a DME order):  cane and walker  Other Treatments: none    Patient's personal belongings (please select all that are sent with patient):  Glasses    Nurse to call report to facility [phone]    RN SIGNATURE:  Electronically signed by Mitra Astudillo RN on 11/10/23 at 5:13 PM EST    CASE MANAGEMENT/SOCIAL WORK SECTION    Inpatient Status Date: 11-7-2023    Readmission Risk Assessment Score: 12  Readmission Risk              Risk of Unplanned Readmission:  16           Discharging to Facility/ [facility]  [address], [address] [place], [place] [address]  Report: [phone]  Fax: [phone]     / signature: Electronically signed by Edilson Vega RN on 11/9/23 at 4:03 PM EST    PHYSICIAN SECTION    Prognosis: Fair    Condition at Discharge: Stable    Rehab Potential (if transferring to Rehab): Fair    Recommended Labs or Other Treatments After Discharge: assisted living  follow up with Dr Carl Curran     Physician Certification: I certify the above information and transfer of Jr Pandya  is necessary for the continuing treatment of the diagnosis listed and that he requires Assisted Living for greater 30 days.      Update Admission H&P: No change in H&P    PHYSICIAN SIGNATURE:  Electronically signed by Susu Telles MD on 11/9/23 at 9:38 AM EST

## 2023-11-09 NOTE — PROGRESS NOTES
Pt BP elevated after receiving prn hydralazine. MD notified, still no response. Will continue to monitor.

## 2023-11-09 NOTE — PROGRESS NOTES
Patient is in the chair with call light in reach, medications given per [facility], shift assessment completed. The care plan and education has been reviewed and mutually agreed upon with the patient.

## 2023-11-09 NOTE — PROGRESS NOTES
Facility/Department: [address] NURSING  Speech Language Pathology   Dysphagia Treatment Note    Patient: Lennard Kocher   : 1925   MRN: [id number]      Evaluation Date: 2023      Admitting Dx: Hypoxemia [R09.02]  Pneumonia, bacterial [J15.9]  Frequent falls [R29.6]  Pneumonia due to infectious organism, unspecified laterality, unspecified part of lung [J18.9]  Treatment Diagnosis: Oropharyngeal Dysphagia   Pain: Did not state                                              Diet and Treatment Recommendations 2023:  Diet Solids Recommendation:  Regular texture diet  Liquid Consistency Recommendation: Thin liquids, No straws  Recommended form of Meds: Meds whole with water        Compensatory strategies: Alternate solids/liquids , Upright as possible with all PO intake , Small bites/sips , Eat/feed slowly, Remain upright 30-45 min     Assessment of Texture Tolerance:  Diet level prior to treatment: Regular texture diet , Thin liquids, No straws   Tolerance of Current Diet Level:Per chart, no noted difficulty with current diet level     Impressions: Pt was positioned Upright in chair, awake and alert. Currently on  1L O2 via nasal cannula . Pt had recently finished breakfast meal with no reported difficulty. Trials of thin liquids and regular solids  were provided to assess swallow function. Mildly prolonged however functional mastication was noted with regular solids, effective oral clearing achieved. Thin liquids via cup revealed suspected premature bolus loss to pharynx with delayed swallow initiation, delayed cough noted x1. No further overt clinical s/s of aspiration were assessed. Education was provided regarding use of safe swallowing strategies. Overall, Pt demonstrates increased risk for aspiration due to co morbidities , deconditioning , respiratory status , and prior hx of dysphagia .  Based on today's assessment recommend Regular texture diet  with Thin liquids, No straws , Meds whole with

## 2023-11-09 NOTE — CARE COORDINATION
[facility] arrived. Re-purposed for transportation for a different patient. Spoke with Supervisor Zay Fowler and Manager Priscila Ordaz on the disposition of the case. Advised to speak with the daughter to make her aware that she will be responsible for his ASL expenses at [facility] and subsequent move, if that is their choice. Daughter Keyonna Soto acknowledged and said she has done this several times and knows the financial ramifications. Made aware that patient will be discharged to one of their SNF choices as soon as pre-cert is decided. She acknowledged and agreed with discharge POC. Advised by management to put in avoidable days. Call to Molly  [phone] at [facility] to let her know the patient and his daughter Keyonna Soto chose to go to a different facility. CM to follow.     Electronically signed by Shira Ahmadi RN on 11/9/2023 at 5:00 PM

## 2023-11-09 NOTE — PLAN OF CARE
Problem: Discharge Planning  Goal: Discharge to home or other facility with appropriate resources  Outcome: Progressing  Flowsheets (Taken 11/8/2023 2015)  Discharge to home or other facility with appropriate resources: Identify barriers to discharge with patient and caregiver     Problem: Safety - Adult  Goal: Free from fall injury  Outcome: Progressing     Problem: Pain  Goal: Verbalizes/displays adequate comfort level or baseline comfort level  Outcome: Progressing     Problem: ABCDS Injury Assessment  Goal: Absence of physical injury  Outcome: Progressing     Problem: Skin/Tissue Integrity  Goal: Absence of new skin breakdown  Description: 1. Monitor for areas of redness and/or skin breakdown  2. Assess vascular access sites hourly  3. Every 4-6 hours minimum:  Change oxygen saturation probe site  4. Every 4-6 hours:  If on nasal continuous positive airway pressure, respiratory therapy assess nares and determine need for appliance change or resting period.   Outcome: Progressing

## 2023-11-09 NOTE — CARE COORDINATION
Family declines [address]    Discharge Planning Note Re: Skilled Nursing     CM/SW noted consult for discharge planning. Chart reviewed. Noted recommendations for SNF. CM met with patient and daughter Andre Parham. Introduced self and explained role of CM and discharge planning. Patient is agreeable to SNF on dc. [place] of choice list was provided with basic dialogue that supports the patient's individualized plan of care/goals, treatment preferences and shares the quality data associated with the providers. [x] Yes [] No.  Patient and daughter Andre Parham have reviewed the provided list and made selections. Referral made to   [facility]  [address] [address] [facility]  Report: [phone]  Fax: [facility]  [address] [facility], [address]  Phone: [phone]  Fax: [phone]     [facility]  [address], [address] [place] [address]  Phone: [phone]  Fax: [phone] (Assisted Living)  Fax: [phone] [facility]     CM/SW will follow-up on referrals and provide any additional documentation necessary to facilitate placement.       Electronically signed by Lorrie Garza RN on 11/9/2023 at 4:14 PM

## 2023-11-09 NOTE — PROGRESS NOTES
[facility] Department   Phone: [phone]    Occupational Therapy    [x] Initial Evaluation            [] Daily Treatment Note         [] Discharge Summary      Patient: Truong Felix   : 1925   MRN: [id number]   Date of Service:  2023    Admitting Diagnosis:  Pneumonia  Current Admission Summary:   80 y.o. male who presents to the ED for evaluation of hypoxia. EMS was originally called as the patient had had several falls and they wanted him evaluated. The patient was denying any complaint when EMS saw him however they noted that he was hypoxic into the low to mid 80s on room air. He is not normally on oxygen. Patient does usually sleep with the CPAP last night but did not sleep with it last night. Cxr done in ER, reviewed by self, I see pneumonia  Pt remains on oxygen  WBC elevated 11.6  Also some LEO with Creat 2.2  Past Medical History:  has a past medical history of Anxiety, Aspiration pneumonia ([address]), BPH, BPH (benign prostatic hyperplasia), Chronic respiratory failure with hypoxia ([address]), Cognitive communication deficit, Constipation, Dementia ([address]), Disorder of prostate, unspecified, Dysphagia, Elevated prostate specific antigen (PSA), Esophageal reflux, Fracture closed, humerus, GERD (gastroesophageal reflux disease), HTN (hypertension), Hypercapnia, Hypercapnia, Hyperlipidemia, Lateral epicondylitis, Major depressive disorder, recurrent ([address]), Olecranon bursitis, unspecified laterality, Osteoarthritis, Other abnormalities of gait and mobility, Other lack of coordination, Peripheral neuropathy, Peripheral neuropathy, PNA (pneumonia), Polyneuropathy, Retention of urine, Type II or unspecified type diabetes mellitus without mention of complication, not stated as uncontrolled, Unsteadiness on feet, Vitamin deficiency, and Weakness. Past Surgical History:  has a past surgical history that includes knee surgery ();  Ankle surgery ();

## 2023-11-10 VITALS
OXYGEN SATURATION: 93 % | HEART RATE: 74 BPM | WEIGHT: 239.9 LBS | HEIGHT: 70 IN | BODY MASS INDEX: 34.35 KG/M2 | RESPIRATION RATE: 18 BRPM | TEMPERATURE: 97.5 F | DIASTOLIC BLOOD PRESSURE: 67 MMHG | SYSTOLIC BLOOD PRESSURE: 167 MMHG

## 2023-11-10 LAB
GLUCOSE BLD-MCNC: 102 MG/DL (ref 70–99)
GLUCOSE BLD-MCNC: 127 MG/DL (ref 70–99)
GLUCOSE BLD-MCNC: 99 MG/DL (ref 70–99)
PERFORMED ON: ABNORMAL
PERFORMED ON: ABNORMAL
PERFORMED ON: NORMAL

## 2023-11-10 PROCEDURE — 2580000003 HC RX 258: Performed by: INTERNAL MEDICINE

## 2023-11-10 PROCEDURE — 2700000000 HC OXYGEN THERAPY PER DAY

## 2023-11-10 PROCEDURE — 6370000000 HC RX 637 (ALT 250 FOR IP): Performed by: INTERNAL MEDICINE

## 2023-11-10 PROCEDURE — 92526 ORAL FUNCTION THERAPY: CPT

## 2023-11-10 PROCEDURE — 97116 GAIT TRAINING THERAPY: CPT

## 2023-11-10 PROCEDURE — 94761 N-INVAS EAR/PLS OXIMETRY MLT: CPT

## 2023-11-10 PROCEDURE — 6360000002 HC RX W HCPCS: Performed by: INTERNAL MEDICINE

## 2023-11-10 PROCEDURE — 6370000000 HC RX 637 (ALT 250 FOR IP)

## 2023-11-10 PROCEDURE — 94640 AIRWAY INHALATION TREATMENT: CPT

## 2023-11-10 PROCEDURE — 97110 THERAPEUTIC EXERCISES: CPT

## 2023-11-10 RX ADMIN — FINASTERIDE 5 MG: 5 TABLET, FILM COATED ORAL at 11:00

## 2023-11-10 RX ADMIN — METHOCARBAMOL TABLETS 500 MG: 500 TABLET, COATED ORAL at 17:20

## 2023-11-10 RX ADMIN — HYDRALAZINE HYDROCHLORIDE 10 MG: 20 INJECTION INTRAMUSCULAR; INTRAVENOUS at 12:22

## 2023-11-10 RX ADMIN — Medication 10 ML: at 11:00

## 2023-11-10 RX ADMIN — SENNOSIDES AND DOCUSATE SODIUM 2 TABLET: 8.6; 5 TABLET ORAL at 11:00

## 2023-11-10 RX ADMIN — IPRATROPIUM BROMIDE AND ALBUTEROL SULFATE 1 DOSE: 2.5; .5 SOLUTION RESPIRATORY (INHALATION) at 16:35

## 2023-11-10 RX ADMIN — GABAPENTIN 300 MG: 300 CAPSULE ORAL at 17:20

## 2023-11-10 RX ADMIN — ENOXAPARIN SODIUM 30 MG: 100 INJECTION SUBCUTANEOUS at 11:00

## 2023-11-10 RX ADMIN — CEFUROXIME AXETIL 500 MG: 250 TABLET ORAL at 11:00

## 2023-11-10 RX ADMIN — PANTOPRAZOLE SODIUM 40 MG: 40 TABLET, DELAYED RELEASE ORAL at 16:35

## 2023-11-10 RX ADMIN — METHOCARBAMOL TABLETS 500 MG: 500 TABLET, COATED ORAL at 11:00

## 2023-11-10 RX ADMIN — AMLODIPINE BESYLATE 10 MG: 5 TABLET ORAL at 11:00

## 2023-11-10 RX ADMIN — FLUTICASONE PROPIONATE 1 SPRAY: 50 SPRAY, METERED NASAL at 11:00

## 2023-11-10 RX ADMIN — TAMSULOSIN HYDROCHLORIDE 0.4 MG: 0.4 CAPSULE ORAL at 11:00

## 2023-11-10 RX ADMIN — ALBUTEROL SULFATE 2.5 MG: 2.5 SOLUTION RESPIRATORY (INHALATION) at 14:01

## 2023-11-10 RX ADMIN — GABAPENTIN 300 MG: 300 CAPSULE ORAL at 11:00

## 2023-11-10 RX ADMIN — LOSARTAN POTASSIUM 100 MG: 100 TABLET, FILM COATED ORAL at 11:00

## 2023-11-10 NOTE — PROGRESS NOTES
Occupational Therapy  Jr Brown     Attempted to see patient for OT treatment session. Patient currently working with PT. Will check back as schedule allows.      Thanks,   Mera Melendez, MOT OTR/L [id number]

## 2023-11-10 NOTE — PROGRESS NOTES
Urology Progress Note  [facility]    Provider: Zach Epps PA-C  Patient ID:  Admission Date: 2023 Name: Phares Mcburney MRN: [id number]   Patient Location: [id number] : 1925  Attending: Naida Smith MD Date of Service: 11/10/2023  PCP: Bertha Ha MD     Diagnoses:  1. Pneumonia due to infectious organism, unspecified laterality, unspecified part of lung    2. Hypoxemia    3. Frequent falls      81 yo male hx of BPH admitted with hypoxia, pna. Urology consulted for elevated pvrs. Pt reports he is voiding fine and denies any  complaints today. Nursing unable to str cath patient. No urologic imaging this admission. Takes cardura 4 mg daily per previous office note 2023. His cr today is 1.6 baseline is WNL. He reports to me he voided fine earlier this morning but only 200 mL output. I bladder scanned him myself during encounter with pvr 434. He is being very noncompliant with a escobar at this time. I discussed with son at [facility]. Denies SP pain/pressure  ==  Doing well, continues to void great with low pvrs  Cr WNL    Assessment/Plan:  -Continue flomax, continue finasteride  -Hold off on escobar, need to monitor for symptomatic retention - bladder scan as needed  -Urology will sign off call with questions    The patient had a chance to ask questions which were answered. he understands the above plan. Subjective:   Phares Mcburney is a 80 y.o. male. He was seen and examined this morning.  Today we discussed continuing flomax      Objective:   Vitals:  Vitals:    23 2248   BP: (!) 159/82   Pulse:    Resp:    Temp:    SpO2:        Intake/Output Summary (Last 24 hours) at 11/10/2023 1028  Last data filed at 11/10/2023 1802  Gross per 24 hour   Intake --   Output 1400 ml   Net -1400 ml     Physical Exam:  Gen: Alert and oriented x3, no acute distress  CV: Regular rate   Resp: unlabored respirations  Abd: Soft, non-distended, non-tender, no masses  Ext: no

## 2023-11-10 NOTE — PLAN OF CARE
Problem: Discharge Planning  Goal: Discharge to home or other facility with appropriate resources  Outcome: Progressing     Problem: Safety - Adult  Goal: Free from fall injury  Outcome: Progressing     Problem: Pain  Goal: Verbalizes/displays adequate comfort level or baseline comfort level  Outcome: Progressing     Problem: ABCDS Injury Assessment  Goal: Absence of physical injury  Outcome: Progressing     Problem: Skin/Tissue Integrity  Goal: Absence of new skin breakdown  Description: 1. Monitor for areas of redness and/or skin breakdown  2. Assess vascular access sites hourly  3. Every 4-6 hours minimum:  Change oxygen saturation probe site  4. Every 4-6 hours:  If on nasal continuous positive airway pressure, respiratory therapy assess nares and determine need for appliance change or resting period. Outcome: Progressing     Problem: Confusion  Goal: Confusion, delirium, dementia, or psychosis is improved or at baseline  Description: INTERVENTIONS:  1. Assess for possible contributors to thought disturbance, including medications, impaired vision or hearing, underlying metabolic abnormalities, dehydration, psychiatric diagnoses, and notify attending LIP  2. [place] high risk fall precautions, as indicated  3. Provide frequent short contacts to provide reality reorientation, refocusing and direction  4. Decrease environmental stimuli, including noise as appropriate  5. Monitor and intervene to maintain adequate nutrition, hydration, elimination, sleep and activity  6. If unable to ensure safety without constant attention obtain sitter and review sitter guidelines with assigned personnel  7.  Initiate Psychosocial CNS and Spiritual Care consult, as indicated  Outcome: Progressing

## 2023-11-10 NOTE — PROGRESS NOTES
Facility/Department: [address] NURSING  Speech Language Pathology   Dysphagia Treatment Note    Patient: Padmini Jack   : 1925   MRN: [id number]      Evaluation Date: 11/10/2023      Admitting Dx: Hypoxemia [R09.02]  Pneumonia, bacterial [J15.9]  Frequent falls [R29.6]  Pneumonia due to infectious organism, unspecified laterality, unspecified part of lung [J18.9]  Treatment Diagnosis: Oropharyngeal Dysphagia   Pain: Did not state                                              Diet and Treatment Recommendations 11/10/2023:  Diet Solids Recommendation:  Regular texture diet  Liquid Consistency Recommendation: Thin liquids, No straws  Recommended form of Meds: Meds whole with water        Compensatory strategies: Alternate solids/liquids , Upright as possible with all PO intake , Small bites/sips , Eat/feed slowly, Remain upright 30-45 min     Assessment of Texture Tolerance:  Diet level prior to treatment: Regular texture diet , Thin liquids, No straws   Tolerance of Current Diet Level:Per chart, no noted difficulty with current diet level     Impressions: Pt was positioned Upright in chair, awake and alert. Currently on  2L O2 via nasal cannula . Trials of thin liquids and regular solids  were provided to assess swallow function. Pt demonstrated mildly prolonged mastication although effective oral clearing with regular solids. Suspect premature bolus loss to pharynx and a mildly delayed swallow initiation with thin liquids. Delayed cough noted x 1 following thin liquids utilized as a liquid wash with regular solids. No further overt clinical s/s of aspiration were assessed. Ongoing education was provided regarding use of safe swallowing strategies. Pt and pt daughter v/u. Overall, Pt demonstrates increased risk for aspiration due to co morbidities , deconditioning , respiratory status , and prior hx of dysphagia .  Based on today's assessment recommend Regular texture diet  with Thin liquids, No straws ,

## 2023-11-10 NOTE — PROGRESS NOTES
Patients head to toe assessment completed. Vital signs WNL except elevated BP, scheduled Amlodipine. chair alarm engaged, call light within reach. Scheduled medications given per MAR. Patient denies any pain at the moment. Patient up in a chair. Will continue to monitor.

## 2023-11-10 NOTE — CARE COORDINATION
11/10/23 1654   IMM Letter   IMM Letter given to Patient/Family/Significant other/Guardian/POA/by: LETTER GIVEN TO Nisha Patterson RN.  PT IS AGREEABLE TO RECIEVING THE LETTER LESS THAN 4 HRS FROM D/C   IMM Letter date given: 11/10/23   IMM Letter time given: 1655     Electronically signed by Norris Moreira RN on 11/10/2023 at 4:55 PM

## 2023-11-10 NOTE — PLAN OF CARE
Pt discharging to [facility]. Family to transport. Problem: Discharge Planning  Goal: Discharge to home or other facility with appropriate resources  Outcome: Adequate for Discharge     Problem: Safety - Adult  Goal: Free from fall injury  Outcome: Adequate for Discharge     Problem: Pain  Goal: Verbalizes/displays adequate comfort level or baseline comfort level  Outcome: Adequate for Discharge     Problem: ABCDS Injury Assessment  Goal: Absence of physical injury  Outcome: Adequate for Discharge     Problem: Skin/Tissue Integrity  Goal: Absence of new skin breakdown  Description: 1. Monitor for areas of redness and/or skin breakdown  2. Assess vascular access sites hourly  3. Every 4-6 hours minimum:  Change oxygen saturation probe site  4. Every 4-6 hours:  If on nasal continuous positive airway pressure, respiratory therapy assess nares and determine need for appliance change or resting period. Outcome: Adequate for Discharge     Problem: Confusion  Goal: Confusion, delirium, dementia, or psychosis is improved or at baseline  Description: INTERVENTIONS:  1. Assess for possible contributors to thought disturbance, including medications, impaired vision or hearing, underlying metabolic abnormalities, dehydration, psychiatric diagnoses, and notify attending LIP  2. [place] high risk fall precautions, as indicated  3. Provide frequent short contacts to provide reality reorientation, refocusing and direction  4. Decrease environmental stimuli, including noise as appropriate  5. Monitor and intervene to maintain adequate nutrition, hydration, elimination, sleep and activity  6. If unable to ensure safety without constant attention obtain sitter and review sitter guidelines with assigned personnel  7.  Initiate Psychosocial CNS and Spiritual Care consult, as indicated  Outcome: Adequate for Discharge

## 2023-11-10 NOTE — DISCHARGE SUMMARY
Report given to [place], LPN @ [facility]. IV and tele removed. Personal items gathered by family. Paperwork/RONY printed out and given to family. Placed in transport to go to lobby via wheelchair.

## 2023-11-10 NOTE — CARE COORDINATION
Accepted at [facility] [phone] states that they can accept the patient today for admission. [address]  [address] [facility] [address] [facility]  Report: [phone]  Fax: [phone]    Set up discharge.     Electronically signed by Nadja Solorzano RN on 11/10/2023 at 1:56 PM

## 2023-11-10 NOTE — CARE COORDINATION
Accepted at [facility] [id number])    [facility]  [address], [facility] [address]  Report: [phone]  Fax: [phone]    Pre-cert started   Needs CPAP orders  IV anti-biotics    Electronically signed by Adeel Lindsay RN on 11/10/2023 at 9:19 AM

## 2023-11-10 NOTE — CARE COORDINATION
Patient noted to have a discharge order.   Pt [facility] [address]    Patient discharged to   [facility]  [address], [address] [place], [place] [address]  Report: [phone]  Fax: [phone]    HENS Completed:  ***    Pre-cert required/obtained:  yes    Transportation scheduled for 18:00 pm    Transportation provided by  Angelica KAM faxed and agency notified:  mira  [phone] [facility])    The following prescriptions sent with pt: yes    Family Notified:  Oliva Ibrahim     Nurse to call report to facility [phone]

## 2023-11-10 NOTE — PROGRESS NOTES
use of UE support  Dynamic Standing Balance: fair (-): maintains balance at CGA with use of UE support  Comments: RW in standing and walking. Other Therapeutic Interventions  Pt performed seated there ex including marching, LAQs, HR/TR 2x10 each, hip adduction squeeze with pillow x 10. Pt vitals monitored during treatment. Positioned din chair for postural support and comfort with needs placed in reach. Functional Outcomes  AM-PAC Inpatient Mobility Raw Score : 17              Cognition  Overall Cognitive Status: Impaired  Memory: decreased recall of recent events, decreased short term memory  Safety Judgement: decreased awareness of need for assistance, decreased awareness of need for safety  Insights: decreased awareness of deficits  Orientation:    alert and oriented x 4  Command Following:   [facility]    Education  Barriers To Learning: hearing  Patient Education: patient educated on goals, PT role and benefits, plan of care, transfer training, discharge recommendations  Learning Assessment:  patient verbalizes and demonstrates understanding    Assessment  Activity Tolerance: limited by fatigue  Impairments Requiring Therapeutic Intervention: decreased functional mobility, decreased safety awareness, decreased endurance, decreased balance  Prognosis: good  Clinical Assessment: Pt SOB with activity. Patient not at baseline function and would benefit from skilled PT to address above deficits and facilitate return to baseline function. Patient demonstrating decreased safety awareness and decreased balance with CGA needed to correct balance with turning.      Safety Interventions: patient left in chair, chair alarm in place, call light within reach, gait belt, patient at risk for falls, nurse notified, and family/caregiver present - daughter    Plan  Frequency: 3-5 x/per week  Current Treatment Recommendations: strengthening, balance training, functional mobility training, transfer training, gait training, endurance training, home exercise program, and safety education    Goals  Patient Goals: did not state   Short Term Goals:  Time Frame: discharge  Patient will complete bed mobility at stand by assistance   Patient will complete transfers at stand by assistance   Patient will ambulate 50 ft with use of rolling walker at contact guard assistance - Goal met 11/10  Pt to perform ambulation with RW and supervision. Above goals reviewed on 11/10/2023. All goals are ongoing at this time unless indicated above.       Therapy Session Time      Individual Group Co-treatment   Time In 4801       Time Out 8262       Minutes 34         Timed Code Treatment Minutes:  34 Minutes  Total Treatment Minutes:  34 minutes       Electronically Signed By: Nahun Sams [id number]

## 2023-11-13 NOTE — PROGRESS NOTES
Physician Progress Note      Duane Joseph  [facility] #:                  [id number]  :                       1925  ADMIT DATE:       2023 9:44 AM  DISCH DATE:        11/10/2023 6:44 PM  RESPONDING  PROVIDER #:        Max De Jesus MD          QUERY TEXT:    Patient admitted with pneumonia . Noted documentation of sepsis in h&p and pn. In order to support the diagnosis of sepsis, please include additional   clinical indicators in your documentation. Or please document if the   diagnosis of sepsis has been ruled out after further study    The medical record reflects the following:  Risk Factors: pneumonia, serge  Clinical Indicators: Documentation per H&P of sepsis. On admission temp 97.6   and has remained afebrile, wbc 11.6-10.4,  lactic acid-2.2,  procal-2.97,   crp-115.3  Treatment: ivf, tylenol, iv zithromax, iv rocephin, monitor labs    Thank you, Hector Walters RN CDS CCDS  [email or web address]  Options provided:  -- Sepsis present as evidenced by, Please document evidence. -- Sepsis was ruled out after study  -- Other - I will add my own diagnosis  -- Disagree - Not applicable / Not valid  -- Disagree - Clinically unable to determine / Unknown  -- Refer to Clinical Documentation Reviewer    PROVIDER RESPONSE TEXT:    Sepsis was ruled out after study. Query created by: Jules Walters on 2023 9:13 AM      QUERY TEXT:    Patient admitted with pneumonia . Noted documentation of acute respiratory   failure in h&p. In order to support the diagnosis of acute respiratory   failure, please include additional clinical indicators in your documentation. Or please document if the diagnosis of acute respiratory failure has been   ruled out after further study. The medical record reflects the following:  Risk Factors: dementia, pneumonia, chronic resp failure  Clinical Indicators: Documentation per ED of O2 sats mid 80s on room air per   EMS.   Effort: Pulmonary effort is normal. No

## 2024-01-28 ENCOUNTER — APPOINTMENT (OUTPATIENT)
Dept: CT IMAGING | Age: 89
End: 2024-01-28
Payer: MEDICARE

## 2024-01-28 ENCOUNTER — HOSPITAL ENCOUNTER (EMERGENCY)
Age: 89
Discharge: HOME | End: 2024-01-29
Payer: MEDICARE

## 2024-01-28 VITALS
OXYGEN SATURATION: 96 % | TEMPERATURE: 98.5 F | WEIGHT: 220 LBS | SYSTOLIC BLOOD PRESSURE: 174 MMHG | BODY MASS INDEX: 31.5 KG/M2 | DIASTOLIC BLOOD PRESSURE: 69 MMHG | HEART RATE: 71 BPM | RESPIRATION RATE: 14 BRPM | HEIGHT: 70 IN

## 2024-01-28 DIAGNOSIS — S32.039A CLOSED FRACTURE OF THIRD LUMBAR VERTEBRA, UNSPECIFIED FRACTURE MORPHOLOGY, INITIAL ENCOUNTER (HCC): ICD-10-CM

## 2024-01-28 DIAGNOSIS — S01.01XA LACERATION OF SCALP, INITIAL ENCOUNTER: ICD-10-CM

## 2024-01-28 DIAGNOSIS — Z23 TETANUS TOXOID VACCINATION ADMINISTERED AT CURRENT VISIT: ICD-10-CM
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
## 2024-01-28 LAB
ALBUMIN SERPL-MCNC: 3.9 G/DL (ref 3.4–5)
ALBUMIN/GLOB SERPL: 1.2 {RATIO} (ref 1.1–2.2)
ALP SERPL-CCNC: 71 U/L (ref 40–129)
ALT SERPL-CCNC: 14 U/L (ref 10–40)
ANION GAP SERPL CALCULATED.3IONS-SCNC: 13 MMOL/L (ref 3–16)
AST SERPL-CCNC: 19 U/L (ref 15–37)
BACTERIA URNS QL MICRO: NORMAL /HPF
BASOPHILS # BLD: 0 K/UL (ref 0–0.2)
BASOPHILS NFR BLD: 0.6 %
BILIRUB SERPL-MCNC: 0.3 MG/DL (ref 0–1)
BILIRUB UR QL STRIP.AUTO: NEGATIVE
BUN SERPL-MCNC: 18 MG/DL (ref 7–20)
CALCIUM SERPL-MCNC: 9.5 MG/DL (ref 8.3–10.6)
CHLORIDE SERPL-SCNC: 102 MMOL/L (ref 99–110)
CLARITY UR: CLEAR
CO2 SERPL-SCNC: 24 MMOL/L (ref 21–32)
COLOR UR: YELLOW
CREAT SERPL-MCNC: 1 MG/DL (ref 0.8–1.3)
DEPRECATED RDW RBC AUTO: 14.8 % (ref 12.4–15.4)
EOSINOPHIL # BLD: 0.1 K/UL (ref 0–0.6)
EOSINOPHIL NFR BLD: 1.1 %
EPI CELLS #/AREA URNS AUTO: 5 /HPF (ref 0–5)
GFR SERPLBLD CREATININE-BSD FMLA CKD-EPI: >60 ML/MIN/{1.73_M2}
GLUCOSE SERPL-MCNC: 158 MG/DL (ref 70–99)
GLUCOSE UR STRIP.AUTO-MCNC: NEGATIVE MG/DL
HCT VFR BLD AUTO: 44 % (ref 40.5–52.5)
HGB BLD-MCNC: 14.8 G/DL (ref 13.5–17.5)
HGB UR QL STRIP.AUTO: NEGATIVE
HYALINE CASTS #/AREA URNS AUTO: 3 /LPF (ref 0–8)
KETONES UR STRIP.AUTO-MCNC: NEGATIVE MG/DL
LEUKOCYTE ESTERASE UR QL STRIP.AUTO: NEGATIVE
LYMPHOCYTES # BLD: 2 K/UL (ref 1–5.1)
LYMPHOCYTES NFR BLD: 32 %
MCH RBC QN AUTO: 31.1 PG (ref 26–34)
MCHC RBC AUTO-ENTMCNC: 33.8 G/DL (ref 31–36)
MCV RBC AUTO: 92 FL (ref 80–100)
MONOCYTES # BLD: 0.5 K/UL (ref 0–1.3)
MONOCYTES NFR BLD: 8.3 %
NEUTROPHILS # BLD: 3.6 K/UL (ref 1.7–7.7)
NEUTROPHILS NFR BLD: 58 %
NITRITE UR QL STRIP.AUTO: NEGATIVE
NT-PROBNP SERPL-MCNC: 380 PG/ML (ref 0–449)
PH UR STRIP.AUTO: 5.5 [PH] (ref 5–8)
PLATELET # BLD AUTO: 226 K/UL (ref 135–450)
PMV BLD AUTO: 8.5 FL (ref 5–10.5)
POTASSIUM SERPL-SCNC: 4 MMOL/L (ref 3.5–5.1)
PROT SERPL-MCNC: 7.2 G/DL (ref 6.4–8.2)
PROT UR STRIP.AUTO-MCNC: 30 MG/DL
RBC # BLD AUTO: 4.78 M/UL (ref 4.2–5.9)
RBC CLUMPS #/AREA URNS AUTO: 1 /HPF (ref 0–4)
SODIUM SERPL-SCNC: 139 MMOL/L (ref 136–145)
SP GR UR STRIP.AUTO: >=1.03 (ref 1–1.03)
TROPONIN, HIGH SENSITIVITY: 31 NG/L (ref 0–22)
TROPONIN, HIGH SENSITIVITY: 34 NG/L (ref 0–22)
UA COMPLETE W REFLEX CULTURE PNL UR: ABNORMAL
UA DIPSTICK W REFLEX MICRO PNL UR: YES
URN SPEC COLLECT METH UR: ABNORMAL
UROBILINOGEN UR STRIP-ACNC: 1 E.U./DL
WBC # BLD AUTO: 6.2 K/UL (ref 4–11)
WBC #/AREA URNS AUTO: 2 /HPF (ref 0–5)

## 2024-01-28 PROCEDURE — 71260 CT THORAX DX C+: CPT

## 2024-01-28 PROCEDURE — 6370000000 HC RX 637 (ALT 250 FOR IP): Performed by: PHYSICIAN ASSISTANT

## 2024-01-28 PROCEDURE — 6360000002 HC RX W HCPCS: Performed by: PHYSICIAN ASSISTANT

## 2024-01-28 PROCEDURE — 90471 IMMUNIZATION ADMIN: CPT | Performed by: PHYSICIAN ASSISTANT

## 2024-01-28 PROCEDURE — 99285 EMERGENCY DEPT VISIT HI MDM: CPT

## 2024-01-28 PROCEDURE — 93005 ELECTROCARDIOGRAM TRACING: CPT | Performed by: PHYSICIAN ASSISTANT

## 2024-01-28 PROCEDURE — 90714 TD VACC NO PRESV 7 YRS+ IM: CPT | Performed by: PHYSICIAN ASSISTANT

## 2024-01-28 PROCEDURE — 85025 COMPLETE CBC W/AUTO DIFF WBC: CPT

## 2024-01-28 PROCEDURE — 12001 RPR S/N/AX/GEN/TRNK 2.5CM/<: CPT

## 2024-01-28 PROCEDURE — 6360000004 HC RX CONTRAST MEDICATION: Performed by: PHYSICIAN ASSISTANT

## 2024-01-28 PROCEDURE — 84484 ASSAY OF TROPONIN QUANT: CPT

## 2024-01-28 PROCEDURE — 83880 ASSAY OF NATRIURETIC PEPTIDE: CPT

## 2024-01-28 PROCEDURE — 70450 CT HEAD/BRAIN W/O DYE: CPT

## 2024-01-28 PROCEDURE — 80053 COMPREHEN METABOLIC PANEL: CPT

## 2024-01-28 PROCEDURE — 36415 COLL VENOUS BLD VENIPUNCTURE: CPT

## 2024-01-28 PROCEDURE — 81001 URINALYSIS AUTO W/SCOPE: CPT

## 2024-01-28 PROCEDURE — 72125 CT NECK SPINE W/O DYE: CPT

## 2024-01-28 RX ADMIN — Medication 3 ML: at 20:19

## 2024-01-28 RX ADMIN — IOPAMIDOL 75 ML: 755 INJECTION, SOLUTION INTRAVENOUS at 21:45

## 2024-01-28 RX ADMIN — CLOSTRIDIUM TETANI TOXOID ANTIGEN (FORMALDEHYDE INACTIVATED) AND CORYNEBACTERIUM DIPHTHERIAE TOXOID ANTIGEN (FORMALDEHYDE INACTIVATED) 0.5 ML: 5; 2 INJECTION, SUSPENSION INTRAMUSCULAR at 20:30

## 2024-01-28 ASSESSMENT — PAIN DESCRIPTION - LOCATION: LOCATION: HEAD

## 2024-01-28 ASSESSMENT — PAIN SCALES - GENERAL: PAINLEVEL_OUTOF10: 2

## 2024-01-28 ASSESSMENT — PAIN DESCRIPTION - ORIENTATION: ORIENTATION: POSTERIOR

## 2024-01-29 LAB
EKG ATRIAL RATE: 147 BPM
EKG DIAGNOSIS: NORMAL
EKG P AXIS: 8 DEGREES
EKG P-R INTERVAL: 278 MS
EKG Q-T INTERVAL: 396 MS
EKG QRS DURATION: 122 MS
EKG QTC CALCULATION (BAZETT): 433 MS
EKG R AXIS: -48 DEGREES
EKG T AXIS: 45 DEGREES
EKG VENTRICULAR RATE: 72 BPM

## 2024-01-29 PROCEDURE — 93010 ELECTROCARDIOGRAM REPORT: CPT | Performed by: INTERNAL MEDICINE

## 2024-01-29 NOTE — ED NOTES
Patient oriented to room and ED throughput process.  Safety measures with ED bed locked in lowest position and call light in reach.  Patient educated on all orders, including any medications.  Patient educated on chief complaint/symptoms. Patient encouraged to ask questions regarding care, medications or treatment plan.  Patient aware of how to reach staff with questions/concerns.    RN attempted to get report from [facility], but unable to get report from unit on report about Pt and medical status.

## 2024-01-29 NOTE — ED NOTES
Report given to Dwayne ROBLEDO RN, all questions answered during handoff report, VSS at this time.

## 2024-01-29 NOTE — ED PROVIDER NOTES
EKG interpretation by me in absence of a face-to-face evaluation by me as follows:    The 12 lead EKG was interpreted by me independent of a cardiologist as follows:  Rate: normal with a rate of 72  Rhythm: artifact, undetermined, poss afib  Intervals: LAFB, LVH with QRS widening  ST segments: no ST elevations or depressions  T waves: no abnormal inversions  Non-specific T wave changes: present  Prior EKG comparison: EKG dated 11/7/23 is not significantly different        Barber Brewster MD  01/28/24 7281    
(PSA), Esophageal reflux, Fracture closed, humerus (12/09/2009), GERD (gastroesophageal reflux disease), HTN (hypertension), Hypercapnia, Hypercapnia, Hyperlipidemia, Lateral epicondylitis, Major depressive disorder, recurrent (HCC), Olecranon bursitis, unspecified laterality, Osteoarthritis, Other abnormalities of gait and mobility, Other lack of coordination, Peripheral neuropathy, Peripheral neuropathy, PNA (pneumonia), Polyneuropathy, Retention of urine, Type II or unspecified type diabetes mellitus without mention of complication, not stated as uncontrolled, Unsteadiness on feet, Vitamin deficiency, and Weakness.    CONSULTS: (Who and What was discussed)  None      Social Determinants Significantly Affecting Health : None    Records Reviewed (External and Source) None    CC/HPI Summary, DDx, ED Course, and Reassessment: Patient is a 89-year-old male who presents ED with complaint of a fall.  He had a fall earlier today brought to the ED.  He has scalp laceration.  Unknown last tetanus vaccine and tetanus was updated here in the emergency department.  Wound was anesthetized and repaired by myself.  Patient tolerated procedure well.  4 staples placed.  Instruct on proper wound care and safe removal in 12 to 14 days.  Urinalysis without infection.  Troponin was 34 and actually proved from previously documented results.  Troponin downtrending at 31 and was patient for ACS.  EKG inter by attending.  CBC with no white count, hemoglobin platelet.  CMP unremarkable.  BNP unremarkable.  CT thoracic spine and cervical spine unremarkable.  CT of the head unremarkable.  CT lumbar spine showed minimally displaced L3 transverse process fracture.  CT of the chest abdomen pelvis showed no acute abnormality.  His oxygen upon arrival noted to be 90 to 92% on room air.  Family reports this is normal for him.  He has longstanding history of COPD.  Apparently he wears supplemental oxygen with 2 L nasal cannula as needed.  Patient

## 2024-11-06 ENCOUNTER — HOSPITAL ENCOUNTER (INPATIENT)
Age: 89
LOS: 4 days | Discharge: NURSING, REHAB OR LONG-TERM CARE | DRG: 193 | End: 2024-11-10
Attending: EMERGENCY MEDICINE | Admitting: INTERNAL MEDICINE
Payer: MEDICARE

## 2024-11-06 ENCOUNTER — APPOINTMENT (OUTPATIENT)
Dept: GENERAL RADIOLOGY | Age: 89
DRG: 193 | End: 2024-11-06
Payer: MEDICARE

## 2024-11-06 ENCOUNTER — APPOINTMENT (OUTPATIENT)
Dept: CT IMAGING | Age: 89
DRG: 193 | End: 2024-11-06
Payer: MEDICARE

## 2024-11-06 DIAGNOSIS — R09.02 HYPOXEMIA: ICD-10-CM

## 2024-11-06 DIAGNOSIS — R41.82 ALTERED MENTAL STATUS, UNSPECIFIED ALTERED MENTAL STATUS TYPE: ICD-10-CM

## 2024-11-06 DIAGNOSIS — A41.9 SEPTICEMIA (HCC): ICD-10-CM

## 2024-11-06 DIAGNOSIS — J18.9 MULTIFOCAL PNEUMONIA: Primary | ICD-10-CM

## 2024-11-06 LAB
ALBUMIN SERPL-MCNC: 4.3 G/DL (ref 3.4–5)
ALBUMIN/GLOB SERPL: 1.5 {RATIO} (ref 1.1–2.2)
ALP SERPL-CCNC: 65 U/L (ref 40–129)
ALT SERPL-CCNC: 16 U/L (ref 10–40)
ANION GAP SERPL CALCULATED.3IONS-SCNC: 10 MMOL/L (ref 3–16)
AST SERPL-CCNC: 20 U/L (ref 15–37)
BACTERIA URNS QL MICRO: NORMAL /HPF
BASOPHILS # BLD: 0 K/UL (ref 0–0.2)
BASOPHILS NFR BLD: 0.4 %
BILIRUB SERPL-MCNC: 0.7 MG/DL (ref 0–1)
BILIRUB UR QL STRIP.AUTO: NEGATIVE
BUN SERPL-MCNC: 9 MG/DL (ref 7–20)
CALCIUM SERPL-MCNC: 9.5 MG/DL (ref 8.3–10.6)
CHLORIDE SERPL-SCNC: 102 MMOL/L (ref 99–110)
CLARITY UR: CLEAR
CO2 SERPL-SCNC: 28 MMOL/L (ref 21–32)
COLOR UR: YELLOW
CREAT SERPL-MCNC: 1 MG/DL (ref 0.8–1.3)
DEPRECATED RDW RBC AUTO: 13.6 % (ref 12.4–15.4)
EKG DIAGNOSIS: NORMAL
EKG Q-T INTERVAL: 392 MS
EKG QRS DURATION: 152 MS
EKG QTC CALCULATION (BAZETT): 463 MS
EKG R AXIS: -45 DEGREES
EKG T AXIS: 3 DEGREES
EKG VENTRICULAR RATE: 84 BPM
EOSINOPHIL # BLD: 0 K/UL (ref 0–0.6)
EOSINOPHIL NFR BLD: 0.1 %
EPI CELLS #/AREA URNS AUTO: 2 /HPF (ref 0–5)
FLUAV RNA RESP QL NAA+PROBE: NOT DETECTED
FLUBV RNA RESP QL NAA+PROBE: NOT DETECTED
GFR SERPLBLD CREATININE-BSD FMLA CKD-EPI: 68 ML/MIN/{1.73_M2}
GLUCOSE BLD-MCNC: 180 MG/DL (ref 70–99)
GLUCOSE SERPL-MCNC: 181 MG/DL (ref 70–99)
GLUCOSE UR STRIP.AUTO-MCNC: NEGATIVE MG/DL
HCT VFR BLD AUTO: 45 % (ref 40.5–52.5)
HGB BLD-MCNC: 15.3 G/DL (ref 13.5–17.5)
HGB UR QL STRIP.AUTO: NEGATIVE
HYALINE CASTS #/AREA URNS AUTO: 0 /LPF (ref 0–8)
KETONES UR STRIP.AUTO-MCNC: NEGATIVE MG/DL
LACTATE BLDV-SCNC: 1.5 MMOL/L (ref 0.4–1.9)
LEUKOCYTE ESTERASE UR QL STRIP.AUTO: NEGATIVE
LYMPHOCYTES # BLD: 0.9 K/UL (ref 1–5.1)
LYMPHOCYTES NFR BLD: 7 %
MCH RBC QN AUTO: 32.5 PG (ref 26–34)
MCHC RBC AUTO-ENTMCNC: 34 G/DL (ref 31–36)
MCV RBC AUTO: 95.5 FL (ref 80–100)
MONOCYTES # BLD: 0.6 K/UL (ref 0–1.3)
MONOCYTES NFR BLD: 5.1 %
NEUTROPHILS # BLD: 11 K/UL (ref 1.7–7.7)
NEUTROPHILS NFR BLD: 87.4 %
NITRITE UR QL STRIP.AUTO: NEGATIVE
PERFORMED ON: ABNORMAL
PH UR STRIP.AUTO: 6 [PH] (ref 5–8)
PLATELET # BLD AUTO: 171 K/UL (ref 135–450)
PMV BLD AUTO: 8.5 FL (ref 5–10.5)
POTASSIUM SERPL-SCNC: 3.9 MMOL/L (ref 3.5–5.1)
PROCALCITONIN SERPL IA-MCNC: 0.07 NG/ML (ref 0–0.15)
PROT SERPL-MCNC: 7.1 G/DL (ref 6.4–8.2)
PROT UR STRIP.AUTO-MCNC: 30 MG/DL
RBC # BLD AUTO: 4.71 M/UL (ref 4.2–5.9)
RBC CLUMPS #/AREA URNS AUTO: 1 /HPF (ref 0–4)
SARS-COV-2 RNA RESP QL NAA+PROBE: NOT DETECTED
SODIUM SERPL-SCNC: 140 MMOL/L (ref 136–145)
SP GR UR STRIP.AUTO: 1.01 (ref 1–1.03)
TROPONIN, HIGH SENSITIVITY: 38 NG/L (ref 0–22)
TROPONIN, HIGH SENSITIVITY: 42 NG/L (ref 0–22)
UA COMPLETE W REFLEX CULTURE PNL UR: ABNORMAL
UA DIPSTICK W REFLEX MICRO PNL UR: YES
URN SPEC COLLECT METH UR: ABNORMAL
UROBILINOGEN UR STRIP-ACNC: 0.2 E.U./DL
WBC # BLD AUTO: 12.6 K/UL (ref 4–11)
WBC #/AREA URNS AUTO: 0 /HPF (ref 0–5)

## 2024-11-06 PROCEDURE — 6360000002 HC RX W HCPCS: Performed by: EMERGENCY MEDICINE

## 2024-11-06 PROCEDURE — 81001 URINALYSIS AUTO W/SCOPE: CPT

## 2024-11-06 PROCEDURE — 87040 BLOOD CULTURE FOR BACTERIA: CPT

## 2024-11-06 PROCEDURE — 1200000000 HC SEMI PRIVATE

## 2024-11-06 PROCEDURE — 84484 ASSAY OF TROPONIN QUANT: CPT

## 2024-11-06 PROCEDURE — 93010 ELECTROCARDIOGRAM REPORT: CPT | Performed by: INTERNAL MEDICINE

## 2024-11-06 PROCEDURE — 6370000000 HC RX 637 (ALT 250 FOR IP): Performed by: EMERGENCY MEDICINE

## 2024-11-06 PROCEDURE — 2580000003 HC RX 258: Performed by: INTERNAL MEDICINE

## 2024-11-06 PROCEDURE — 6360000004 HC RX CONTRAST MEDICATION: Performed by: EMERGENCY MEDICINE

## 2024-11-06 PROCEDURE — 6370000000 HC RX 637 (ALT 250 FOR IP): Performed by: INTERNAL MEDICINE

## 2024-11-06 PROCEDURE — 2700000000 HC OXYGEN THERAPY PER DAY

## 2024-11-06 PROCEDURE — 83605 ASSAY OF LACTIC ACID: CPT

## 2024-11-06 PROCEDURE — 71045 X-RAY EXAM CHEST 1 VIEW: CPT

## 2024-11-06 PROCEDURE — 85025 COMPLETE CBC W/AUTO DIFF WBC: CPT

## 2024-11-06 PROCEDURE — 99285 EMERGENCY DEPT VISIT HI MDM: CPT

## 2024-11-06 PROCEDURE — 6360000002 HC RX W HCPCS: Performed by: INTERNAL MEDICINE

## 2024-11-06 PROCEDURE — 2580000003 HC RX 258: Performed by: EMERGENCY MEDICINE

## 2024-11-06 PROCEDURE — 87636 SARSCOV2 & INF A&B AMP PRB: CPT

## 2024-11-06 PROCEDURE — 84145 PROCALCITONIN (PCT): CPT

## 2024-11-06 PROCEDURE — 36415 COLL VENOUS BLD VENIPUNCTURE: CPT

## 2024-11-06 PROCEDURE — 94640 AIRWAY INHALATION TREATMENT: CPT

## 2024-11-06 PROCEDURE — 71260 CT THORAX DX C+: CPT

## 2024-11-06 PROCEDURE — 80053 COMPREHEN METABOLIC PANEL: CPT

## 2024-11-06 PROCEDURE — 70450 CT HEAD/BRAIN W/O DYE: CPT

## 2024-11-06 PROCEDURE — 93005 ELECTROCARDIOGRAM TRACING: CPT | Performed by: PHYSICIAN ASSISTANT

## 2024-11-06 RX ORDER — BUDESONIDE AND FORMOTEROL FUMARATE DIHYDRATE 80; 4.5 UG/1; UG/1
2 AEROSOL RESPIRATORY (INHALATION)
Status: DISCONTINUED | OUTPATIENT
Start: 2024-11-06 | End: 2024-11-10 | Stop reason: HOSPADM

## 2024-11-06 RX ORDER — ACETAMINOPHEN 325 MG/1
650 TABLET ORAL ONCE
Status: COMPLETED | OUTPATIENT
Start: 2024-11-06 | End: 2024-11-06

## 2024-11-06 RX ORDER — LOSARTAN POTASSIUM 100 MG/1
100 TABLET ORAL DAILY
Status: DISCONTINUED | OUTPATIENT
Start: 2024-11-07 | End: 2024-11-10 | Stop reason: HOSPADM

## 2024-11-06 RX ORDER — IPRATROPIUM BROMIDE AND ALBUTEROL SULFATE 2.5; .5 MG/3ML; MG/3ML
1 SOLUTION RESPIRATORY (INHALATION) EVERY 4 HOURS PRN
Status: DISCONTINUED | OUTPATIENT
Start: 2024-11-06 | End: 2024-11-10 | Stop reason: HOSPADM

## 2024-11-06 RX ORDER — SODIUM CHLORIDE 9 MG/ML
INJECTION, SOLUTION INTRAVENOUS CONTINUOUS
Status: DISCONTINUED | OUTPATIENT
Start: 2024-11-06 | End: 2024-11-07

## 2024-11-06 RX ORDER — METHOCARBAMOL 500 MG/1
500 TABLET, FILM COATED ORAL EVERY 6 HOURS PRN
Status: DISCONTINUED | OUTPATIENT
Start: 2024-11-06 | End: 2024-11-10 | Stop reason: HOSPADM

## 2024-11-06 RX ORDER — PANTOPRAZOLE SODIUM 40 MG/1
40 TABLET, DELAYED RELEASE ORAL 2 TIMES DAILY
Status: DISCONTINUED | OUTPATIENT
Start: 2024-11-06 | End: 2024-11-10 | Stop reason: HOSPADM

## 2024-11-06 RX ORDER — MULTIVITAMIN WITH IRON
1 TABLET ORAL DAILY
Status: DISCONTINUED | OUTPATIENT
Start: 2024-11-06 | End: 2024-11-10 | Stop reason: HOSPADM

## 2024-11-06 RX ORDER — ENOXAPARIN SODIUM 100 MG/ML
30 INJECTION SUBCUTANEOUS 2 TIMES DAILY
Status: DISCONTINUED | OUTPATIENT
Start: 2024-11-06 | End: 2024-11-10 | Stop reason: HOSPADM

## 2024-11-06 RX ORDER — FINASTERIDE 5 MG/1
5 TABLET, FILM COATED ORAL DAILY
Status: DISCONTINUED | OUTPATIENT
Start: 2024-11-06 | End: 2024-11-10 | Stop reason: HOSPADM

## 2024-11-06 RX ORDER — IBUPROFEN 400 MG/1
400 TABLET, FILM COATED ORAL ONCE
Status: COMPLETED | OUTPATIENT
Start: 2024-11-06 | End: 2024-11-06

## 2024-11-06 RX ORDER — VITAMIN B COMPLEX
2000 TABLET ORAL DAILY
Status: DISCONTINUED | OUTPATIENT
Start: 2024-11-06 | End: 2024-11-10 | Stop reason: HOSPADM

## 2024-11-06 RX ORDER — 0.9 % SODIUM CHLORIDE 0.9 %
30 INTRAVENOUS SOLUTION INTRAVENOUS ONCE
Status: COMPLETED | OUTPATIENT
Start: 2024-11-06 | End: 2024-11-06

## 2024-11-06 RX ORDER — AMLODIPINE BESYLATE 5 MG/1
5 TABLET ORAL DAILY
Status: DISCONTINUED | OUTPATIENT
Start: 2024-11-06 | End: 2024-11-07

## 2024-11-06 RX ORDER — ACETAMINOPHEN 500 MG
500 TABLET ORAL EVERY 4 HOURS PRN
Status: DISCONTINUED | OUTPATIENT
Start: 2024-11-06 | End: 2024-11-10 | Stop reason: HOSPADM

## 2024-11-06 RX ORDER — GABAPENTIN 300 MG/1
600 CAPSULE ORAL DAILY
Status: DISCONTINUED | OUTPATIENT
Start: 2024-11-06 | End: 2024-11-10 | Stop reason: HOSPADM

## 2024-11-06 RX ADMIN — FINASTERIDE 5 MG: 5 TABLET, FILM COATED ORAL at 17:36

## 2024-11-06 RX ADMIN — IBUPROFEN 400 MG: 400 TABLET, FILM COATED ORAL at 12:00

## 2024-11-06 RX ADMIN — IOHEXOL 75 ML: 350 INJECTION, SOLUTION INTRAVENOUS at 12:37

## 2024-11-06 RX ADMIN — ACETAMINOPHEN 650 MG: 325 TABLET ORAL at 12:00

## 2024-11-06 RX ADMIN — SODIUM CHLORIDE: 9 INJECTION, SOLUTION INTRAVENOUS at 17:57

## 2024-11-06 RX ADMIN — Medication 2000 UNITS: at 17:36

## 2024-11-06 RX ADMIN — PANTOPRAZOLE SODIUM 40 MG: 40 TABLET, DELAYED RELEASE ORAL at 21:39

## 2024-11-06 RX ADMIN — ACETAMINOPHEN 500 MG: 500 TABLET ORAL at 17:35

## 2024-11-06 RX ADMIN — THERA TABS 1 TABLET: TAB at 17:36

## 2024-11-06 RX ADMIN — GABAPENTIN 600 MG: 300 CAPSULE ORAL at 17:35

## 2024-11-06 RX ADMIN — AMLODIPINE BESYLATE 5 MG: 5 TABLET ORAL at 17:36

## 2024-11-06 RX ADMIN — AZITHROMYCIN MONOHYDRATE 500 MG: 500 INJECTION, POWDER, LYOPHILIZED, FOR SOLUTION INTRAVENOUS at 13:47

## 2024-11-06 RX ADMIN — PIPERACILLIN SODIUM AND TAZOBACTAM SODIUM 3375 MG: 3; .375 INJECTION, SOLUTION INTRAVENOUS at 17:56

## 2024-11-06 RX ADMIN — SODIUM CHLORIDE 2190 ML: 9 INJECTION, SOLUTION INTRAVENOUS at 11:59

## 2024-11-06 RX ADMIN — Medication 2 PUFF: at 20:44

## 2024-11-06 RX ADMIN — ENOXAPARIN SODIUM 30 MG: 100 INJECTION SUBCUTANEOUS at 21:38

## 2024-11-06 RX ADMIN — WATER 1000 MG: 1 INJECTION INTRAMUSCULAR; INTRAVENOUS; SUBCUTANEOUS at 12:00

## 2024-11-06 ASSESSMENT — PAIN SCALES - GENERAL
PAINLEVEL_OUTOF10: 0

## 2024-11-06 NOTE — ED PROVIDER NOTES
Emergency Department Encounter    Patient: Jr Brown  MRN: [id number]  : 1925  Date of Evaluation: 2024  ED Provider:  Agustina Simpson DO    Triage Chief Complaint:   Altered Mental Status (Pt brought to the hospital by [facility] from [facility] due to AMS, Pt fell on , but was not evaluated.  Pt states that he is not having any pain, but does have bruising to his left eye.  Pt denies any pain at this time, but does not get his age right.  )    Knik:  Jr Brown is a 89 y.o. male with history of diabetes, respiratory failure hypoxia COPD congestive heart failure peripheral neuropathy acid reflux hypertension pneumonia arthritis depression hyperlipidemia that presents to the emergency department via EMS from the [facility] due to altered mental status.  Per EMS patient not acting right per nursing home staff.  Also they state patient fell on  but was not evaluated.  On arrival to the emergency department.  Patient appears in no acute distress.  He is alert and oriented to self but is unable to give birthday, month or year or age.  Patient is answering no to all questions.  He denies chest pain shortness of nausea vomiting diarrhea abdominal pain fever chills no headache not dizzy lightheaded denies any cough sore throat runny nose dysuria hematuria.  Patient here for evaluation.    ROS - see HPI, below listed is current ROS at time of my eval:  10 systems reviewed and negative except as above.     Past Medical History:   Diagnosis Date    Anxiety     Aspiration pneumonia (HCC)     BPH     BPH (benign prostatic hyperplasia)     Chronic respiratory failure with hypoxia     Cognitive communication deficit     Constipation     Dementia (HCC)     Disorder of prostate, unspecified     Dysphagia     Elevated prostate specific antigen (PSA)     Esophageal reflux     Fracture closed, humerus 2009    Shoulder    GERD (gastroesophageal reflux

## 2024-11-06 NOTE — ED NOTES
Diet tray ordered for Pt at this time.  
How does patient ambulate?   []Low Fall Risk (ambulates by themselves without support)  []Stand by assist   [x]Contact Guard   [x]Front wheel walker  []Wheelchair   []Steady  []Bed bound  []History of Lower Extremity Amputation  []Unknown, did not assess in the emergency department   How does patient take pills?  [x]Whole with Water  []Crushed in applesauce  []Crushed in pudding  []Other  []Unknown no oral medications were given in the ED  Is patient alert?   [x]Alert  []Drowsy but responds to voice  []Doesn't respond to voice but responds to painful stimuli  []Unresponsive  Is patient oriented?   [x]To person  [x]To place  []To time  []To situation  [x]Confused  []Agitated  [x]Follows commands  If patient is disoriented or from a Skill Nursing Facility has family been notified of admission?   [x]Yes   []No  Patient belongings?   []Cell phone  []Wallet   []Dentures  [x]Clothing  Any specific patient or family belongings/needs/dynamics?   None.  Miscellaneous comments/pending orders?  None.     If there are any additional questions please reach out to the Emergency Department.         
Pt states that he does not want to be admitted to the hospital, RN emphasized with the Pt, but informed him that he has a treatable condition, and if he was to leave he would be returned to the ER.  
Pt transported to  via bed on portable monitor, VSS prior to departure from [facility] [place].  
Report given to Jennifer DAHL, all questions answered during handoff report, VSS at this time for the floor.  
Sepsis alert called at 1124 hours by this RN.  
    FiO2 (%): none.  O2 Flow Rate: O2 Device: Nasal cannula O2 Flow Rate (L/min): 2 L/min  Cardiac Rhythm: Cardiac Rhythm: Sinus rhythm  Pain Assessment: 0-10 [x] Verbal [] Cardoso [place] Scale  Pain Scale: Pain Assessment  Pain Assessment: 0-10  Pain Level: 0  Patient's Stated Pain Goal: 0 - No pain  Last documented pain score (0-10 scale) Pain Level: 0  Last documented pain medication administered: none.  Mental Status: disoriented and able to concentrate and follow conversation  Orientation Level: Orientation Level: Oriented to person, Oriented to place, Disoriented to time, Disoriented to situation  NIH Score:    C-SSRS: Risk of Suicide: No Risk  Bedside swallow:    [place] Coma Scale (GCS): [place] Coma Scale  Eye Opening: Spontaneous  Best Verbal Response: Confused  Best Motor Response: Obeys commands  [place] Coma Scale Score: 14  Active LDA's:   Peripheral IV 11/06/24 Distal;Right;Anterior Forearm (Active)       Peripheral IV 11/06/24 Right Antecubital (Active)                 PO Status: Regular  Pertinent or High Risk Medications/Drips: no   If Yes, please provide details: none.  Pending Blood Product Administration: no       You may also review the ED PT Care Timeline found under the Summary Nursing Index tab.    Recommendation    Pending orders none.  Plan for Discharge (if known): Vivian To  Additional Comments: none.   If any further questions, please call Sending RN at [phone].    Electronically signed by: Electronically signed by Han Najera RN on 11/6/2024 at 1:51 PM

## 2024-11-07 PROBLEM — J96.11 CHRONIC RESPIRATORY FAILURE WITH HYPOXIA: Status: ACTIVE | Noted: 2024-11-07

## 2024-11-07 PROBLEM — F03.90 DEMENTIA (HCC): Status: ACTIVE | Noted: 2024-11-07

## 2024-11-07 PROBLEM — R09.02 HYPOXEMIA: Status: ACTIVE | Noted: 2024-11-07

## 2024-11-07 LAB
ANION GAP SERPL CALCULATED.3IONS-SCNC: 8 MMOL/L (ref 3–16)
BUN SERPL-MCNC: 9 MG/DL (ref 7–20)
CALCIUM SERPL-MCNC: 8.4 MG/DL (ref 8.3–10.6)
CHLORIDE SERPL-SCNC: 107 MMOL/L (ref 99–110)
CO2 SERPL-SCNC: 27 MMOL/L (ref 21–32)
CREAT SERPL-MCNC: 0.8 MG/DL (ref 0.8–1.3)
GFR SERPLBLD CREATININE-BSD FMLA CKD-EPI: 80 ML/MIN/{1.73_M2}
GLUCOSE SERPL-MCNC: 145 MG/DL (ref 70–99)
POTASSIUM SERPL-SCNC: 3.6 MMOL/L (ref 3.5–5.1)
SODIUM SERPL-SCNC: 142 MMOL/L (ref 136–145)

## 2024-11-07 PROCEDURE — 6370000000 HC RX 637 (ALT 250 FOR IP): Performed by: INTERNAL MEDICINE

## 2024-11-07 PROCEDURE — 92526 ORAL FUNCTION THERAPY: CPT

## 2024-11-07 PROCEDURE — 94640 AIRWAY INHALATION TREATMENT: CPT

## 2024-11-07 PROCEDURE — 80048 BASIC METABOLIC PNL TOTAL CA: CPT

## 2024-11-07 PROCEDURE — 6360000002 HC RX W HCPCS: Performed by: INTERNAL MEDICINE

## 2024-11-07 PROCEDURE — 2700000000 HC OXYGEN THERAPY PER DAY

## 2024-11-07 PROCEDURE — 94761 N-INVAS EAR/PLS OXIMETRY MLT: CPT

## 2024-11-07 PROCEDURE — 1200000000 HC SEMI PRIVATE

## 2024-11-07 PROCEDURE — 36415 COLL VENOUS BLD VENIPUNCTURE: CPT

## 2024-11-07 PROCEDURE — 92610 EVALUATE SWALLOWING FUNCTION: CPT

## 2024-11-07 RX ORDER — AMLODIPINE BESYLATE 5 MG/1
10 TABLET ORAL DAILY
Status: DISCONTINUED | OUTPATIENT
Start: 2024-11-07 | End: 2024-11-10 | Stop reason: HOSPADM

## 2024-11-07 RX ORDER — DICYCLOMINE HYDROCHLORIDE 10 MG/1
20 CAPSULE ORAL 3 TIMES DAILY PRN
Status: DISCONTINUED | OUTPATIENT
Start: 2024-11-07 | End: 2024-11-10 | Stop reason: HOSPADM

## 2024-11-07 RX ADMIN — AMLODIPINE BESYLATE 10 MG: 5 TABLET ORAL at 21:39

## 2024-11-07 RX ADMIN — ENOXAPARIN SODIUM 30 MG: 100 INJECTION SUBCUTANEOUS at 21:40

## 2024-11-07 RX ADMIN — LOSARTAN POTASSIUM 100 MG: 100 TABLET, FILM COATED ORAL at 10:29

## 2024-11-07 RX ADMIN — PIPERACILLIN SODIUM AND TAZOBACTAM SODIUM 3375 MG: 3; .375 INJECTION, SOLUTION INTRAVENOUS at 17:33

## 2024-11-07 RX ADMIN — PIPERACILLIN SODIUM AND TAZOBACTAM SODIUM 3375 MG: 3; .375 INJECTION, SOLUTION INTRAVENOUS at 00:40

## 2024-11-07 RX ADMIN — PIPERACILLIN SODIUM AND TAZOBACTAM SODIUM 3375 MG: 3; .375 INJECTION, SOLUTION INTRAVENOUS at 10:41

## 2024-11-07 RX ADMIN — ENOXAPARIN SODIUM 30 MG: 100 INJECTION SUBCUTANEOUS at 10:30

## 2024-11-07 RX ADMIN — Medication 2 PUFF: at 20:25

## 2024-11-07 RX ADMIN — AMLODIPINE BESYLATE 5 MG: 5 TABLET ORAL at 10:29

## 2024-11-07 RX ADMIN — Medication 2000 UNITS: at 10:29

## 2024-11-07 RX ADMIN — THERA TABS 1 TABLET: TAB at 10:29

## 2024-11-07 RX ADMIN — GABAPENTIN 600 MG: 300 CAPSULE ORAL at 10:29

## 2024-11-07 RX ADMIN — ACETAMINOPHEN 500 MG: 500 TABLET ORAL at 17:36

## 2024-11-07 RX ADMIN — Medication 2 PUFF: at 08:55

## 2024-11-07 RX ADMIN — FINASTERIDE 5 MG: 5 TABLET, FILM COATED ORAL at 10:29

## 2024-11-07 RX ADMIN — PANTOPRAZOLE SODIUM 40 MG: 40 TABLET, DELAYED RELEASE ORAL at 21:39

## 2024-11-07 ASSESSMENT — PAIN DESCRIPTION - ORIENTATION: ORIENTATION: UPPER

## 2024-11-07 ASSESSMENT — PAIN SCALES - GENERAL: PAINLEVEL_OUTOF10: 7

## 2024-11-07 ASSESSMENT — PAIN DESCRIPTION - LOCATION: LOCATION: HEAD

## 2024-11-07 ASSESSMENT — PAIN DESCRIPTION - DESCRIPTORS: DESCRIPTORS: ACHING

## 2024-11-07 NOTE — CARE COORDINATION
Discharge Planning:     (CM) reviewed the patient's chart to assess needs. Patient's Readmission Risk Score is 10%. Patient's medical insurance is [facility] Medicare. Patient's PCP is GABRIELLE CAMPBELL III .     Patient is from [facility]. If the patient returns skilled he will need therapy and a pre-cert. CM spoke to [facility] to confirm placement.    No needs anticipated, at this time. CM team to follow. Staff to inform CM if additional discharge needs arise.     Electronically signed by Gogo Duran on 11/7/24 at 8:55 AM EST

## 2024-11-07 NOTE — H&P
[place] [address]                           HISTORY & PHYSICAL      PATIENT NAME: SYLVIA CONROY               : 1925  MED REC NO: [id number]                      ROOM: [address] [place] [address]  ACCOUNT NO: [id number]                       ADMIT DATE: 2024  PROVIDER: Yaya Rush MD      HISTORY OF PRESENT ILLNESS:  The patient is a 89-year-old white American gentleman, moderately obese, came to the emergency room with increasing shortness of breath and dropping O2 saturation with an O2 saturation going up to the 80%.  The patient is actually using intermittent home oxygen.  He also had altered mental status.  Moderate shortness of breath.  Overall, a poor historian.  Some memory loss and poor cognition.  The patient denies any chest pain.  No fevers.  Does have bruising up the left side.    PAST MEDICAL HISTORY:  Pertinent for diabetes, respiratory failure, hypoxemia, COPD, congestive heart failure, peripheral neuropathy, gastroesophageal reflux disease, hypertension, pneumonia, osteoarthritis, depression, hyperlipidemia.    MEDICATIONS:  The patient is on Tylenol, amlodipine, Symbicort, Proscar, Neurontin, Advil, losartan, multivitamin, Protonix, vitamin D.    ALLERGIES:  THE PATIENT IS ALLERGIC TO BACLOFEN AND MORPHINE.      SOCIAL HISTORY:  The patient is a  man.  He has 3 daughters.  They frequently check on him.  The patient has a full-time caregiver at [facility].  There is no history of smoking.  He would have an occasional alcoholic beverage, not to exceed 2 drinks a year.  No history of substance abuse.  He used to work at [employer].    FAMILY HISTORY:  Both his parents are  because of natural causes.  No further history available.  His father had some kind of cancer and emphysema.  His mother had positive hyperlipidemia status.    REVIEW OF SYSTEMS:  Negative for loss of consciousness.  Does have

## 2024-11-07 NOTE — PLAN OF CARE
Problem: ABCDS Injury Assessment  Goal: Absence of physical injury  Outcome: Progressing     Problem: Chronic Conditions and Co-morbidities  Goal: Patient's chronic conditions and co-morbidity symptoms are monitored and maintained or improved  Outcome: Progressing     Problem: Pain  Goal: Verbalizes/displays adequate comfort level or baseline comfort level  Outcome: Progressing     Problem: Confusion  Goal: Confusion, delirium, dementia, or psychosis is improved or at baseline  Description: INTERVENTIONS:  1. Assess for possible contributors to thought disturbance, including medications, impaired vision or hearing, underlying metabolic abnormalities, dehydration, psychiatric diagnoses, and notify attending LIP  2. [place] high risk fall precautions, as indicated  3. Provide frequent short contacts to provide reality reorientation, refocusing and direction  4. Decrease environmental stimuli, including noise as appropriate  5. Monitor and intervene to maintain adequate nutrition, hydration, elimination, sleep and activity  6. If unable to ensure safety without constant attention obtain sitter and review sitter guidelines with assigned personnel  7. Initiate Psychosocial CNS and Spiritual Care consult, as indicated  Outcome: Progressing     Problem: Safety - Adult  Goal: Free from fall injury  Outcome: Progressing

## 2024-11-07 NOTE — PLAN OF CARE
Problem: ABCDS Injury Assessment  Goal: Absence of physical injury  Outcome: Progressing     Problem: Chronic Conditions and Co-morbidities  Goal: Patient's chronic conditions and co-morbidity symptoms are monitored and maintained or improved  Outcome: Progressing     Problem: Pain  Goal: Verbalizes/displays adequate comfort level or baseline comfort level  Outcome: Progressing     Problem: Confusion  Goal: Confusion, delirium, dementia, or psychosis is improved or at baseline  Description: INTERVENTIONS:  1. Assess for possible contributors to thought disturbance, including medications, impaired vision or hearing, underlying metabolic abnormalities, dehydration, psychiatric diagnoses, and notify attending LIP  2. [place] high risk fall precautions, as indicated  3. Provide frequent short contacts to provide reality reorientation, refocusing and direction  4. Decrease environmental stimuli, including noise as appropriate  5. Monitor and intervene to maintain adequate nutrition, hydration, elimination, sleep and activity  6. If unable to ensure safety without constant attention obtain sitter and review sitter guidelines with assigned personnel  7. Initiate Psychosocial CNS and Spiritual Care consult, as indicated  Outcome: Progressing     Problem: Safety - Adult  Goal: Free from fall injury  Outcome: Progressing

## 2024-11-08 ENCOUNTER — APPOINTMENT (OUTPATIENT)
Dept: ULTRASOUND IMAGING | Age: 89
DRG: 193 | End: 2024-11-08
Payer: MEDICARE

## 2024-11-08 PROBLEM — K80.20 CHOLELITHIASIS: Status: ACTIVE | Noted: 2024-11-08

## 2024-11-08 LAB
ANION GAP SERPL CALCULATED.3IONS-SCNC: 10 MMOL/L (ref 3–16)
BUN SERPL-MCNC: 9 MG/DL (ref 7–20)
CALCIUM SERPL-MCNC: 8.9 MG/DL (ref 8.3–10.6)
CHLORIDE SERPL-SCNC: 104 MMOL/L (ref 99–110)
CO2 SERPL-SCNC: 26 MMOL/L (ref 21–32)
CREAT SERPL-MCNC: 0.8 MG/DL (ref 0.8–1.3)
DEPRECATED RDW RBC AUTO: 13.5 % (ref 12.4–15.4)
GFR SERPLBLD CREATININE-BSD FMLA CKD-EPI: 80 ML/MIN/{1.73_M2}
GLUCOSE SERPL-MCNC: 157 MG/DL (ref 70–99)
HCT VFR BLD AUTO: 42.7 % (ref 40.5–52.5)
HGB BLD-MCNC: 14.7 G/DL (ref 13.5–17.5)
MCH RBC QN AUTO: 32.9 PG (ref 26–34)
MCHC RBC AUTO-ENTMCNC: 34.5 G/DL (ref 31–36)
MCV RBC AUTO: 95.5 FL (ref 80–100)
PLATELET # BLD AUTO: 170 K/UL (ref 135–450)
PMV BLD AUTO: 8.3 FL (ref 5–10.5)
POTASSIUM SERPL-SCNC: 3.5 MMOL/L (ref 3.5–5.1)
RBC # BLD AUTO: 4.47 M/UL (ref 4.2–5.9)
REASON FOR REJECTION: NORMAL
REJECTED TEST: NORMAL
SODIUM SERPL-SCNC: 140 MMOL/L (ref 136–145)
WBC # BLD AUTO: 8.3 K/UL (ref 4–11)

## 2024-11-08 PROCEDURE — 94761 N-INVAS EAR/PLS OXIMETRY MLT: CPT

## 2024-11-08 PROCEDURE — 1200000000 HC SEMI PRIVATE

## 2024-11-08 PROCEDURE — 97535 SELF CARE MNGMENT TRAINING: CPT

## 2024-11-08 PROCEDURE — 6360000002 HC RX W HCPCS: Performed by: INTERNAL MEDICINE

## 2024-11-08 PROCEDURE — 97116 GAIT TRAINING THERAPY: CPT

## 2024-11-08 PROCEDURE — 97161 PT EVAL LOW COMPLEX 20 MIN: CPT

## 2024-11-08 PROCEDURE — 97165 OT EVAL LOW COMPLEX 30 MIN: CPT

## 2024-11-08 PROCEDURE — 85027 COMPLETE CBC AUTOMATED: CPT

## 2024-11-08 PROCEDURE — 2700000000 HC OXYGEN THERAPY PER DAY

## 2024-11-08 PROCEDURE — 80048 BASIC METABOLIC PNL TOTAL CA: CPT

## 2024-11-08 PROCEDURE — 36415 COLL VENOUS BLD VENIPUNCTURE: CPT

## 2024-11-08 PROCEDURE — 97530 THERAPEUTIC ACTIVITIES: CPT

## 2024-11-08 PROCEDURE — 76700 US EXAM ABDOM COMPLETE: CPT

## 2024-11-08 PROCEDURE — 94640 AIRWAY INHALATION TREATMENT: CPT

## 2024-11-08 PROCEDURE — 6370000000 HC RX 637 (ALT 250 FOR IP): Performed by: INTERNAL MEDICINE

## 2024-11-08 RX ADMIN — Medication 2 PUFF: at 11:15

## 2024-11-08 RX ADMIN — THERA TABS 1 TABLET: TAB at 08:30

## 2024-11-08 RX ADMIN — LOSARTAN POTASSIUM 100 MG: 100 TABLET, FILM COATED ORAL at 08:30

## 2024-11-08 RX ADMIN — PANTOPRAZOLE SODIUM 40 MG: 40 TABLET, DELAYED RELEASE ORAL at 20:47

## 2024-11-08 RX ADMIN — ENOXAPARIN SODIUM 30 MG: 100 INJECTION SUBCUTANEOUS at 20:47

## 2024-11-08 RX ADMIN — PIPERACILLIN SODIUM AND TAZOBACTAM SODIUM 3375 MG: 3; .375 INJECTION, SOLUTION INTRAVENOUS at 02:26

## 2024-11-08 RX ADMIN — Medication 2 PUFF: at 20:54

## 2024-11-08 RX ADMIN — GABAPENTIN 600 MG: 300 CAPSULE ORAL at 08:30

## 2024-11-08 RX ADMIN — FINASTERIDE 5 MG: 5 TABLET, FILM COATED ORAL at 08:30

## 2024-11-08 RX ADMIN — PANTOPRAZOLE SODIUM 40 MG: 40 TABLET, DELAYED RELEASE ORAL at 08:30

## 2024-11-08 RX ADMIN — AMLODIPINE BESYLATE 10 MG: 5 TABLET ORAL at 08:29

## 2024-11-08 RX ADMIN — PIPERACILLIN SODIUM AND TAZOBACTAM SODIUM 3375 MG: 3; .375 INJECTION, SOLUTION INTRAVENOUS at 16:51

## 2024-11-08 RX ADMIN — ENOXAPARIN SODIUM 30 MG: 100 INJECTION SUBCUTANEOUS at 08:29

## 2024-11-08 RX ADMIN — Medication 2000 UNITS: at 08:30

## 2024-11-08 NOTE — PLAN OF CARE
Problem: ABCDS Injury Assessment  Goal: Absence of physical injury  Outcome: Progressing     Problem: Chronic Conditions and Co-morbidities  Goal: Patient's chronic conditions and co-morbidity symptoms are monitored and maintained or improved  Outcome: Progressing     Problem: Pain  Goal: Verbalizes/displays adequate comfort level or baseline comfort level  Outcome: Progressing     Problem: Confusion  Goal: Confusion, delirium, dementia, or psychosis is improved or at baseline  Description: INTERVENTIONS:  1. Assess for possible contributors to thought disturbance, including medications, impaired vision or hearing, underlying metabolic abnormalities, dehydration, psychiatric diagnoses, and notify attending LIP  2. [place] high risk fall precautions, as indicated  3. Provide frequent short contacts to provide reality reorientation, refocusing and direction  4. Decrease environmental stimuli, including noise as appropriate  5. Monitor and intervene to maintain adequate nutrition, hydration, elimination, sleep and activity  6. If unable to ensure safety without constant attention obtain sitter and review sitter guidelines with assigned personnel  7. Initiate Psychosocial CNS and Spiritual Care consult, as indicated  Outcome: Progressing     Problem: Safety - Adult  Goal: Free from fall injury  Outcome: Progressing

## 2024-11-08 NOTE — PLAN OF CARE
Problem: ABCDS Injury Assessment  Goal: Absence of physical injury  11/8/2024 0026 by Jose Perez RN  Outcome: Progressing     Problem: Chronic Conditions and Co-morbidities  Goal: Patient's chronic conditions and co-morbidity symptoms are monitored and maintained or improved  11/8/2024 0026 by Jose Perez RN  Outcome: Progressing     Problem: Pain  Goal: Verbalizes/displays adequate comfort level or baseline comfort level  11/8/2024 0026 by Jose Perez RN  Outcome: Progressing     Problem: Confusion  Goal: Confusion, delirium, dementia, or psychosis is improved or at baseline  Description: INTERVENTIONS:  1. Assess for possible contributors to thought disturbance, including medications, impaired vision or hearing, underlying metabolic abnormalities, dehydration, psychiatric diagnoses, and notify attending LIP  2. [place] high risk fall precautions, as indicated  3. Provide frequent short contacts to provide reality reorientation, refocusing and direction  4. Decrease environmental stimuli, including noise as appropriate  5. Monitor and intervene to maintain adequate nutrition, hydration, elimination, sleep and activity  6. If unable to ensure safety without constant attention obtain sitter and review sitter guidelines with assigned personnel  7. Initiate Psychosocial CNS and Spiritual Care consult, as indicated  11/8/2024 0026 by Jose Perez RN  Outcome: Progressing     Problem: Safety - Adult  Goal: Free from fall injury  11/8/2024 0026 by Jose Perez RN  Outcome: Progressing      2nd message left with the pulmonary lab to fax records.  Cheyenne Jean,

## 2024-11-09 PROCEDURE — 6360000002 HC RX W HCPCS: Performed by: NURSE PRACTITIONER

## 2024-11-09 PROCEDURE — 2700000000 HC OXYGEN THERAPY PER DAY

## 2024-11-09 PROCEDURE — 6360000002 HC RX W HCPCS: Performed by: INTERNAL MEDICINE

## 2024-11-09 PROCEDURE — 94761 N-INVAS EAR/PLS OXIMETRY MLT: CPT

## 2024-11-09 PROCEDURE — 6370000000 HC RX 637 (ALT 250 FOR IP): Performed by: INTERNAL MEDICINE

## 2024-11-09 PROCEDURE — 99222 1ST HOSP IP/OBS MODERATE 55: CPT | Performed by: SURGERY

## 2024-11-09 PROCEDURE — 1200000000 HC SEMI PRIVATE

## 2024-11-09 PROCEDURE — 94640 AIRWAY INHALATION TREATMENT: CPT

## 2024-11-09 RX ORDER — HYDRALAZINE HYDROCHLORIDE 20 MG/ML
10 INJECTION INTRAMUSCULAR; INTRAVENOUS EVERY 6 HOURS PRN
Status: DISCONTINUED | OUTPATIENT
Start: 2024-11-09 | End: 2024-11-10 | Stop reason: HOSPADM

## 2024-11-09 RX ADMIN — Medication 2 PUFF: at 21:47

## 2024-11-09 RX ADMIN — Medication 2000 UNITS: at 08:16

## 2024-11-09 RX ADMIN — LOSARTAN POTASSIUM 100 MG: 100 TABLET, FILM COATED ORAL at 08:17

## 2024-11-09 RX ADMIN — Medication 2 PUFF: at 12:24

## 2024-11-09 RX ADMIN — PANTOPRAZOLE SODIUM 40 MG: 40 TABLET, DELAYED RELEASE ORAL at 20:45

## 2024-11-09 RX ADMIN — METHOCARBAMOL 500 MG: 500 TABLET ORAL at 21:43

## 2024-11-09 RX ADMIN — PANTOPRAZOLE SODIUM 40 MG: 40 TABLET, DELAYED RELEASE ORAL at 08:17

## 2024-11-09 RX ADMIN — PIPERACILLIN SODIUM AND TAZOBACTAM SODIUM 3375 MG: 3; .375 INJECTION, SOLUTION INTRAVENOUS at 08:20

## 2024-11-09 RX ADMIN — PIPERACILLIN SODIUM AND TAZOBACTAM SODIUM 3375 MG: 3; .375 INJECTION, SOLUTION INTRAVENOUS at 01:06

## 2024-11-09 RX ADMIN — PIPERACILLIN SODIUM AND TAZOBACTAM SODIUM 3375 MG: 3; .375 INJECTION, SOLUTION INTRAVENOUS at 16:24

## 2024-11-09 RX ADMIN — THERA TABS 1 TABLET: TAB at 08:17

## 2024-11-09 RX ADMIN — ACETAMINOPHEN 500 MG: 500 TABLET ORAL at 21:43

## 2024-11-09 RX ADMIN — HYDRALAZINE HYDROCHLORIDE 10 MG: 20 INJECTION INTRAMUSCULAR; INTRAVENOUS at 20:44

## 2024-11-09 RX ADMIN — FINASTERIDE 5 MG: 5 TABLET, FILM COATED ORAL at 08:16

## 2024-11-09 RX ADMIN — ENOXAPARIN SODIUM 30 MG: 100 INJECTION SUBCUTANEOUS at 20:44

## 2024-11-09 RX ADMIN — ENOXAPARIN SODIUM 30 MG: 100 INJECTION SUBCUTANEOUS at 08:17

## 2024-11-09 RX ADMIN — AMLODIPINE BESYLATE 10 MG: 5 TABLET ORAL at 08:16

## 2024-11-09 RX ADMIN — GABAPENTIN 600 MG: 300 CAPSULE ORAL at 08:17

## 2024-11-09 RX ADMIN — METHOCARBAMOL 500 MG: 500 TABLET ORAL at 03:23

## 2024-11-09 ASSESSMENT — PAIN SCALES - GENERAL
PAINLEVEL_OUTOF10: 3
PAINLEVEL_OUTOF10: 0

## 2024-11-09 NOTE — CONSULTS
[facility] GENERAL AND LAPAROSCOPIC SURGERY                       PATIENT NAME: Jr Brown     ADMISSION DATE: 11/6/2024 11:15 AM      TODAY'S DATE: 11/9/2024    Reason for Consult:  Gallstones    Requesting Physician:  Dr. TIMOTHY Rush    HISTORY OF PRESENT ILLNESS:              The patient is a 89 y.o. male who presents with pneumonia. Pt had pain in the area of the mid abdomen. Pt has had mild, focal pain, Some with pressure. Felt bad yesterday, feels better today. Pt up to chair, no N/V.    Past Medical History:        Diagnosis Date    Anxiety     Aspiration pneumonia (HCC)     BPH     BPH (benign prostatic hyperplasia)     Chronic pain     Chronic respiratory failure with hypoxia     Cognitive communication deficit     Constipation     Dementia (HCC)     Disorder of prostate, unspecified     Dysphagia     Elevated prostate specific antigen (PSA)     Esophageal reflux     Fracture closed, humerus 12/09/2009    Shoulder    GERD (gastroesophageal reflux disease)     HTN (hypertension)     Hypercapnia     Hypercapnia     Hyperlipidemia     Idiopathic peripheral autonomic neuropathy     Insomnia     Lateral epicondylitis     Major depression     Major depressive disorder, recurrent (HCC)     Olecranon bursitis, unspecified laterality     Osteoarthritis     Other abnormalities of gait and mobility     Other lack of coordination     Peripheral neuropathy     Peripheral neuropathy     PNA (pneumonia)     Polyneuropathy     Polyneuropathy     Retention of urine     Type II or unspecified type diabetes mellitus without mention of complication, not stated as uncontrolled     Unsteadiness on feet     Unsteadiness on feet     Vitamin deficiency     Weakness        Past Surgical History:        Procedure Laterality Date    ANKLE SURGERY  2002    CATARACT REMOVAL  10-03    Right    COLONOSCOPY  10-02    Normal    KNEE SURGERY  1998    Arthroscopic Right    REVISION TOTAL KNEE ARTHROPLASTY  1-14-09    Infected knee

## 2024-11-09 NOTE — PLAN OF CARE
Problem: ABCDS Injury Assessment  Goal: Absence of physical injury  11/8/2024 2256 by Enrique Barrientos RN  Outcome: Progressing  Flowsheets (Taken 11/8/2024 2054)  Absence of Physical Injury: Implement safety measures based on patient assessment  11/8/2024 1713 by Suzy Lozano RN  Outcome: Progressing     Problem: Chronic Conditions and Co-morbidities  Goal: Patient's chronic conditions and co-morbidity symptoms are monitored and maintained or improved  11/8/2024 2256 by Enrique Barrientos RN  Outcome: Progressing  Flowsheets (Taken 11/8/2024 2054)  Care Plan - Patient's Chronic Conditions and Co-Morbidity Symptoms are Monitored and Maintained or Improved:   Monitor and assess patient's chronic conditions and comorbid symptoms for stability, deterioration, or improvement   Collaborate with multidisciplinary team to address chronic and comorbid conditions and prevent exacerbation or deterioration   Update acute care plan with appropriate goals if chronic or comorbid symptoms are exacerbated and prevent overall improvement and discharge  11/8/2024 1713 by Suzy Lozano RN  Outcome: Progressing     Problem: Pain  Goal: Verbalizes/displays adequate comfort level or baseline comfort level  11/8/2024 2256 by Enrique Barrientos RN  Outcome: Progressing  Flowsheets (Taken 11/8/2024 2045)  Verbalizes/displays adequate comfort level or baseline comfort level:   Encourage patient to monitor pain and request assistance   Assess pain using appropriate pain scale   Administer analgesics based on type and severity of pain and evaluate response   Implement non-pharmacological measures as appropriate and evaluate response   Consider cultural and social influences on pain and pain management   Notify Licensed Independent Practitioner if interventions unsuccessful or patient reports new pain  11/8/2024 1713 by Suzy Lozano RN  Outcome: Progressing     Problem: Confusion  Goal: Confusion, delirium, dementia, or psychosis is

## 2024-11-09 NOTE — PLAN OF CARE
Problem: ABCDS Injury Assessment  Goal: Absence of physical injury  Outcome: Progressing     Problem: Chronic Conditions and Co-morbidities  Goal: Patient's chronic conditions and co-morbidity symptoms are monitored and maintained or improved  Outcome: Progressing     Problem: Pain  Goal: Verbalizes/displays adequate comfort level or baseline comfort level  Outcome: Progressing  Flowsheets  Taken 11/9/2024 1615  Verbalizes/displays adequate comfort level or baseline comfort level: Encourage patient to monitor pain and request assistance  Taken 11/9/2024 0800  Verbalizes/displays adequate comfort level or baseline comfort level: Encourage patient to monitor pain and request assistance     Problem: Confusion  Goal: Confusion, delirium, dementia, or psychosis is improved or at baseline  Description: INTERVENTIONS:  1. Assess for possible contributors to thought disturbance, including medications, impaired vision or hearing, underlying metabolic abnormalities, dehydration, psychiatric diagnoses, and notify attending LIP  2. [place] high risk fall precautions, as indicated  3. Provide frequent short contacts to provide reality reorientation, refocusing and direction  4. Decrease environmental stimuli, including noise as appropriate  5. Monitor and intervene to maintain adequate nutrition, hydration, elimination, sleep and activity  6. If unable to ensure safety without constant attention obtain sitter and review sitter guidelines with assigned personnel  7. Initiate Psychosocial CNS and Spiritual Care consult, as indicated  Outcome: Progressing     Problem: Safety - Adult  Goal: Free from fall injury  Outcome: Progressing

## 2024-11-10 VITALS
WEIGHT: 254.7 LBS | HEIGHT: 70 IN | BODY MASS INDEX: 36.46 KG/M2 | TEMPERATURE: 97.9 F | RESPIRATION RATE: 14 BRPM | DIASTOLIC BLOOD PRESSURE: 85 MMHG | HEART RATE: 61 BPM | SYSTOLIC BLOOD PRESSURE: 159 MMHG | OXYGEN SATURATION: 96 %

## 2024-11-10 LAB
BACTERIA BLD CULT ORG #2: NORMAL
BACTERIA BLD CULT: NORMAL

## 2024-11-10 PROCEDURE — 6370000000 HC RX 637 (ALT 250 FOR IP): Performed by: INTERNAL MEDICINE

## 2024-11-10 PROCEDURE — 94640 AIRWAY INHALATION TREATMENT: CPT

## 2024-11-10 PROCEDURE — 2700000000 HC OXYGEN THERAPY PER DAY

## 2024-11-10 PROCEDURE — 6360000002 HC RX W HCPCS: Performed by: INTERNAL MEDICINE

## 2024-11-10 PROCEDURE — 6370000000 HC RX 637 (ALT 250 FOR IP): Performed by: SURGERY

## 2024-11-10 PROCEDURE — 94761 N-INVAS EAR/PLS OXIMETRY MLT: CPT

## 2024-11-10 PROCEDURE — 6360000002 HC RX W HCPCS: Performed by: NURSE PRACTITIONER

## 2024-11-10 PROCEDURE — 99232 SBSQ HOSP IP/OBS MODERATE 35: CPT | Performed by: SURGERY

## 2024-11-10 RX ORDER — AMLODIPINE BESYLATE 10 MG/1
10 TABLET ORAL DAILY
Qty: 30 TABLET | Refills: 3 | Status: SHIPPED | OUTPATIENT
Start: 2024-11-11

## 2024-11-10 RX ORDER — IPRATROPIUM BROMIDE AND ALBUTEROL SULFATE 2.5; .5 MG/3ML; MG/3ML
3 SOLUTION RESPIRATORY (INHALATION) EVERY 4 HOURS PRN
Qty: 360 ML | Refills: 0 | Status: SHIPPED | OUTPATIENT
Start: 2024-11-10

## 2024-11-10 RX ORDER — BISACODYL 5 MG/1
10 TABLET, DELAYED RELEASE ORAL ONCE
Status: COMPLETED | OUTPATIENT
Start: 2024-11-10 | End: 2024-11-10

## 2024-11-10 RX ORDER — AMOXICILLIN AND CLAVULANATE POTASSIUM 500; 125 MG/1; MG/1
1 TABLET, FILM COATED ORAL EVERY 8 HOURS SCHEDULED
Qty: 21 TABLET | Refills: 0 | Status: SHIPPED | OUTPATIENT
Start: 2024-11-10 | End: 2024-11-17

## 2024-11-10 RX ORDER — DICYCLOMINE HYDROCHLORIDE 10 MG/1
20 CAPSULE ORAL 3 TIMES DAILY PRN
Qty: 60 CAPSULE | Refills: 1 | Status: SHIPPED | OUTPATIENT
Start: 2024-11-10

## 2024-11-10 RX ORDER — PANTOPRAZOLE SODIUM 40 MG/1
40 TABLET, DELAYED RELEASE ORAL 2 TIMES DAILY
Qty: 30 TABLET | Refills: 3 | Status: SHIPPED | OUTPATIENT
Start: 2024-11-10

## 2024-11-10 RX ORDER — LOSARTAN POTASSIUM 100 MG/1
100 TABLET ORAL DAILY
Qty: 30 TABLET | Refills: 3 | Status: SHIPPED | OUTPATIENT
Start: 2024-11-11

## 2024-11-10 RX ORDER — LACTULOSE 10 G/15ML
20 SOLUTION ORAL 2 TIMES DAILY
Status: DISCONTINUED | OUTPATIENT
Start: 2024-11-10 | End: 2024-11-10 | Stop reason: HOSPADM

## 2024-11-10 RX ORDER — AMOXICILLIN AND CLAVULANATE POTASSIUM 500; 125 MG/1; MG/1
1 TABLET, FILM COATED ORAL EVERY 8 HOURS SCHEDULED
Status: DISCONTINUED | OUTPATIENT
Start: 2024-11-10 | End: 2024-11-10 | Stop reason: HOSPADM

## 2024-11-10 RX ADMIN — METHOCARBAMOL 500 MG: 500 TABLET ORAL at 05:16

## 2024-11-10 RX ADMIN — Medication 2000 UNITS: at 08:45

## 2024-11-10 RX ADMIN — PIPERACILLIN SODIUM AND TAZOBACTAM SODIUM 3375 MG: 3; .375 INJECTION, SOLUTION INTRAVENOUS at 08:48

## 2024-11-10 RX ADMIN — PIPERACILLIN SODIUM AND TAZOBACTAM SODIUM 3375 MG: 3; .375 INJECTION, SOLUTION INTRAVENOUS at 00:21

## 2024-11-10 RX ADMIN — AMOXICILLIN AND CLAVULANATE POTASSIUM 1 TABLET: 500; 125 TABLET, FILM COATED ORAL at 16:12

## 2024-11-10 RX ADMIN — BISACODYL 10 MG: 5 TABLET, COATED ORAL at 16:12

## 2024-11-10 RX ADMIN — AMLODIPINE BESYLATE 10 MG: 5 TABLET ORAL at 08:45

## 2024-11-10 RX ADMIN — GABAPENTIN 600 MG: 300 CAPSULE ORAL at 08:45

## 2024-11-10 RX ADMIN — LOSARTAN POTASSIUM 100 MG: 100 TABLET, FILM COATED ORAL at 08:45

## 2024-11-10 RX ADMIN — Medication 2 PUFF: at 08:31

## 2024-11-10 RX ADMIN — ACETAMINOPHEN 500 MG: 500 TABLET ORAL at 05:16

## 2024-11-10 RX ADMIN — ENOXAPARIN SODIUM 30 MG: 100 INJECTION SUBCUTANEOUS at 08:45

## 2024-11-10 RX ADMIN — THERA TABS 1 TABLET: TAB at 08:45

## 2024-11-10 RX ADMIN — HYDRALAZINE HYDROCHLORIDE 10 MG: 20 INJECTION INTRAMUSCULAR; INTRAVENOUS at 05:16

## 2024-11-10 RX ADMIN — FINASTERIDE 5 MG: 5 TABLET, FILM COATED ORAL at 08:45

## 2024-11-10 RX ADMIN — PANTOPRAZOLE SODIUM 40 MG: 40 TABLET, DELAYED RELEASE ORAL at 08:45

## 2024-11-10 ASSESSMENT — PAIN SCALES - GENERAL
PAINLEVEL_OUTOF10: 8
PAINLEVEL_OUTOF10: 5
PAINLEVEL_OUTOF10: 8

## 2024-11-10 ASSESSMENT — PAIN DESCRIPTION - DESCRIPTORS
DESCRIPTORS: ACHING;DISCOMFORT
DESCRIPTORS: ACHING;POUNDING;PRESSURE

## 2024-11-10 ASSESSMENT — PAIN DESCRIPTION - LOCATION
LOCATION: HEAD
LOCATION: HEAD;FOOT

## 2024-11-10 ASSESSMENT — PAIN DESCRIPTION - ORIENTATION
ORIENTATION: ANTERIOR;DISTAL
ORIENTATION: RIGHT;LEFT

## 2024-11-10 NOTE — PLAN OF CARE
Problem: ABCDS Injury Assessment  Goal: Absence of physical injury  Outcome: Progressing     Problem: Chronic Conditions and Co-morbidities  Goal: Patient's chronic conditions and co-morbidity symptoms are monitored and maintained or improved  Outcome: Progressing     Problem: Pain  Goal: Verbalizes/displays adequate comfort level or baseline comfort level  Outcome: Progressing  Flowsheets (Taken 11/10/2024 2740)  Verbalizes/displays adequate comfort level or baseline comfort level: Encourage patient to monitor pain and request assistance     Problem: Confusion  Goal: Confusion, delirium, dementia, or psychosis is improved or at baseline  Description: INTERVENTIONS:  1. Assess for possible contributors to thought disturbance, including medications, impaired vision or hearing, underlying metabolic abnormalities, dehydration, psychiatric diagnoses, and notify attending LIP  2. [place] high risk fall precautions, as indicated  3. Provide frequent short contacts to provide reality reorientation, refocusing and direction  4. Decrease environmental stimuli, including noise as appropriate  5. Monitor and intervene to maintain adequate nutrition, hydration, elimination, sleep and activity  6. If unable to ensure safety without constant attention obtain sitter and review sitter guidelines with assigned personnel  7. Initiate Psychosocial CNS and Spiritual Care consult, as indicated  Outcome: Progressing     Problem: Safety - Adult  Goal: Free from fall injury  Outcome: Progressing

## 2024-11-10 NOTE — DISCHARGE INSTR - COC
Continuity of Care Form    Patient Name: Jr Brown   :  1925  MRN:  [id number]    Admit date:  2024  Discharge date:  11/10/2024    Code Status Order: DNR-CC   Advance Directives:   Advance Care Flowsheet Documentation             Admitting Physician:  Yaya Rush MD  PCP: Alvaro Aguilar III, MD    Discharging Nurse: Chantal DAHL  Discharging Hospital Unit/Room#: 5TN-5564/5564-01  Discharging Unit Phone Number: [phone]    Emergency Contact:   Extended Emergency Contact Information  Primary Emergency Contact: Kelley Hernandez  Address: [address] [place] [address]  Home Phone: [phone]  Mobile Phone: [phone]  Relation: Child  Secondary Emergency Contact: Jarrett Brown  Home Phone: [phone]  Mobile Phone: [phone]  Relation: Child    Past Surgical History:  Past Surgical History:   Procedure Laterality Date    ANKLE SURGERY      CATARACT REMOVAL  10-03    Right    COLONOSCOPY  10-02    Normal    KNEE SURGERY      Arthroscopic Right    REVISION TOTAL KNEE ARTHROPLASTY  09    Infected knee prosthesis removed, space placed(Right knee)    REVISION TOTAL KNEE ARTHROPLASTY  09    Right knee spacer removed and new joint inserted    SHOULDER SURGERY  02/09/10    Total Shoulder arthroplasty    TONSILLECTOMY      TOTAL KNEE ARTHROPLASTY  07    Right    TOTAL KNEE ARTHROPLASTY      Left       Immunization History:   Immunization History   Administered Date(s) Administered    COVID-19, PFIZER PURPLE top, DILUTE for use, (age 12 y+), 30mcg/0.3mL 2021    Influenza Whole 09/15/2009    Pneumococcal Conjugate 7-valent (Prevnar7) 10/22/1998    TD 5LF, TENIVAC, (age 7y+), IM, 0.5mL 2024    Td, unspecified formulation 2009    Zoster Live (Zostavax) 2010       Active Problems:  Patient Active Problem List   Diagnosis Code    Peripheral neuropathy G62.9    Type II or unspecified type diabetes 
Detail Level: Simple
Detail Level: Generalized
Detail Level: Zone
Detail Level: Detailed

## 2024-11-10 NOTE — PROGRESS NOTES
[facility]        PATIENT NAME: Jr Brown     TODAY'S DATE: 11/10/2024    CC: abd pain  SUBJECTIVE:    Pt up to chair, eating breakfast  States has had some pain in mid abdomen  On O2, feels a little congested     Current Medications:   Current Facility-Administered Medications: amoxicillin-clavulanate (AUGMENTIN) 500-125 MG per tablet 1 tablet, 1 tablet, Oral, 3 times per day  hydrALAZINE (APRESOLINE) injection 10 mg, 10 mg, IntraVENous, Q6H PRN  amLODIPine (NORVASC) tablet 10 mg, 10 mg, Oral, Daily  dicyclomine (BENTYL) capsule 20 mg, 20 mg, Oral, TID PRN  ipratropium 0.5 mg-albuterol 2.5 mg (DUONEB) nebulizer solution 1 Dose, 1 Dose, Inhalation, Q4H PRN  enoxaparin Sodium (LOVENOX) injection 30 mg, 30 mg, SubCUTAneous, BID  acetaminophen (TYLENOL) tablet 500 mg, 500 mg, Oral, Q4H PRN  multivitamin 1 tablet, 1 tablet, Oral, Daily  Vitamin D (CHOLECALCIFEROL) tablet 2,000 Units, 2,000 Units, Oral, Daily  finasteride (PROSCAR) tablet 5 mg, 5 mg, Oral, Daily  budesonide-formoterol (SYMBICORT) 80-4.5 MCG/ACT inhaler 2 puff, 2 puff, Inhalation, BID RT  gabapentin (NEURONTIN) capsule 600 mg, 600 mg, Oral, Daily  losartan (COZAAR) tablet 100 mg, 100 mg, Oral, Daily  methocarbamol (ROBAXIN) tablet 500 mg, 500 mg, Oral, Q6H PRN  pantoprazole (PROTONIX) tablet 40 mg, 40 mg, Oral, BID  Prior to Admission medications    Medication Sig Start Date End Date Taking? Authorizing Provider   ipratropium 0.5 mg-albuterol 2.5 mg (DUONEB) 0.5-2.5 (3) MG/3ML SOLN nebulizer solution Inhale 3 mLs into the lungs every 4 hours as needed for Shortness of Breath 11/10/24  Yes Yaya Rush MD   losartan (COZAAR) 100 MG tablet Take 1 tablet by mouth daily 11/11/24  Yes Yaya Rush MD   amLODIPine (NORVASC) 10 MG tablet Take 1 tablet by mouth daily 11/11/24  Yes Yaya Rush MD   amoxicillin-clavulanate (AUGMENTIN) 500-125 MG per tablet Take 1 tablet by mouth every 8 hours for 7 days 
  Speech Language Pathology  [facility]  [phone]  SLP Clinical Swallow Evaluation       Patient: Jr Brown   : 1925   MRN: [id number]      Evaluation Date: 2024      Admitting Dx: Hypoxemia [R09.02]  Septicemia (HCC) [A41.9]  Altered mental status, unspecified altered mental status type [R41.82]  Multifocal pneumonia [J18.9]  Acute pneumonia [J18.9]  Treatment Diagnosis: Oropharyngeal Dysphagia   Pain: Denies                                  Recommendations      Recommended Diet and Intervention 2024:  Diet Solids Recommendation:  Regular texture diet  Liquid Consistency Recommendation:  Thin liquids  Recommended form of Meds: Meds whole with water or Meds in puree     Due to current respiratory status pt may benefit from energy conservation with intake and consideration of wearing O2 when eating/drinking      Compensatory strategies: Upright as possible with all PO intake , Small bites/sips , Eat/feed slowly, Remain upright 30-45 min , Aspiration Precautions     Discharge Recommendations:  Discharge recommendations to be determined pending ongoing follow-up during acute care stay    History/Course of Treatment     H&P: The patient is a 89-year-old white American gentleman, moderately obese, came to the emergency room with increasing shortness of breath and dropping O2 saturation with an O2 saturation going up to the 80%.  The patient is actually using intermittent home oxygen.  He also had altered mental status.  Moderate shortness of breath.  Overall, a poor historian.  Some memory loss and poor cognition.  The patient denies any chest pain.  No fevers.  Does have bruising up the left side.     Imaging:  CT Chest:   IMPRESSION:  1. No pulmonary embolism.  2. Multifocal pneumonia with reactive right hilar lymph node enlargement.  3. Cardiomegaly.    Head CT:   IMPRESSION:  1. No acute intracranial abnormality.     Prior Modified Barium Swallow 
Assessment completed, VSS! No complaints of pain, no signs  of distress. Morning medications given, tolerated well! All questions answered, all needs met! Will continue to monitor care  
Assessment completed, VSS! No complaints of pain, no signs of distress. Medications given, tolerated well! All needs met, all questions answered. Will continue to monitor care.   
Department of Internal Medicine  General Internal Medicine   Progress Note      SUBJECTIVE: no apparent distress currently no  active Pain , O2 requirement has stabilized to his Baseline 2 L     History obtained from chart review, the patient, and nursing staff   General ROS: positive for  - fatigue, malaise, and weight gain  negative for - chills, fever, or night sweats  Psychological ROS: negative  Ophthalmic ROS: negative  Respiratory ROS: positive for - cough and shortness of breath  negative for - hemoptysis, pleuritic pain, or wheezing  Cardiovascular ROS: positive for - dyspnea on exertion  negative for - chest pain, loss of consciousness, orthopnea, or palpitations  Gastrointestinal ROS: positive for - abdominal pain  negative for - blood in stools, diarrhea, heartburn, hematemesis, or melena  Genito-Urinary ROS: no dysuria, trouble voiding, or hematuria  Musculoskeletal ROS: negative  Neurological ROS: no TIA or stroke symptoms  Dermatological ROS: negative    OBJECTIVE      Medications      Current Facility-Administered Medications: hydrALAZINE (APRESOLINE) injection 10 mg, 10 mg, IntraVENous, Q6H PRN  amLODIPine (NORVASC) tablet 10 mg, 10 mg, Oral, Daily  dicyclomine (BENTYL) capsule 20 mg, 20 mg, Oral, TID PRN  piperacillin-tazobactam (ZOSYN) 3375 mg in dextrose 50 mL IVPB (premix), 3,375 mg, IntraVENous, Q8H  ipratropium 0.5 mg-albuterol 2.5 mg (DUONEB) nebulizer solution 1 Dose, 1 Dose, Inhalation, Q4H PRN  enoxaparin Sodium (LOVENOX) injection 30 mg, 30 mg, SubCUTAneous, BID  acetaminophen (TYLENOL) tablet 500 mg, 500 mg, Oral, Q4H PRN  multivitamin 1 tablet, 1 tablet, Oral, Daily  Vitamin D (CHOLECALCIFEROL) tablet 2,000 Units, 2,000 Units, Oral, Daily  finasteride (PROSCAR) tablet 5 mg, 5 mg, Oral, Daily  budesonide-formoterol (SYMBICORT) 80-4.5 MCG/ACT inhaler 2 puff, 2 puff, Inhalation, BID RT  gabapentin (NEURONTIN) capsule 600 mg, 600 mg, Oral, Daily  losartan (COZAAR) tablet 100 mg, 100 mg, 
Department of Internal Medicine  General Internal Medicine   Progress Note      SUBJECTIVE: resting comfortable no respiratory distress , his routine care giver still reports intermittent abdomen Pain    History obtained from chart review, the patient, and nursing staff   General ROS: positive for  - fatigue, malaise, and weight gain  negative for - chills, fever, or night sweats  Psychological ROS: negative  Ophthalmic ROS: negative  Respiratory ROS: positive for - cough and shortness of breath  negative for - hemoptysis, pleuritic pain, or wheezing  Cardiovascular ROS: positive for - dyspnea on exertion  negative for - chest pain, loss of consciousness, orthopnea, or palpitations  Gastrointestinal ROS: positive for - abdominal pain  negative for - blood in stools, diarrhea, heartburn, hematemesis, or melena  Genito-Urinary ROS: no dysuria, trouble voiding, or hematuria  Musculoskeletal ROS: negative  Neurological ROS: no TIA or stroke symptoms  Dermatological ROS: negative    OBJECTIVE      Medications      Current Facility-Administered Medications: amLODIPine (NORVASC) tablet 10 mg, 10 mg, Oral, Daily  dicyclomine (BENTYL) capsule 20 mg, 20 mg, Oral, TID PRN  piperacillin-tazobactam (ZOSYN) 3375 mg in dextrose 50 mL IVPB (premix), 3,375 mg, IntraVENous, Q8H  ipratropium 0.5 mg-albuterol 2.5 mg (DUONEB) nebulizer solution 1 Dose, 1 Dose, Inhalation, Q4H PRN  enoxaparin Sodium (LOVENOX) injection 30 mg, 30 mg, SubCUTAneous, BID  acetaminophen (TYLENOL) tablet 500 mg, 500 mg, Oral, Q4H PRN  multivitamin 1 tablet, 1 tablet, Oral, Daily  Vitamin D (CHOLECALCIFEROL) tablet 2,000 Units, 2,000 Units, Oral, Daily  finasteride (PROSCAR) tablet 5 mg, 5 mg, Oral, Daily  budesonide-formoterol (SYMBICORT) 80-4.5 MCG/ACT inhaler 2 puff, 2 puff, Inhalation, BID RT  gabapentin (NEURONTIN) capsule 600 mg, 600 mg, Oral, Daily  losartan (COZAAR) tablet 100 mg, 100 mg, Oral, Daily  methocarbamol (ROBAXIN) tablet 500 mg, 500 mg, Oral, 
Hospital Problems             Last Modified POA    * (Principal) Multifocal pneumonia 11/7/2024 Yes    Primary hypertension 11/7/2024 Yes    Hypoxemia 11/7/2024 Yes    Chronic respiratory failure with hypoxia 11/7/2024 Yes    Dementia (HCC) 11/7/2024 Yes   H&P dictated     
Initial assessment completed- see doc flowsheets. VSS. A&O x2. Confused and repeats same questions. Easily reoriented with constant reminders. Complained of headache- PRN Tylenol and Robaxin given. PRN Hydralazine administered for SBP >170. Rechecked BP and SBP  at 150s. In bed resting. External catheter in place. On 2LPM O2 via NC. No S/S of respiratory distress/SOB. Care ongoing.   
Initial assessment completed- see doc flowsheets. VSS. A&O x2. Confused at times and requires constant reminders. High fall risk- tries to get up from bed/chair. O2 at 2LPM via NC. No complaints of pain/discomfort. No S/S of respiratory distress/SOB. Care ongoing.  
Patient seen in ED, room 14.  Admission completed based off paperwork form [facility], per patient, and personal caregiver who is at bedside.  RN will need to complete the other required items in addition to the 4 Eyes Assessment, Immunizations, Vaccines, Rights and Responsibilities, orientation to room, Plan of Care, Education/Learning Assessment, Education Plan, white board, height and weight, pain assessment and head to toe assessment.  Patient is alert and oriented X 2 to person and place.  Patient is from [facility] and is being admitted for pneumonia.   Home Medication Reconciliation has been/will be completed by the Pharmacy Staff.      Caregiver is at bedside.  Patient uses a walker to ambulate and recently had a fall at [facility].   Patient uses 2 Liters of oxygen as needed especially after a shower, nap, or physical activity.     When asking patient admission questions a lot of responses are No.   Patient caregiver did state that the patient for past 2-3 months patient has been complaining of abdominal pain and bloating after eating.  When asked patient this he denies and says no.  
RN discharge summary from  [place] to a facility.     This patient has had a discharge order placed. They are being discharged to [facility] and being picked up by son and caregiver. Discharge paperwork has been printed and faxed by RONY SMITH completed by this RN. no further needs at this time. IV has been removed with no complications. Telemetry has been removed. Pt has all belongings present.    Report has been called, no answered. Will attempt again    
Shift assessment completed. Routine vitals obtained and stable. Scheduled medications given. Patient is awake, alert and oriented. Respirations are easy and unlabored. Patient does not appear to be in distress, resting comfortably in recliner at this time. Call light within reach.   
Shift assessment completed. Routine vitals obtained and stable; except elevated BP. Scheduled medications given. Patient is currently NPO for Liver Ultrasound later this morning. Patient is awake, alert and oriented. Respirations are easy and unlabored. Patient does not appear to be in distress, resting comfortably in recliner at this time.  Call light within reach.   
Shift assessment completed. Routine vitals obtained. medication given per MAR. Patient is awake, alert and oriented. Respirations are easy and unlabored. Patient does not appear to be in distress, resting comfortably at this time. Bed in lowest position and locked. Call light within reach. Fall precautions are in place.        
Speech Language Pathology  Attempt    Jr Brown  12/22/1925    Attempted to see pt for dysphagia therapy. Per discussion with RN, pt is NPO for liver US. Will attempt to follow-up as schedule allows.     Thank you,     Jorge A Euceda M.A., CCC-SLP   Speech-Language Pathologist  [phone]        
06:15 AM    K 3.5 11/08/2024 06:15 AM    K 3.9 11/06/2024 11:48 AM     11/08/2024 06:15 AM    CO2 26 11/08/2024 06:15 AM    BUN 9 11/08/2024 06:15 AM    CREATININE 0.8 11/08/2024 06:15 AM    GLUCOSE 157 11/08/2024 06:15 AM    CALCIUM 8.9 11/08/2024 06:15 AM    LABGLOM 80 11/08/2024 06:15 AM    LABGLOM >60 01/28/2024 08:48 PM          ASSESSMENT AND PLAN     Hospital Problems             Last Modified POA    * (Principal) Multifocal pneumonia 11/7/2024 Yes    Primary hypertension 11/7/2024 Yes    Hypoxemia 11/7/2024 Yes    Chronic respiratory failure with hypoxia 11/7/2024 Yes    Dementia (HCC) 11/7/2024 Yes   IV Zosyn hillary back on fluids , swallow eval completed , he feels like he is ready to go he has Home O2 at 2 L as per full time care giver , will check abdominal ultrasound for upper quadrant pain ,  discussed at length with caregiver , all questions answered       This Patient's cross coverage is provided by hospitalist services between 7.00 PM to 7.00 am, please contact hospitalist service on call for any emergent issue.     
mobility completed on this date.  Comments:  Balance:  Static Sitting Balance: fair (+): maintains balance at SBA/supervision without use of UE support  Static Standing Balance: fair (-): maintains balance at CGA with use of UE support  Dynamic Standing Balance: fair (-): maintains balance at CGA with use of UE support  Comments:    Other Therapeutic Interventions    Functional Outcomes  -PAC Inpatient Mobility Raw Score : 17              Cognition  Overall Cognitive Status: Impaired  Arousal/Alertness: appropriate responses to stimuli  Following Commands: follows one step commands with repetition, follows one step commands with increased time  Memory: decreased recall of recent events, decreased short term memory  Insights: decreased awareness of deficits  Initiation: requires cues for some  Sequencing: requires cues for some  Orientation:    oriented to person, oriented to place, disoriented to time , and disoriented to situation  Command Following:   accurately follows one step commands with increased time and repetition of instruction    Education  Barriers To Learning: cognition and hearing  Patient Education: patient educated on goals, PT role and benefits, plan of care, general safety, functional mobility training, proper use of assistive device/equipment, discharge recommendations, caregiver education  Learning Assessment:  patient will require reinforcement due to cognitive deficits    Assessment  Activity Tolerance: Pt limited by cognition, otherwise no significant limitations.  Impairments Requiring Therapeutic Intervention: decreased functional mobility, decreased strength, decreased cognition, decreased endurance, decreased balance, increased pain  Prognosis: fair  Clinical Assessment: Pt is a 89 y/o male who presents to the hospital with AMS. Pt is presenting slightly below his baseline level of function and required up to CGA for performance of functional mobility tasks with use of a RW. Pt will 
place, disoriented to time , and disoriented to situation  Command Following:   accurately follows one step commands     Education  Barriers To Learning: cognition and hearing  Patient Education: patient educated on goals, OT role and benefits, plan of care, transfer training, discharge recommendations  Learning Assessment:  patient will require reinforcement due to cognitive deficits    Assessment  Activity Tolerance: tolerated well  Impairments Requiring Therapeutic Intervention: decreased functional mobility, decreased ADL status, decreased strength, decreased safety awareness, decreased cognition, decreased balance  Prognosis: good  Clinical Assessment: Pt is currently functioning slightly below occupational baseline and demo the deficits listed above. Pt is typically MOD I with toileting, grooming, functional mobility/transfers with use of RW and supervision for bathing tasks. Currently pt is requiring CGA for mobility/transfers and standing grooming tasks this date. Pt's caregiver present and states he has 24/7 supervision/assist between caregivers/AL facility. Pt would benefit from continued skilled OT services during hospital stay to return to PLOF, anticipate pt returns to baseline by d/c.   Safety Interventions: patient left in chair, chair alarm in place, call light within reach, and nurse notified    Plan  Frequency: 3-5 x/per week  Current Treatment Recommendations: strengthening, balance training, functional mobility training, transfer training, endurance training, patient/caregiver education, ADL/self-care training, and safety education    Goals  Patient Goals: to return home   Short Term Goals:  Time Frame: by d/c  Patient will complete upper body ADL at modified independent   Patient will complete lower body ADL at modified independent   Patient will complete toileting at modified independent   Patient will complete functional transfers at modified independent   Patient will complete functional

## 2024-11-10 NOTE — CARE COORDINATION
11/10/24 1335   IMM Letter   IMM Letter given to Patient/Family/Significant other/Guardian/POA/by: Given to patient by Isidoro DAHL   IMM Letter date given: 11/10/24   IMM Letter time given: 1345     Electronically signed by Gogo Duran on 11/10/24 at 1:36 PM EST

## 2024-11-10 NOTE — PLAN OF CARE
Problem: ABCDS Injury Assessment  Goal: Absence of physical injury  11/9/2024 2258 by Enrique Barrientos RN  Outcome: Progressing  Flowsheets (Taken 11/9/2024 2100)  Absence of Physical Injury: Implement safety measures based on patient assessment  11/9/2024 1727 by Chantal Can RN  Outcome: Progressing     Problem: Chronic Conditions and Co-morbidities  Goal: Patient's chronic conditions and co-morbidity symptoms are monitored and maintained or improved  11/9/2024 2258 by Enrique Barrientos RN  Outcome: Progressing  Flowsheets (Taken 11/9/2024 2000)  Care Plan - Patient's Chronic Conditions and Co-Morbidity Symptoms are Monitored and Maintained or Improved:   Monitor and assess patient's chronic conditions and comorbid symptoms for stability, deterioration, or improvement   Collaborate with multidisciplinary team to address chronic and comorbid conditions and prevent exacerbation or deterioration   Update acute care plan with appropriate goals if chronic or comorbid symptoms are exacerbated and prevent overall improvement and discharge  11/9/2024 1727 by Chantal Can RN  Outcome: Progressing     Problem: Pain  Goal: Verbalizes/displays adequate comfort level or baseline comfort level  11/9/2024 2258 by Enrique Barrientos RN  Outcome: Progressing  Flowsheets (Taken 11/9/2024 2000)  Verbalizes/displays adequate comfort level or baseline comfort level:   Encourage patient to monitor pain and request assistance   Assess pain using appropriate pain scale   Administer analgesics based on type and severity of pain and evaluate response   Implement non-pharmacological measures as appropriate and evaluate response   Consider cultural and social influences on pain and pain management   Notify Licensed Independent Practitioner if interventions unsuccessful or patient reports new pain  11/9/2024 1727 by Chantal Can RN  Outcome: Progressing  Flowsheets  Taken 11/9/2024 1615  Verbalizes/displays adequate